# Patient Record
Sex: FEMALE | Race: WHITE | Employment: OTHER | URBAN - METROPOLITAN AREA
[De-identification: names, ages, dates, MRNs, and addresses within clinical notes are randomized per-mention and may not be internally consistent; named-entity substitution may affect disease eponyms.]

---

## 2017-06-20 LAB
AVERAGE GLUCOSE: NORMAL
HBA1C MFR BLD: 5.7 %

## 2019-03-19 ENCOUNTER — APPOINTMENT (OUTPATIENT)
Dept: GENERAL RADIOLOGY | Age: 74
End: 2019-03-19
Payer: MEDICARE

## 2019-03-19 ENCOUNTER — HOSPITAL ENCOUNTER (EMERGENCY)
Age: 74
Discharge: HOME | End: 2019-03-19
Attending: EMERGENCY MEDICINE
Payer: MEDICARE

## 2019-03-19 VITALS
DIASTOLIC BLOOD PRESSURE: 82 MMHG | HEART RATE: 79 BPM | RESPIRATION RATE: 18 BRPM | TEMPERATURE: 97.8 F | BODY MASS INDEX: 36.96 KG/M2 | HEIGHT: 66 IN | OXYGEN SATURATION: 96 % | WEIGHT: 230 LBS | SYSTOLIC BLOOD PRESSURE: 151 MMHG

## 2019-03-19 DIAGNOSIS — R10.13 DYSPEPSIA: Primary | ICD-10-CM

## 2019-03-19 DIAGNOSIS — K21.9 GASTROESOPHAGEAL REFLUX DISEASE, ESOPHAGITIS PRESENCE NOT SPECIFIED: ICD-10-CM

## 2019-03-19 LAB
ALBUMIN SERPL-MCNC: 4.1 G/DL (ref 3.5–5.2)
ALP BLD-CCNC: 104 U/L (ref 35–104)
ALT SERPL-CCNC: 12 U/L (ref 0–32)
ANION GAP SERPL CALCULATED.3IONS-SCNC: 11 MMOL/L (ref 7–16)
AST SERPL-CCNC: 17 U/L (ref 0–31)
BASOPHILS ABSOLUTE: 0.04 E9/L (ref 0–0.2)
BASOPHILS RELATIVE PERCENT: 0.5 % (ref 0–2)
BILIRUB SERPL-MCNC: 0.3 MG/DL (ref 0–1.2)
BUN BLDV-MCNC: 23 MG/DL (ref 8–23)
CALCIUM SERPL-MCNC: 9 MG/DL (ref 8.6–10.2)
CHLORIDE BLD-SCNC: 107 MMOL/L (ref 98–107)
CO2: 22 MMOL/L (ref 22–29)
CREAT SERPL-MCNC: 1 MG/DL (ref 0.5–1)
EOSINOPHILS ABSOLUTE: 0.04 E9/L (ref 0.05–0.5)
EOSINOPHILS RELATIVE PERCENT: 0.5 % (ref 0–6)
GFR AFRICAN AMERICAN: >60
GFR NON-AFRICAN AMERICAN: 54 ML/MIN/1.73
GLUCOSE BLD-MCNC: 97 MG/DL (ref 74–99)
HCT VFR BLD CALC: 42.3 % (ref 34–48)
HEMOGLOBIN: 14.1 G/DL (ref 11.5–15.5)
IMMATURE GRANULOCYTES #: 0.02 E9/L
IMMATURE GRANULOCYTES %: 0.3 % (ref 0–5)
LYMPHOCYTES ABSOLUTE: 1.53 E9/L (ref 1.5–4)
LYMPHOCYTES RELATIVE PERCENT: 20.3 % (ref 20–42)
MCH RBC QN AUTO: 32.5 PG (ref 26–35)
MCHC RBC AUTO-ENTMCNC: 33.3 % (ref 32–34.5)
MCV RBC AUTO: 97.5 FL (ref 80–99.9)
MONOCYTES ABSOLUTE: 0.71 E9/L (ref 0.1–0.95)
MONOCYTES RELATIVE PERCENT: 9.4 % (ref 2–12)
NEUTROPHILS ABSOLUTE: 5.21 E9/L (ref 1.8–7.3)
NEUTROPHILS RELATIVE PERCENT: 69 % (ref 43–80)
PDW BLD-RTO: 12.7 FL (ref 11.5–15)
PLATELET # BLD: 273 E9/L (ref 130–450)
PMV BLD AUTO: 11.3 FL (ref 7–12)
POTASSIUM SERPL-SCNC: 4.3 MMOL/L (ref 3.5–5)
PRO-BNP: 1235 PG/ML (ref 0–450)
RBC # BLD: 4.34 E12/L (ref 3.5–5.5)
SODIUM BLD-SCNC: 140 MMOL/L (ref 132–146)
TOTAL PROTEIN: 7.2 G/DL (ref 6.4–8.3)
TROPONIN: <0.01 NG/ML (ref 0–0.03)
WBC # BLD: 7.6 E9/L (ref 4.5–11.5)

## 2019-03-19 PROCEDURE — 84484 ASSAY OF TROPONIN QUANT: CPT

## 2019-03-19 PROCEDURE — 6370000000 HC RX 637 (ALT 250 FOR IP): Performed by: STUDENT IN AN ORGANIZED HEALTH CARE EDUCATION/TRAINING PROGRAM

## 2019-03-19 PROCEDURE — 80053 COMPREHEN METABOLIC PANEL: CPT

## 2019-03-19 PROCEDURE — 71045 X-RAY EXAM CHEST 1 VIEW: CPT

## 2019-03-19 PROCEDURE — 99285 EMERGENCY DEPT VISIT HI MDM: CPT

## 2019-03-19 PROCEDURE — 85025 COMPLETE CBC W/AUTO DIFF WBC: CPT

## 2019-03-19 PROCEDURE — 83880 ASSAY OF NATRIURETIC PEPTIDE: CPT

## 2019-03-19 RX ORDER — PANTOPRAZOLE SODIUM 40 MG/1
40 TABLET, DELAYED RELEASE ORAL
Qty: 30 TABLET | Refills: 3 | Status: SHIPPED | OUTPATIENT
Start: 2019-03-19 | End: 2019-09-12

## 2019-03-19 RX ORDER — SUCRALFATE ORAL 1 G/10ML
1 SUSPENSION ORAL 4 TIMES DAILY
Qty: 1200 ML | Refills: 0 | Status: SHIPPED | OUTPATIENT
Start: 2019-03-19 | End: 2019-09-12

## 2019-03-19 RX ORDER — METOPROLOL SUCCINATE 25 MG/1
25 TABLET, EXTENDED RELEASE ORAL 2 TIMES DAILY
COMMUNITY
End: 2019-06-24 | Stop reason: SDUPTHER

## 2019-03-19 RX ORDER — ASPIRIN 81 MG/1
324 TABLET, CHEWABLE ORAL ONCE
Status: COMPLETED | OUTPATIENT
Start: 2019-03-19 | End: 2019-03-19

## 2019-03-19 RX ADMIN — ASPIRIN 81 MG 324 MG: 81 TABLET ORAL at 15:47

## 2019-03-19 RX ADMIN — LIDOCAINE HYDROCHLORIDE: 20 SOLUTION ORAL; TOPICAL at 15:47

## 2019-03-19 ASSESSMENT — PAIN DESCRIPTION - DESCRIPTORS: DESCRIPTORS: BURNING

## 2019-03-19 ASSESSMENT — ENCOUNTER SYMPTOMS
CHEST TIGHTNESS: 0
BLOOD IN STOOL: 0
COUGH: 0
RHINORRHEA: 0
TROUBLE SWALLOWING: 0
WHEEZING: 0
ABDOMINAL DISTENTION: 0
SINUS PRESSURE: 0
SHORTNESS OF BREATH: 0
EYE PAIN: 0
NAUSEA: 0
SORE THROAT: 0
BACK PAIN: 0
CONSTIPATION: 0
DIARRHEA: 0
EYE REDNESS: 0
PHOTOPHOBIA: 0
ABDOMINAL PAIN: 1
VOMITING: 0

## 2019-03-19 ASSESSMENT — PAIN DESCRIPTION - LOCATION: LOCATION: CHEST

## 2019-03-19 ASSESSMENT — PAIN DESCRIPTION - PAIN TYPE: TYPE: ACUTE PAIN

## 2019-03-19 ASSESSMENT — PAIN DESCRIPTION - ORIENTATION: ORIENTATION: MID

## 2019-03-19 ASSESSMENT — PAIN SCALES - GENERAL: PAINLEVEL_OUTOF10: 4

## 2019-03-29 LAB
EKG ATRIAL RATE: 416 BPM
EKG Q-T INTERVAL: 426 MS
EKG QRS DURATION: 84 MS
EKG QTC CALCULATION (BAZETT): 396 MS
EKG R AXIS: 25 DEGREES
EKG T AXIS: 15 DEGREES
EKG VENTRICULAR RATE: 52 BPM

## 2019-06-24 DIAGNOSIS — M25.569 ARTHRALGIA OF KNEE, UNSPECIFIED LATERALITY: Primary | ICD-10-CM

## 2019-06-24 RX ORDER — ACETAMINOPHEN AND CODEINE PHOSPHATE 300; 30 MG/1; MG/1
TABLET ORAL
COMMUNITY
Start: 2019-05-31 | End: 2019-06-24 | Stop reason: SDUPTHER

## 2019-06-25 RX ORDER — DILTIAZEM HYDROCHLORIDE 360 MG/1
360 CAPSULE, EXTENDED RELEASE ORAL DAILY
Qty: 90 CAPSULE | Refills: 1 | Status: SHIPPED | OUTPATIENT
Start: 2019-06-25 | End: 2019-11-04 | Stop reason: SDUPTHER

## 2019-06-25 RX ORDER — METOPROLOL SUCCINATE 25 MG/1
25 TABLET, EXTENDED RELEASE ORAL 2 TIMES DAILY
Qty: 90 TABLET | Refills: 1 | Status: ON HOLD | OUTPATIENT
Start: 2019-06-25 | End: 2020-01-15

## 2019-06-25 RX ORDER — ACETAMINOPHEN AND CODEINE PHOSPHATE 300; 30 MG/1; MG/1
1 TABLET ORAL EVERY 6 HOURS PRN
Qty: 120 TABLET | Refills: 3 | Status: SHIPPED
Start: 2019-06-25 | End: 2019-07-25

## 2019-09-12 ENCOUNTER — OFFICE VISIT (OUTPATIENT)
Dept: PRIMARY CARE CLINIC | Age: 74
End: 2019-09-12
Payer: MEDICARE

## 2019-09-12 VITALS
SYSTOLIC BLOOD PRESSURE: 142 MMHG | BODY MASS INDEX: 36.48 KG/M2 | HEART RATE: 58 BPM | TEMPERATURE: 98.3 F | DIASTOLIC BLOOD PRESSURE: 72 MMHG | HEIGHT: 66 IN | WEIGHT: 227 LBS | OXYGEN SATURATION: 97 %

## 2019-09-12 DIAGNOSIS — R73.01 IMPAIRED FASTING BLOOD SUGAR: ICD-10-CM

## 2019-09-12 DIAGNOSIS — I48.0 PAROXYSMAL ATRIAL FIBRILLATION (HCC): Primary | ICD-10-CM

## 2019-09-12 DIAGNOSIS — Z13.6 SCREENING FOR CARDIOVASCULAR CONDITION: ICD-10-CM

## 2019-09-12 PROCEDURE — 99213 OFFICE O/P EST LOW 20 MIN: CPT | Performed by: FAMILY MEDICINE

## 2019-09-12 ASSESSMENT — PATIENT HEALTH QUESTIONNAIRE - PHQ9
SUM OF ALL RESPONSES TO PHQ9 QUESTIONS 1 & 2: 0
SUM OF ALL RESPONSES TO PHQ QUESTIONS 1-9: 0
SUM OF ALL RESPONSES TO PHQ QUESTIONS 1-9: 0
2. FEELING DOWN, DEPRESSED OR HOPELESS: 0
1. LITTLE INTEREST OR PLEASURE IN DOING THINGS: 0

## 2019-09-12 ASSESSMENT — ENCOUNTER SYMPTOMS
COUGH: 0
DIARRHEA: 0
NAUSEA: 0
CONSTIPATION: 0
BACK PAIN: 0
VOMITING: 0
WHEEZING: 0
SHORTNESS OF BREATH: 0
ABDOMINAL PAIN: 0

## 2019-09-12 NOTE — PROGRESS NOTES
19  Paul Crowe : 1945 Sex: female  Age: 74 y.o. Chief Complaint   Patient presents with   Beatrice Nunez       HPI:  74 y.o. female presents today for 5 month(s) follow up of Chronic medical conditions and to establish with new provider. Patient's chart, medical, surgical and medication history all reviewed. Atrial Fibrillation  Patient has atrial fibrillation. Previously seen by Dr. Oumou Chan. Rate controlled on Metoprolol and Diltiazem. Current rhythm: irregular. Known history of atrial fibrillation: yes  Valvular disease: No  Associated symptoms: none  Previous cardioversion and/or ablation: no  History of CAD: No  History of sleep apnea: No  History of ETOH abuse/drug abuse: No  History of thyroid disease: No    No palpitations, dizziness, shortness of breath, chest pain, racing heart, bruising or bleeding. ROS:  Review of Systems   Constitutional: Negative for chills, fatigue and fever. Respiratory: Negative for cough, shortness of breath and wheezing. Cardiovascular: Negative for chest pain and palpitations. Gastrointestinal: Negative for abdominal pain, constipation, diarrhea, nausea and vomiting. Musculoskeletal: Negative for arthralgias and back pain. Skin: Negative for rash. Neurological: Negative for dizziness and headaches. Psychiatric/Behavioral: Negative for dysphoric mood. The patient is not nervous/anxious.          Current Outpatient Medications:     Acetaminophen-Codeine (TYLENOL WITH CODEINE #3 PO), Take by mouth as needed, Disp: , Rfl:     apixaban (ELIQUIS) 5 MG TABS tablet, Take 1 tablet by mouth 2 times daily, Disp: 180 tablet, Rfl: 3    metoprolol succinate (TOPROL XL) 25 MG extended release tablet, Take 1 tablet by mouth 2 times daily, Disp: 90 tablet, Rfl: 1    diltiazem (CARDIZEM CD) 360 MG extended release capsule, Take 1 capsule by mouth daily, Disp: 90 capsule, Rfl: 1    Allergies   Allergen Reactions    Penicillins Hives Past Medical History:   Diagnosis Date    History of DVT (deep vein thrombosis)     Hyperlipidemia     Hypertension     Obesity      Past Surgical History:   Procedure Laterality Date    APPENDECTOMY      CHOLECYSTECTOMY      COLONOSCOPY  10/07/2016    JOINT REPLACEMENT Right 2012    TKA     Family History   Problem Relation Age of Onset    Pancreatic Cancer Brother     Diabetes type 2  Brother     Diabetes type 2  Mother     Other Father         AAA    Diabetes type 2  Sister     Pancreatic Cancer Brother      Social History     Socioeconomic History    Marital status: Single     Spouse name: Not on file    Number of children: Not on file    Years of education: Not on file    Highest education level: Not on file   Occupational History    Not on file   Social Needs    Financial resource strain: Not on file    Food insecurity:     Worry: Not on file     Inability: Not on file    Transportation needs:     Medical: Not on file     Non-medical: Not on file   Tobacco Use    Smoking status: Never Smoker    Smokeless tobacco: Never Used   Substance and Sexual Activity    Alcohol use: No    Drug use: No    Sexual activity: Not on file   Lifestyle    Physical activity:     Days per week: Not on file     Minutes per session: Not on file    Stress: Not on file   Relationships    Social connections:     Talks on phone: Not on file     Gets together: Not on file     Attends Congregation service: Not on file     Active member of club or organization: Not on file     Attends meetings of clubs or organizations: Not on file     Relationship status: Not on file    Intimate partner violence:     Fear of current or ex partner: Not on file     Emotionally abused: Not on file     Physically abused: Not on file     Forced sexual activity: Not on file   Other Topics Concern    Not on file   Social History Narrative    Not on file       Vitals:    09/12/19 1043   BP: (!) 142/72   Pulse: 58   Temp: 98.3 °F

## 2019-11-04 DIAGNOSIS — I48.0 PAROXYSMAL ATRIAL FIBRILLATION (HCC): ICD-10-CM

## 2019-11-04 RX ORDER — DILTIAZEM HYDROCHLORIDE 360 MG/1
360 CAPSULE, EXTENDED RELEASE ORAL DAILY
Qty: 90 CAPSULE | Refills: 3 | Status: SHIPPED
Start: 2019-11-04 | End: 2020-02-24

## 2019-11-04 RX ORDER — METOPROLOL SUCCINATE 25 MG/1
25 TABLET, EXTENDED RELEASE ORAL 2 TIMES DAILY
Qty: 30 TABLET | Refills: 5 | Status: CANCELLED | OUTPATIENT
Start: 2019-11-04

## 2020-01-15 ENCOUNTER — APPOINTMENT (OUTPATIENT)
Dept: GENERAL RADIOLOGY | Age: 75
End: 2020-01-15
Payer: MEDICARE

## 2020-01-15 ENCOUNTER — OFFICE VISIT (OUTPATIENT)
Dept: FAMILY MEDICINE CLINIC | Age: 75
End: 2020-01-15
Payer: MEDICARE

## 2020-01-15 ENCOUNTER — HOSPITAL ENCOUNTER (OUTPATIENT)
Age: 75
Setting detail: OBSERVATION
Discharge: HOME | End: 2020-01-16
Attending: EMERGENCY MEDICINE | Admitting: INTERNAL MEDICINE
Payer: MEDICARE

## 2020-01-15 VITALS
TEMPERATURE: 99.2 F | RESPIRATION RATE: 16 BRPM | HEIGHT: 65 IN | SYSTOLIC BLOOD PRESSURE: 144 MMHG | DIASTOLIC BLOOD PRESSURE: 80 MMHG | BODY MASS INDEX: 37.99 KG/M2 | HEART RATE: 68 BPM | OXYGEN SATURATION: 97 % | WEIGHT: 228 LBS

## 2020-01-15 PROBLEM — R07.89 OTHER CHEST PAIN: Status: ACTIVE | Noted: 2020-01-15

## 2020-01-15 LAB
ALBUMIN SERPL-MCNC: 4.1 G/DL (ref 3.5–5.2)
ALP BLD-CCNC: 112 U/L (ref 35–104)
ALT SERPL-CCNC: 11 U/L (ref 0–32)
ANION GAP SERPL CALCULATED.3IONS-SCNC: 12 MMOL/L (ref 7–16)
AST SERPL-CCNC: 14 U/L (ref 0–31)
BASOPHILS ABSOLUTE: 0.05 E9/L (ref 0–0.2)
BASOPHILS RELATIVE PERCENT: 0.7 % (ref 0–2)
BILIRUB SERPL-MCNC: 0.2 MG/DL (ref 0–1.2)
BUN BLDV-MCNC: 23 MG/DL (ref 8–23)
CALCIUM SERPL-MCNC: 9.8 MG/DL (ref 8.6–10.2)
CHLORIDE BLD-SCNC: 103 MMOL/L (ref 98–107)
CO2: 25 MMOL/L (ref 22–29)
CREAT SERPL-MCNC: 1.2 MG/DL (ref 0.5–1)
EKG ATRIAL RATE: 104 BPM
EKG Q-T INTERVAL: 396 MS
EKG QRS DURATION: 86 MS
EKG QTC CALCULATION (BAZETT): 408 MS
EKG R AXIS: 41 DEGREES
EKG T AXIS: 19 DEGREES
EKG VENTRICULAR RATE: 64 BPM
EOSINOPHILS ABSOLUTE: 0.02 E9/L (ref 0.05–0.5)
EOSINOPHILS RELATIVE PERCENT: 0.3 % (ref 0–6)
GFR AFRICAN AMERICAN: 53
GFR NON-AFRICAN AMERICAN: 44 ML/MIN/1.73
GLUCOSE BLD-MCNC: 120 MG/DL (ref 74–99)
HCT VFR BLD CALC: 43.3 % (ref 34–48)
HEMOGLOBIN: 14 G/DL (ref 11.5–15.5)
IMMATURE GRANULOCYTES #: 0.02 E9/L
IMMATURE GRANULOCYTES %: 0.3 % (ref 0–5)
LYMPHOCYTES ABSOLUTE: 1.6 E9/L (ref 1.5–4)
LYMPHOCYTES RELATIVE PERCENT: 23 % (ref 20–42)
MCH RBC QN AUTO: 31.8 PG (ref 26–35)
MCHC RBC AUTO-ENTMCNC: 32.3 % (ref 32–34.5)
MCV RBC AUTO: 98.4 FL (ref 80–99.9)
MONOCYTES ABSOLUTE: 0.6 E9/L (ref 0.1–0.95)
MONOCYTES RELATIVE PERCENT: 8.6 % (ref 2–12)
NEUTROPHILS ABSOLUTE: 4.67 E9/L (ref 1.8–7.3)
NEUTROPHILS RELATIVE PERCENT: 67.1 % (ref 43–80)
PDW BLD-RTO: 12.6 FL (ref 11.5–15)
PLATELET # BLD: 268 E9/L (ref 130–450)
PMV BLD AUTO: 10.9 FL (ref 7–12)
POTASSIUM REFLEX MAGNESIUM: 5 MMOL/L (ref 3.5–5)
RBC # BLD: 4.4 E12/L (ref 3.5–5.5)
SODIUM BLD-SCNC: 140 MMOL/L (ref 132–146)
TOTAL PROTEIN: 7.3 G/DL (ref 6.4–8.3)
TROPONIN: <0.01 NG/ML (ref 0–0.03)
TROPONIN: <0.01 NG/ML (ref 0–0.03)
WBC # BLD: 7 E9/L (ref 4.5–11.5)

## 2020-01-15 PROCEDURE — 93005 ELECTROCARDIOGRAM TRACING: CPT | Performed by: NURSE PRACTITIONER

## 2020-01-15 PROCEDURE — 71045 X-RAY EXAM CHEST 1 VIEW: CPT

## 2020-01-15 PROCEDURE — 99213 OFFICE O/P EST LOW 20 MIN: CPT | Performed by: FAMILY MEDICINE

## 2020-01-15 PROCEDURE — G0378 HOSPITAL OBSERVATION PER HR: HCPCS

## 2020-01-15 PROCEDURE — 84484 ASSAY OF TROPONIN QUANT: CPT

## 2020-01-15 PROCEDURE — 85025 COMPLETE CBC W/AUTO DIFF WBC: CPT

## 2020-01-15 PROCEDURE — 6370000000 HC RX 637 (ALT 250 FOR IP): Performed by: INTERNAL MEDICINE

## 2020-01-15 PROCEDURE — 80053 COMPREHEN METABOLIC PANEL: CPT

## 2020-01-15 PROCEDURE — 99284 EMERGENCY DEPT VISIT MOD MDM: CPT

## 2020-01-15 PROCEDURE — 36415 COLL VENOUS BLD VENIPUNCTURE: CPT

## 2020-01-15 RX ORDER — CALCIUM CARBONATE 200(500)MG
2 TABLET,CHEWABLE ORAL 2 TIMES DAILY PRN
COMMUNITY

## 2020-01-15 RX ORDER — DILTIAZEM HYDROCHLORIDE 180 MG/1
360 CAPSULE, COATED, EXTENDED RELEASE ORAL DAILY
Status: DISCONTINUED | OUTPATIENT
Start: 2020-01-16 | End: 2020-01-16 | Stop reason: HOSPADM

## 2020-01-15 RX ORDER — ATORVASTATIN CALCIUM 40 MG/1
40 TABLET, FILM COATED ORAL NIGHTLY
Status: DISCONTINUED | OUTPATIENT
Start: 2020-01-15 | End: 2020-01-16 | Stop reason: HOSPADM

## 2020-01-15 RX ORDER — ONDANSETRON 2 MG/ML
4 INJECTION INTRAMUSCULAR; INTRAVENOUS EVERY 6 HOURS PRN
Status: DISCONTINUED | OUTPATIENT
Start: 2020-01-15 | End: 2020-01-16 | Stop reason: HOSPADM

## 2020-01-15 RX ORDER — DILTIAZEM HYDROCHLORIDE 360 MG/1
CAPSULE, EXTENDED RELEASE ORAL
Status: ON HOLD | COMMUNITY
Start: 2019-12-28 | End: 2020-01-15

## 2020-01-15 RX ADMIN — METOPROLOL TARTRATE 25 MG: 25 TABLET ORAL at 21:57

## 2020-01-15 RX ADMIN — APIXABAN 5 MG: 5 TABLET, FILM COATED ORAL at 21:57

## 2020-01-15 RX ADMIN — ATORVASTATIN CALCIUM 40 MG: 40 TABLET, FILM COATED ORAL at 21:57

## 2020-01-15 ASSESSMENT — PAIN - FUNCTIONAL ASSESSMENT: PAIN_FUNCTIONAL_ASSESSMENT: ACTIVITIES ARE NOT PREVENTED

## 2020-01-15 ASSESSMENT — PAIN DESCRIPTION - ORIENTATION
ORIENTATION: MID
ORIENTATION: RIGHT;LEFT

## 2020-01-15 ASSESSMENT — PAIN DESCRIPTION - ONSET
ONSET: GRADUAL
ONSET: ON-GOING

## 2020-01-15 ASSESSMENT — PAIN DESCRIPTION - FREQUENCY
FREQUENCY: CONTINUOUS
FREQUENCY: CONTINUOUS

## 2020-01-15 ASSESSMENT — PAIN DESCRIPTION - LOCATION
LOCATION: KNEE
LOCATION: CHEST

## 2020-01-15 ASSESSMENT — PAIN DESCRIPTION - PAIN TYPE
TYPE: ACUTE PAIN
TYPE: CHRONIC PAIN

## 2020-01-15 ASSESSMENT — PAIN SCALES - GENERAL
PAINLEVEL_OUTOF10: 5
PAINLEVEL_OUTOF10: 5

## 2020-01-15 ASSESSMENT — PAIN DESCRIPTION - PROGRESSION
CLINICAL_PROGRESSION: NOT CHANGED
CLINICAL_PROGRESSION: GRADUALLY WORSENING

## 2020-01-15 ASSESSMENT — PAIN DESCRIPTION - DESCRIPTORS
DESCRIPTORS: BURNING
DESCRIPTORS: DISCOMFORT;SHARP

## 2020-01-15 NOTE — PROGRESS NOTES
1/15/20  Av Vzaquez : 1945 Sex: female  Age: 66 y.o. Chief Complaint   Patient presents with    Heartburn       HPI: The patient states that they have had epigastric/central chest discomfort. Started about 4 am this morning. The pain is described as heaviness/chest discomfort and is located in the epigastric and central chest region. The patient denies nausea and vomiting. Better with food. But comes back. Tums did help but it did come back shortly after taking. Bowel movements have been normal color and consistency. No diarrhea. No black or bloody stools. The patient denies dysuria, urinary frequency, urgency and or gross hematuria. No flank pain. No fevers or chills. Some SOB. They come to the urgent care for evaluation. ROS:  Const: Positives and pertinent negatives as per HPI. All others reviewed and are negative.          Current Outpatient Medications:     diltiazem (TIAZAC) 360 MG extended release capsule, , Disp: , Rfl:     diltiazem (CARDIZEM CD) 360 MG extended release capsule, Take 1 capsule by mouth daily, Disp: 90 capsule, Rfl: 3    apixaban (ELIQUIS) 5 MG TABS tablet, Take 1 tablet by mouth 2 times daily, Disp: 180 tablet, Rfl: 3    metoprolol tartrate (LOPRESSOR) 25 MG tablet, Take 1 tablet by mouth 2 times daily, Disp: 180 tablet, Rfl: 3    Acetaminophen-Codeine (TYLENOL WITH CODEINE #3 PO), Take by mouth as needed, Disp: , Rfl:     metoprolol succinate (TOPROL XL) 25 MG extended release tablet, Take 1 tablet by mouth 2 times daily, Disp: 90 tablet, Rfl: 1  Allergies   Allergen Reactions    Aspirin     Penicillins Hives       Past Medical History:   Diagnosis Date    A-fib ([facility] [address]     paroxysmal    Acute renal failure syndrome (HCC) 10/1/16  10/07/2016    2ry severe dehydration     Chronic pain syndrome     Colon polyps     Microscopic colitis    Degenerative joint disease     Involving multiple joints    Diarrhea     2ry C-Diff 10/1/2016    GERD (gastroesophageal reflux disease)     Hemorrhoids     History of DVT (deep vein thrombosis)     Hx of acute gastritis     Hx of diarrhea     Hyperlipidemia     Hypertension     Hypo-osmolality and hyponatremia     Hypotension     Insomnia     Obesity     Paroxysmal atrial fibrillation (HCC)     Peptic reflux disease     Pulmonary embolism (HCC)     Syncope and collapse      Past Surgical History:   Procedure Laterality Date    APPENDECTOMY      CHOLECYSTECTOMY      COLONOSCOPY  10/07/2016    JOINT REPLACEMENT Right 2012    TKA     Family History   Problem Relation Age of Onset    Pancreatic Cancer Brother     Diabetes type 2  Brother     Diabetes type 2  Mother     Other Father         AAA    Diabetes type 2  Sister     Pancreatic Cancer Brother      Social History     Socioeconomic History    Marital status: Single     Spouse name: Not on file    Number of children: Not on file    Years of education: Not on file    Highest education level: Not on file   Occupational History    Not on file   Social Needs    Financial resource strain: Not on file    Food insecurity:     Worry: Not on file     Inability: Not on file    Transportation needs:     Medical: Not on file     Non-medical: Not on file   Tobacco Use    Smoking status: Never Smoker    Smokeless tobacco: Never Used   Substance and Sexual Activity    Alcohol use: No    Drug use: No    Sexual activity: Not on file   Lifestyle    Physical activity:     Days per week: Not on file     Minutes per session: Not on file    Stress: Not on file   Relationships    Social connections:     Talks on phone: Not on file     Gets together: Not on file     Attends Sikhism service: Not on file     Active member of club or organization: Not on file     Attends meetings of clubs or organizations: Not on file     Relationship status: Not on file    Intimate partner violence:     Fear of current or ex partner: Not on file     Emotionally abused: Not on file     Physically abused: Not on file     Forced sexual activity: Not on file   Other Topics Concern    Not on file   Social History Narrative    Not on file       Vitals:    01/15/20 1418 01/15/20 1422   BP: (!) 144/80 (!) 144/80   Pulse: 68    Resp: 16    Temp: 99.2 °F (37.3 °C)    SpO2: 97%    Weight: 228 lb (103.4 kg)    Height: 5' 5\" (1.651 m)        Exam:  Physical Exam  Constitutional:       Appearance: She is well-developed. HENT:      Head: Normocephalic. Eyes:      Conjunctiva/sclera: Conjunctivae normal.      Pupils: Pupils are equal, round, and reactive to light. Cardiovascular:      Rate and Rhythm: Normal rate. Rhythm irregularly irregular. Heart sounds: Normal heart sounds. No murmur. Pulmonary:      Effort: Pulmonary effort is normal.      Breath sounds: Normal breath sounds. No wheezing or rales. Abdominal:      General: Bowel sounds are normal.      Palpations: Abdomen is soft. Tenderness: There is no tenderness. Neurological:      Mental Status: She is alert. Comments: Cranial nerves grossly intact         Assessment and Plan:  Marcelo Knott was seen today for heartburn. Diagnoses and all orders for this visit:    Other chest pain    To ER. Patient need EKG, Trops, and chest x-ray. Consider outpatient stress or coronary CT for calcium scoring at [facility] if above work-up is negative. Return in about 1 week (around 1/22/2020), or if symptoms worsen or fail to improve. The above treatment regimen was discussed with the patient at length and all questions in regards to such were answered. Patient was advised to proceed to the emergency department with any worsening symptoms including significant dehydration. Patient should follow up with her family doctor within 3-5 days.     Seen By:  Sunni Duvall MD

## 2020-01-15 NOTE — ED PROVIDER NOTES
ED Attending  CC: Sarah         Department of Emergency Medicine   ED  Provider Note  Admit Date/RoomTime: 1/15/2020  4:15 PM  ED Room: 0627/0627-A  MRN: [id number]  Chief Complaint:   Gastroesophageal Reflux (onset 0400 today )       History of Present Illness   Source of history provided by:  patient. History/Exam Limitations: none. Tre Butler is a 66 y.o. female who has a past medical history of:   Past Medical History:   Diagnosis Date    Acute renal failure syndrome ([address] 10/1/16  10/07/2016    2ry severe dehydration     Chronic pain syndrome     Colon polyps     Microscopic colitis    Degenerative joint disease     Involving multiple joints    Diarrhea     2ry C-Diff 10/1/2016    GERD (gastroesophageal reflux disease)     Hemorrhoids     History of DVT (deep vein thrombosis)     History of pulmonary embolism     following knee surgery    Hx of acute gastritis     Hx of diarrhea     Hyperlipidemia     Hypertension     Hypo-osmolality and hyponatremia     Insomnia     denies as of 1/15/20    Obesity     Paroxysmal atrial fibrillation (HCC)     Syncope and collapse     presents to the ED by private vehicle for centralized chest pain beginning a few hours prior to arrival./Trouble lies chest pain starting a few hours before arrival.  Patient states her symptoms have completely resolved. Patient states she had a similar episode last year which was treated as GERD. Patient states she was sitting when it started. Patient states she tried Tums and drinking milk with no relief. Patient has a  H/o a fib, PE. Denies fever/chills, headache, dizziness, vision change, cough, hemoptysis, dyspnea, abdominal pain, NVD, urinary symptoms, numbness/weakness. ROS   Pertinent positives and negatives are stated within HPI, all other systems reviewed and are negative.        Past Surgical History:   Procedure Laterality Date    APPENDECTOMY      CHOLECYSTECTOMY      COLONOSCOPY  10/07/2016    JOINT REPLACEMENT Right 2012    TKA   Social History:  reports that she has never smoked. She has never used smokeless tobacco. She reports that she does not drink alcohol or use drugs. Family History: family history includes Diabetes type 2  in her brother, mother, and sister; Other in her father; Pancreatic Cancer in her brother and brother. Allergies: Aspirin and Penicillins    Physical Exam Section   Oxygen Saturation Interpretation: Normal.   ED Triage Vitals   BP Temp Temp src Pulse Resp SpO2 Height Weight   01/15/20 1506 01/15/20 1506 -- 01/15/20 1506 01/15/20 1506 01/15/20 1506 01/15/20 1441 01/15/20 1441   138/73 98.6 °F (37 °C)  68 16 94 % 5' 6\" (1.676 m) 248 lb (112.5 kg)       Physical Exam  · Constitutional/General: Alert and oriented x3, well appearing, non toxic. · HEENT:  NC/NT. PERRLA,  Airway patent. · Neck: Supple, full ROM, non tender to palpation in the midline, no stridor, no crepitus, no meningeal signs  · Respiratory: Lungs clear to auscultation bilaterally, no wheezes, rales, or rhonchi. Not in respiratory distress  · CV:  Regular rate. Regular rhythm. No murmurs, gallops, or rubs. 2+ distal pulses  · Chest: No chest wall tenderness  · GI:  Abdomen Soft, Non tender, Non distended. +BS. No rebound, guarding, or rigidity. No pulsatile masses. · Musculoskeletal: Moves all extremities x 4. Warm and well perfused, no clubbing, cyanosis, or edema. Capillary refill <3 seconds  · Integument: skin warm and dry. No rashes.    · Lymphatic: no lymphadenopathy noted  · Neurologic: GCS 15, no focal deficits, symmetric strength 5/5 in the upper and lower extremities bilaterally  · Psychiatric: Normal Affect      Lab / Imaging Results   (All laboratory and radiology results have been personally reviewed by myself)  Labs:  Results for orders placed or performed during the hospital encounter of 01/15/20   CBC Auto Differential   Result Value Ref Range    WBC 7.0 4.5 - 11.5 E9/L    RBC 4.40 3.50 - 5.50 E12/L Hemoglobin 14.0 11.5 - 15.5 g/dL    Hematocrit 43.3 34.0 - 48.0 %    MCV 98.4 80.0 - 99.9 fL    MCH 31.8 26.0 - 35.0 pg    MCHC 32.3 32.0 - 34.5 %    RDW 12.6 11.5 - 15.0 fL    Platelets 934 741 - 111 E9/L    MPV 10.9 7.0 - 12.0 fL    Neutrophils % 67.1 43.0 - 80.0 %    Immature Granulocytes % 0.3 0.0 - 5.0 %    Lymphocytes % 23.0 20.0 - 42.0 %    Monocytes % 8.6 2.0 - 12.0 %    Eosinophils % 0.3 0.0 - 6.0 %    Basophils % 0.7 0.0 - 2.0 %    Neutrophils Absolute 4.67 1.80 - 7.30 E9/L    Immature Granulocytes # 0.02 E9/L    Lymphocytes Absolute 1.60 1.50 - 4.00 E9/L    Monocytes Absolute 0.60 0.10 - 0.95 E9/L    Eosinophils Absolute 0.02 (L) 0.05 - 0.50 E9/L    Basophils Absolute 0.05 0.00 - 0.20 E9/L   Comprehensive Metabolic Panel w/ Reflex to MG   Result Value Ref Range    Sodium 140 132 - 146 mmol/L    Potassium reflex Magnesium 5.0 3.5 - 5.0 mmol/L    Chloride 103 98 - 107 mmol/L    CO2 25 22 - 29 mmol/L    Anion Gap 12 7 - 16 mmol/L    Glucose 120 (H) 74 - 99 mg/dL    BUN 23 8 - 23 mg/dL    CREATININE 1.2 (H) 0.5 - 1.0 mg/dL    GFR Non-African American 44 >=60 mL/min/1.73    GFR African American 53     Calcium 9.8 8.6 - 10.2 mg/dL    Total Protein 7.3 6.4 - 8.3 g/dL    Alb 4.1 3.5 - 5.2 g/dL    Total Bilirubin 0.2 0.0 - 1.2 mg/dL    Alkaline Phosphatase 112 (H) 35 - 104 U/L    ALT 11 0 - 32 U/L    AST 14 0 - 31 U/L   Troponin   Result Value Ref Range    Troponin <0.01 0.00 - 0.03 ng/mL   Comprehensive Metabolic Panel w/ Reflex to MG   Result Value Ref Range    Sodium 138 132 - 146 mmol/L    Potassium reflex Magnesium 4.0 3.5 - 5.0 mmol/L    Chloride 104 98 - 107 mmol/L    CO2 22 22 - 29 mmol/L    Anion Gap 12 7 - 16 mmol/L    Glucose 104 (H) 74 - 99 mg/dL    BUN 17 8 - 23 mg/dL    CREATININE 1.1 (H) 0.5 - 1.0 mg/dL    GFR Non-African American 48 >=60 mL/min/1.73    GFR African American 58     Calcium 9.1 8.6 - 10.2 mg/dL    Total Protein 6.9 6.4 - 8.3 g/dL    Alb 3.8 3.5 - 5.2 g/dL    Total Bilirubin 0.4 0.0 - 1.2 mg/dL    Alkaline Phosphatase 105 (H) 35 - 104 U/L    ALT 11 0 - 32 U/L    AST 13 0 - 31 U/L   Troponin   Result Value Ref Range    Troponin <0.01 0.00 - 0.03 ng/mL   Troponin   Result Value Ref Range    Troponin <0.01 0.00 - 0.03 ng/mL   Lipid panel - fasting   Result Value Ref Range    Cholesterol, Total 194 0 - 199 mg/dL    Triglycerides 122 0 - 149 mg/dL    HDL 40 >40 mg/dL    LDL Calculated 130 (H) 0 - 99 mg/dL    VLDL Cholesterol Calculated 24 mg/dL   CBC   Result Value Ref Range    WBC 6.5 4.5 - 11.5 E9/L    RBC 4.33 3.50 - 5.50 E12/L    Hemoglobin 14.0 11.5 - 15.5 g/dL    Hematocrit 42.6 34.0 - 48.0 %    MCV 98.4 80.0 - 99.9 fL    MCH 32.3 26.0 - 35.0 pg    MCHC 32.9 32.0 - 34.5 %    RDW 12.6 11.5 - 15.0 fL    Platelets 063 253 - 481 E9/L    MPV 11.1 7.0 - 12.0 fL   EKG 12 Lead   Result Value Ref Range    Ventricular Rate 64 BPM    Atrial Rate 104 BPM    QRS Duration 86 ms    Q-T Interval 396 ms    QTc Calculation (Bazett) 408 ms    R Axis 41 degrees    T Axis 19 degrees     Imaging: All Radiology results interpreted by Radiologist unless otherwise noted. XR CHEST PORTABLE   Final Result      No evidence for acute cardiopulmonary process. NM Cardiac Stress Test Nuclear Imaging    (Results Pending)     EKG #1:  Interpreted by emergency department physician unless otherwise noted.   Time:  1520    Rate: 64bpm; QRS 86  Rhythm: Atrial fibrillation  Interpretation: atrial fibrillation; no st segment elevation or depression      ED Course / Medical Decision Making     Medications   apixaban (ELIQUIS) tablet 5 mg (5 mg Oral Given 1/16/20 0850)   diltiazem (CARDIZEM CD) extended release capsule 360 mg (360 mg Oral Given 1/16/20 0850)   metoprolol tartrate (LOPRESSOR) tablet 25 mg (25 mg Oral Given 1/16/20 0850)   magnesium hydroxide (MILK OF MAGNESIA) 400 MG/5ML suspension 30 mL (has no administration in time range)   ondansetron (ZOFRAN) injection 4 mg (has no administration in time range) atorvastatin (LIPITOR) tablet 40 mg (40 mg Oral Given 1/15/20 7451)   regadenoson (LEXISCAN) injection 0.4 mg (has no administration in time range)   pantoprazole (PROTONIX) tablet 40 mg (has no administration in time range)          Consultations:             IP CONSULT TO INTERNAL MEDICINE  IP CONSULT TO CARDIOLOGY    Procedures:   none    MDM: Patient presenting with centralized chest pain starting at 0400 and resolving before arrival.  Patient states she attributed it to GERD but states it lasted longer than normal.  Patient states she had a similar episode last year around this time which was treated as GERD. Patient is in no acute distress and nontoxic in appearance. Patient has an aspirin allergy so it was not given. Patient has a heart score of 5. Patient's EKG shows that she is in a fib which she has a h/o. Patient's labs and imaging are stable patient to be admitted for cardiac work-up due to her history and symptoms. Counseling:   I have spoken with the patient and discussed todays results, in addition to providing specific details for the plan of care and counseling regarding the diagnosis and prognosis and are agreeable with the plan. Assessment      1. Chest pain, unspecified type      This patient's ED course included: a personal history and physicial examination, multiple bedside re-evaluations and cardiac monitoring  This patient has remained hemodynamically stable during their ED course. Plan   Admit to telemetry. Patient condition is stable. New Medications     Current Discharge Medication List      START taking these medications    Details   pantoprazole (PROTONIX) 40 MG tablet Take 1 tablet by mouth every morning (before breakfast)  Qty: 30 tablet, Refills: 0           Electronically signed by Darvin Mejia PA-C   DD: 1/15/20  **This report was transcribed using voice recognition software.  Every effort was made to ensure accuracy; however, inadvertent computerized transcription errors may be present.   END OF PROVIDER NOTE     Barry Underwood PA-C  01/16/20 1776

## 2020-01-16 ENCOUNTER — APPOINTMENT (OUTPATIENT)
Dept: NON INVASIVE DIAGNOSTICS | Age: 75
End: 2020-01-16
Payer: MEDICARE

## 2020-01-16 ENCOUNTER — APPOINTMENT (OUTPATIENT)
Dept: NUCLEAR MEDICINE | Age: 75
End: 2020-01-16
Payer: MEDICARE

## 2020-01-16 VITALS
RESPIRATION RATE: 20 BRPM | SYSTOLIC BLOOD PRESSURE: 126 MMHG | HEART RATE: 64 BPM | OXYGEN SATURATION: 98 % | WEIGHT: 248 LBS | HEIGHT: 66 IN | BODY MASS INDEX: 39.86 KG/M2 | DIASTOLIC BLOOD PRESSURE: 64 MMHG | TEMPERATURE: 96.6 F

## 2020-01-16 PROBLEM — R07.9 CHEST PAIN: Status: ACTIVE | Noted: 2020-01-15

## 2020-01-16 LAB
ALBUMIN SERPL-MCNC: 3.8 G/DL (ref 3.5–5.2)
ALP BLD-CCNC: 105 U/L (ref 35–104)
ALT SERPL-CCNC: 11 U/L (ref 0–32)
ANION GAP SERPL CALCULATED.3IONS-SCNC: 12 MMOL/L (ref 7–16)
AST SERPL-CCNC: 13 U/L (ref 0–31)
BILIRUB SERPL-MCNC: 0.4 MG/DL (ref 0–1.2)
BUN BLDV-MCNC: 17 MG/DL (ref 8–23)
CALCIUM SERPL-MCNC: 9.1 MG/DL (ref 8.6–10.2)
CHLORIDE BLD-SCNC: 104 MMOL/L (ref 98–107)
CHOLESTEROL, TOTAL: 194 MG/DL (ref 0–199)
CO2: 22 MMOL/L (ref 22–29)
CREAT SERPL-MCNC: 1.1 MG/DL (ref 0.5–1)
GFR AFRICAN AMERICAN: 58
GFR NON-AFRICAN AMERICAN: 48 ML/MIN/1.73
GLUCOSE BLD-MCNC: 104 MG/DL (ref 74–99)
HCT VFR BLD CALC: 42.6 % (ref 34–48)
HDLC SERPL-MCNC: 40 MG/DL
HEMOGLOBIN: 14 G/DL (ref 11.5–15.5)
LDL CHOLESTEROL CALCULATED: 130 MG/DL (ref 0–99)
LV EF: 73 %
LVEF MODALITY: NORMAL
MCH RBC QN AUTO: 32.3 PG (ref 26–35)
MCHC RBC AUTO-ENTMCNC: 32.9 % (ref 32–34.5)
MCV RBC AUTO: 98.4 FL (ref 80–99.9)
PDW BLD-RTO: 12.6 FL (ref 11.5–15)
PLATELET # BLD: 243 E9/L (ref 130–450)
PMV BLD AUTO: 11.1 FL (ref 7–12)
POTASSIUM REFLEX MAGNESIUM: 4 MMOL/L (ref 3.5–5)
RBC # BLD: 4.33 E12/L (ref 3.5–5.5)
SODIUM BLD-SCNC: 138 MMOL/L (ref 132–146)
TOTAL PROTEIN: 6.9 G/DL (ref 6.4–8.3)
TRIGL SERPL-MCNC: 122 MG/DL (ref 0–149)
TROPONIN: <0.01 NG/ML (ref 0–0.03)
VLDLC SERPL CALC-MCNC: 24 MG/DL
WBC # BLD: 6.5 E9/L (ref 4.5–11.5)

## 2020-01-16 PROCEDURE — 99204 OFFICE O/P NEW MOD 45 MIN: CPT | Performed by: INTERNAL MEDICINE

## 2020-01-16 PROCEDURE — 80061 LIPID PANEL: CPT

## 2020-01-16 PROCEDURE — 85027 COMPLETE CBC AUTOMATED: CPT

## 2020-01-16 PROCEDURE — 78452 HT MUSCLE IMAGE SPECT MULT: CPT

## 2020-01-16 PROCEDURE — 6360000002 HC RX W HCPCS: Performed by: NURSE PRACTITIONER

## 2020-01-16 PROCEDURE — 93017 CV STRESS TEST TRACING ONLY: CPT

## 2020-01-16 PROCEDURE — G0378 HOSPITAL OBSERVATION PER HR: HCPCS

## 2020-01-16 PROCEDURE — 3430000000 HC RX DIAGNOSTIC RADIOPHARMACEUTICAL: Performed by: RADIOLOGY

## 2020-01-16 PROCEDURE — 84484 ASSAY OF TROPONIN QUANT: CPT

## 2020-01-16 PROCEDURE — 93018 CV STRESS TEST I&R ONLY: CPT | Performed by: INTERNAL MEDICINE

## 2020-01-16 PROCEDURE — A9500 TC99M SESTAMIBI: HCPCS | Performed by: RADIOLOGY

## 2020-01-16 PROCEDURE — 6370000000 HC RX 637 (ALT 250 FOR IP): Performed by: INTERNAL MEDICINE

## 2020-01-16 PROCEDURE — 93016 CV STRESS TEST SUPVJ ONLY: CPT | Performed by: INTERNAL MEDICINE

## 2020-01-16 PROCEDURE — 36415 COLL VENOUS BLD VENIPUNCTURE: CPT

## 2020-01-16 PROCEDURE — APPSS60 APP SPLIT SHARED TIME 46-60 MINUTES: Performed by: NURSE PRACTITIONER

## 2020-01-16 PROCEDURE — 80053 COMPREHEN METABOLIC PANEL: CPT

## 2020-01-16 RX ORDER — PANTOPRAZOLE SODIUM 40 MG/1
40 TABLET, DELAYED RELEASE ORAL
Qty: 30 TABLET | Refills: 0 | Status: SHIPPED | OUTPATIENT
Start: 2020-01-17 | End: 2020-02-24

## 2020-01-16 RX ORDER — PANTOPRAZOLE SODIUM 40 MG/1
40 TABLET, DELAYED RELEASE ORAL
Status: DISCONTINUED | OUTPATIENT
Start: 2020-01-17 | End: 2020-01-16 | Stop reason: HOSPADM

## 2020-01-16 RX ADMIN — Medication 10 MILLICURIE: at 11:30

## 2020-01-16 RX ADMIN — APIXABAN 5 MG: 5 TABLET, FILM COATED ORAL at 08:50

## 2020-01-16 RX ADMIN — REGADENOSON 0.4 MG: 0.08 INJECTION, SOLUTION INTRAVENOUS at 13:15

## 2020-01-16 RX ADMIN — Medication 30 MILLICURIE: at 13:00

## 2020-01-16 RX ADMIN — DILTIAZEM HYDROCHLORIDE 360 MG: 180 CAPSULE, COATED, EXTENDED RELEASE ORAL at 08:50

## 2020-01-16 RX ADMIN — METOPROLOL TARTRATE 25 MG: 25 TABLET ORAL at 08:50

## 2020-01-16 ASSESSMENT — HEART SCORE: ECG: 0

## 2020-01-16 ASSESSMENT — PAIN SCALES - GENERAL: PAINLEVEL_OUTOF10: 0

## 2020-01-16 ASSESSMENT — PAIN DESCRIPTION - PROGRESSION: CLINICAL_PROGRESSION: NOT CHANGED

## 2020-01-16 NOTE — CONSULTS
Inpatient Cardiology Consultation      Reason for Consult:  Chest pain     Consulting Physician: Dr. Jayson Corbett    Requesting Physician:  Dr. Jackie Goff     Date of Consultation: 1/16/2020    HISTORY OF PRESENT ILLNESS:   Ms. Tra Early is a 66year old  female who was seen in initial consultation on 10/02/2016 for new onset Atrial Fibrillation and was placed on anticoagulation with Eliquis at that time. Her medical history also includes HTN, HLD, obesity, GERD, chronic pain syndrome, and reported unremarkable cardiac catheterization in 2006. Ms. Tra Early presented to [facility] ED on 01/15/2020 with complaints of heart burn. She states \"when I eat late, I usually wake up with heartburn\". She states that on 01/15/2020 she woke with midsternal \"burning\" and took TUMS with complete alleviation and states two hours later her chest burning recurred and lasted several hours and was worse \"when I layed down\". She denies change with exertion or accompanying dyspnea. She states that her pain worsened \"after I drank Pepsi\". She denies accompanying abdominal pain, vomiting and diarrhea. She states that she has chronic BLE with her R>L since a prior fall. Upon arrival to the ED her VS were 99.2-68-/80-97% on RA. EKG SR with SVR. She was admitted to a telemetry monitored unit. Cardiology was consulted for management for chest pain. Please note: past medical records were reviewed per electronic medical record (EMR) - see detailed reports under Past Medical/ Surgical History. Past Medical and Surgical History:     1. Reported \"normal\" cardiac catheterization in 2006 after a abnormal stress test at [facility] (reports requested, currently unavailable). 2. Echocardiogram 10/02/2016: EF 60-70%. AV mild sclerosis. 3. HTN  4. HLD  5. Permanent Atrial Fibrillation   6. Chronic anticoagulation with Eliquis  7. Obesity  8. GERD  9. Chronic Pain Syndrome  10.  S/p Right knee replacement 2010, appendectomy, and cholecystectomy  11. Hx of postoperative pulmonary emboli in 2010 after knee replacement, placed on Coumadin      Medications Prior to admit:  Prior to Admission medications    Medication Sig Start Date End Date Taking? Authorizing Provider   calcium carbonate (TUMS) 500 MG chewable tablet Take 2 tablets by mouth 2 times daily as needed for Heartburn OTC   Yes Historical Provider, MD   diltiazem (CARDIZEM CD) 360 MG extended release capsule Take 1 capsule by mouth daily 11/4/19 2/2/20 Yes Lonnie Coepland, DO   apixaban (ELIQUIS) 5 MG TABS tablet Take 1 tablet by mouth 2 times daily 11/4/19 2/2/20 Yes Lonnie Copeland, DO   metoprolol tartrate (LOPRESSOR) 25 MG tablet Take 1 tablet by mouth 2 times daily 11/4/19 2/2/20 Yes Yvette Foy,        Current Medications:    Current Facility-Administered Medications: apixaban (ELIQUIS) tablet 5 mg, 5 mg, Oral, BID  diltiazem (CARDIZEM CD) extended release capsule 360 mg, 360 mg, Oral, Daily  metoprolol tartrate (LOPRESSOR) tablet 25 mg, 25 mg, Oral, BID  magnesium hydroxide (MILK OF MAGNESIA) 400 MG/5ML suspension 30 mL, 30 mL, Oral, Daily PRN  ondansetron (ZOFRAN) injection 4 mg, 4 mg, Intravenous, Q6H PRN  atorvastatin (LIPITOR) tablet 40 mg, 40 mg, Oral, Nightly    Allergies:  Aspirin and Penicillins    Social History:    Denies tobacco, alcohol, illicit drug use. Drinks occasional iced tea, and Pepsi daily. Family History:   Please note this information was not obtained at this time as it is limited in nature due to the patient's advanced age. REVIEW OF SYSTEMS:     · Constitutional: Denies fevers, chills, night sweats, and fatigue  · HEENT: Denies headaches, nose bleeds, and blurred vision,oral pain, abscess or lesion. · Musculoskeletal: Denies falls, pain to BLE with ambulation and edema to BLE. · Neurological: Denies dizziness and lightheadedness, numbness and tingling  · Cardiovascular: Denies palpitations, and feelings of heart racing.  Complains of chest burning-see HPI   · Respiratory: Denies orthopnea and PND  · Gastrointestinal: Denies heartburn, nausea/vomiting, diarrhea and constipation, black/bloody, and tarry stools. · Genitourinary: Denies dysuria and hematuria  · Hematologic: Denies excessive bruising or bleeding  · Lymphatic: Denies lumps and bumps to neck, axilla, breast, and groin  · Endocrine: Denies excessive thirst. Denies intolerance to hot and cold  · GYN: Postmenopausal state; Denies vaginal bleeding. · Psychiatric: Denies anxiety and depression. PHYSICAL EXAM:   BP (!) 145/70   Pulse 73   Temp 98 °F (36.7 °C) (Oral)   Resp 18   Ht 5' 6\" (1.676 m)   Wt 248 lb (112.5 kg)   SpO2 96%   BMI 40.03 kg/m²   CONST:  Well developed, obese, elderly female who appears stated age. Awake, alert, cooperative, no apparent distress  HEENT:   Head- Normocephalic, atraumatic   Eyes- Conjunctivae pink, anicteric  Throat- Oral mucosa pink and moist  Neck-  No stridor, trachea midline, no jugular venous distention. No adenopathy   CHEST: Chest symmetrical and non-tender to palpation. No accessory muscle use or intercostal retractions  RESPIRATORY: Lung sounds - clear throughout fields   CARDIOVASCULAR:     No carotid bruit  Heart Inspection- shows no noted pulsations  Heart Palpation- no heaves or thrills; PMI is non-displaced   Heart Ausculation- Irregular rate and rhythm, no murmur. No s3, or rub   PV: trace bilateral lower extremity edema. No varicosities. Pedal pulses palpable, no clubbing or cyanosis   ABDOMEN: Soft, obese, non-tender to light palpation. Bowel sounds present. No palpable masses no organomegaly; no abdominal bruit  MS: Good muscle strength and tone. No atrophy or abnormal movements. : Deferred  SKIN: Warm and dry no statis dermatitis or ulcers   NEURO / PSYCH: Oriented to person, place and time. Speech clear and appropriate. Follows all commands.  Pleasant affect     DATA:    ECG: Atrial Fibrillation with CVR  Tele strips: A Fib with HR in the 70s  Diagnostic:    No intake or output data in the 24 hours ending 01/16/20 0758    Labs:   CBC:   Recent Labs     01/15/20  1524 01/16/20  0438   WBC 7.0 6.5   HGB 14.0 14.0   HCT 43.3 42.6    243     BMP:   Recent Labs     01/15/20  1524 01/16/20  0438    138   K 5.0 4.0   CO2 25 22   BUN 23 17   CREATININE 1.2* 1.1*   LABGLOM 44 48   CALCIUM 9.8 9.1   HgA1c:   Lab Results   Component Value Date    LABA1C 5.7 06/20/2017   CARDIAC ENZYMES:  Recent Labs     01/15/20  1524 01/15/20  2154 01/16/20  0438   TROPONINI <0.01 <0.01 <0.01     FASTING LIPID PANEL:  Lab Results   Component Value Date    CHOL 194 01/16/2020    HDL 40 01/16/2020    LDLCALC 130 01/16/2020    TRIG 122 01/16/2020     LIVER PROFILE:  Recent Labs     01/15/20  1524 01/16/20  0438   AST 14 13   ALT 11 11   LABALBU 4.1 3.8     01/15/2020 CXR:   No evidence for acute cardiopulmonary process. IMPRESSION and PLAN to follow as per Dr. Lenore Hemphill    Electronically signed by KIMBERLY Dewitt CNP on 1/16/2020 at 7:58 AM     I independently interviewed and examined the patient. I have reviewed the above documentation completed by the ARTEM. Please see my additional contributions to the HPI, physical exam, and assessment / medical decision making.     Reason for consult: Chest pain. She was previously known to Dr. Jb Rod cardiology.     Mrs. Coulter is a 66-year-old female with history of abnormal stress test in 2006 and underwent cardiac cath at [facility] and was noted to have normal coronaries as per patient. She has history of hypertension, hyperlipidemia, history of right total knee replacement underwent in 2010 and postoperatively she developed PE which was treated with warfarin. She was diagnosed with atrial fibrillation in 2016 and was initiated on Eliquis 5 mg twice daily for anticoagulation therapy.   She never followed up with any cardiologist.  It seems she remained in atrial with a heart rate in the 80s, no new tachy/bradyarrhythmias overnight     EKG: Atrial fibrillation with controlled rate, abnormal EKG.    Vitals: Blood pressure 147/73 with heart rate of 82     Labs were reviewed: Bun/creatinine 17/1.1 electrolytes are normal, cholesterol 194, HDL 40, , triglycerides 122, troponins less than 0.01×2, liver functions are normal, glucose 104, CBC was normal.     TTE- 10/1/2016: LVEF 60 to 70%, no significant valvular heart disease, no pulmonary hypertension, normal RV size and function.     Assessment:  · Nonanginal chest pain with negative troponins and no ST-T changes on the EKG. She does have intermittent probability for coronary disease and needs to rule out ischemia. · Permanent atrial fibrillation with controlled rate on Eliquis for anticoagulation  · Hypertension  · Hyperlipidemia  · GERD. ·  History of postop PE following total knee replacement     Plan:  · Agree with Lexiscan nuclear stress test to rule out ischemia. Keep her n.p.o.  · Continue Cardizem metoprolol for rate control and Eliquis for anticoagulation. · Continue Lipitor for hyperlipidemia  · Consider adding PPI for her GERD/acid reflux disease. · If the stress test comes back negative for ischemia then she is stable for discharge from the cardiology standpoint.     Thank you for consulting and allowing us to participate in Mrs. Coulter's care.  Jacky Modi MD, [address] [place] [address] Cardiology

## 2020-01-16 NOTE — PROCEDURES
Pharmacologic Nuclear Stress Test    Cardiologist: Jerri Nolen MD    Date: 1/16/2020    Indication: Chest pain    Description of procedure:    Protocol: Regadenoson stress myocardial perfusion imaging    Baseline EKG: Afib 71 bpm.  Normal axis/intervals. No ST T changes. Baseline BP: 110/83 mmHg    0.4 mg of regadenoson was injected followed by radiotracer injection. Patient reported no chest pain. Stress EKG showed no evidence of ischemia, and no arrhythmias were noted. Impression:  1. No evidence of regadenoson induced ischemia on stress EKG. 2.  Nuclear images to be reported separately.     Jaden Hui MD  [facility]) Cardiology

## 2020-01-16 NOTE — H&P
weakness  Psychiatric: depression, suicidal ideation, anxiety  Endocrine: unintentional weight change  Hematologic/Lymphatic: lymphadenopathy, easy bruising, easy bleeding   Allergic/Immunologic: recurrent infections      Objective  VITALS:  BP (!) 147/73   Pulse 82   Temp 98.7 °F (37.1 °C) (Oral)   Resp 18   Ht 5' 6\" (1.676 m)   Wt 248 lb (112.5 kg)   SpO2 98%   BMI 40.03 kg/m²     Physical Exam:  General: awake, alert, oriented to person, place, time, and purpose, appears stated age, cooperative, no acute distress, pleasant, appropriate mood  Eyes: conjunctivae/corneas clear, sclera non icteric, EOMI  Ears: no obvious scars, no lesions, no masses, hearing intact  Mouth: mucous membranes moist, no obvious oral sores  Head: normocephalic, atraumatic  Neck: no JVD, no adenopathy, no thyromegaly, neck is supple, trachea is midline  Back: ROM normal, no CVA tenderness.   Chest: no pain on palpation  Lungs: clear to auscultation bilaterally, without rhonchi, crackle, wheezing, or rale, no retractions or use of accessory muscles  Heart: regular rate and regular rhythm, no murmur, normal S1, S2  Abdomen: soft, non-tender; bowel sounds normal; no masses, no organomegaly  : Deferred   Extremities: no lower extremity edema, extremities atraumatic, no cyanosis, no clubbing, 2+ pedal pulses palpated  Skin: normal color, normal texture, normal turgor, no rashes, no lesions  Neurologic:5/5 muscle strength throughout, normal muscle tone throughout, face symmetric, hearing intact, tongue midline, speech appropriate without slurring, sensation to fine touch intact in upper and lower extremities    Labs-   Lab Results   Component Value Date    WBC 6.5 01/16/2020    HGB 14.0 01/16/2020    HCT 42.6 01/16/2020     01/16/2020     01/16/2020    K 4.0 01/16/2020     01/16/2020    CREATININE 1.1 (H) 01/16/2020    BUN 17 01/16/2020    CO2 22 01/16/2020    GLUCOSE 104 (H) 01/16/2020    ALT 11 01/16/2020    AST 13 01/16/2020    INR 1.1 10/06/2016     Lab Results   Component Value Date    CKTOTAL 112 10/01/2016    CKMB 4.4 (H) 10/01/2016    TROPONINI <0.01 01/16/2020       Recent Radiological Studies:  XR CHEST PORTABLE   Final Result      No evidence for acute cardiopulmonary process. NM Cardiac Stress Test Nuclear Imaging    (Results Pending)       Assessment  Active Hospital Problems    Diagnosis    Chest pain [R07.9]     Priority: High    History of pulmonary embolism [Z86.711]    GERD (gastroesophageal reflux disease) [K21.9]    Hypertension [I10]    Chronic pain syndrome [G89.4]    Generalized osteoarthritis [M15.9]    Paroxysmal atrial fibrillation (HCC) [I48.0]    Obesity [E66.9]    History of DVT (deep vein thrombosis) [Z86.718]    Hyperlipidemia [E78.5]    Insomnia [G47.00]       Patient Active Problem List    Diagnosis Date Noted    Chest pain 01/15/2020     Priority: High    History of pulmonary embolism      following knee surgery      GERD (gastroesophageal reflux disease)     Hypertension     Chronic pain syndrome     Generalized osteoarthritis 12/20/2016    Paroxysmal atrial fibrillation ([address] 10/11/2016    Obesity     History of DVT (deep vein thrombosis)     Hyperlipidemia 10/01/2016    Insomnia 09/06/2016       Plan  Chest pain, likely GI related: Observation. Telemetry. Cardiology consultation--defer need for further cardiac testing to cardiology-plans for Lexiscan nuclear stress test. CE's. ASA. Lipid panel. Start PPI. Consider GI work-up as an outpatient if stress test negative. · Continue home medications  · Follow labs  · DVT prophylaxis. · Please see orders for further management and care. ·  for discharge planning  · Discharge plan: TBD pending testing-likely home later today    Geovani Grayson DO  1/16/2020  10:37 AM    I can be reached through [employer]. NOTE:  This report was transcribed using voice recognition software.   Every effort was made

## 2020-01-16 NOTE — CONSULTS
Systems:  Cardiac: As per HPI  General: No fever, chills  Pulmonary: As per HPI  GI: No nausea, vomiting  Musculoskeletal: SANDERS x 4, no focal motor deficits  Skin: Intact, no rashes  Neuro/Psych: No headache or seizures    Physical Exam:  BP (!) 147/73   Pulse 82   Temp 98.7 °F (37.1 °C) (Oral)   Resp 18   Ht 5' 6\" (1.676 m)   Wt 248 lb (112.5 kg)   SpO2 98%   BMI 40.03 kg/m²   Appearance: Awake, alert, no acute respiratory distress  Skin: Intact, no rash  Head: Normocephalic, atraumatic  ENMT: MMM, no rhinorrhea  Neck: Supple, no carotid bruits  Lungs: Clear to auscultation bilaterally. No wheezes, rales, or rhonchi. Cardiac: Irregularly irregular rate and rhythm, +S1S2, no murmurs apparent  Abdomen: Soft, +bowel sounds  Extremities: Moves all extremities x 4, no lower extremity edema  Neurologic: No focal motor deficits apparent, normal mood and affect    Telemetry findings reviewed: Atrial fibrillation with a heart rate in the 80s, no new tachy/bradyarrhythmias overnight    EKG: Atrial fibrillation with controlled rate, abnormal EKG. Vitals: Blood pressure 147/73 with heart rate of 82    Labs were reviewed: Bun/creatinine 17/1.1 electrolytes are normal, cholesterol 194, HDL 40, , triglycerides 122, troponins less than 0.01×2, liver functions are normal, glucose 104, CBC was normal.    TTE- 10/1/2016: LVEF 60 to 70%, no significant valvular heart disease, no pulmonary hypertension, normal RV size and function. Assessment:  · Nonanginal chest pain with negative troponins and no ST-T changes on the EKG. She does have intermittent probability for coronary disease and needs to rule out ischemia. · Permanent atrial fibrillation with controlled rate on Eliquis for anticoagulation  · Hypertension  · Hyperlipidemia  · GERD. ·  History of postop PE following total knee replacement    Plan:  · Agree with Lexiscan nuclear stress test to rule out ischemia.   Keep her n.p.o.  · Continue Cardizem

## 2020-01-16 NOTE — PROGRESS NOTES
Notified patient's sister Zuly Sebastian at [phone] that the patient is in the hospital, provided the patients room number and room phone number per patient request.

## 2020-01-16 NOTE — PLAN OF CARE
Problem: Falls - Risk of:  Goal: Will remain free from falls  Description  Will remain free from falls  1/16/2020 1339 by Nica Nolasco RN  Outcome: Met This Shift  1/16/2020 0028 by Donta Sharpe RN  Outcome: Met This Shift  Goal: Absence of physical injury  Description  Absence of physical injury  1/16/2020 1339 by Nica Nolasco RN  Outcome: Met This Shift  1/16/2020 0028 by Donta Sharpe RN  Outcome: Met This Shift     Problem: Pain:  Goal: Pain level will decrease  Description  Pain level will decrease  1/16/2020 1339 by Nica Nolasco RN  Outcome: Met This Shift  1/16/2020 0028 by Donta Sharpe RN  Outcome: Met This Shift  Goal: Control of acute pain  Description  Control of acute pain  1/16/2020 1339 by Nica Nolasco RN  Outcome: Met This Shift  1/16/2020 0028 by Donta Sharpe RN  Outcome: Met This Shift  Goal: Control of chronic pain  Description  Control of chronic pain  1/16/2020 1339 by Nica Nolasco RN  Outcome: Met This Shift  1/16/2020 0028 by Donta Sharpe RN  Outcome: Met This Shift

## 2020-01-16 NOTE — PROGRESS NOTES
P Quality Flow/Interdisciplinary Rounds Progress Note        Quality Flow Rounds held on January 16, 2020    Disciplines Attending:  Bedside Nurse and charge nurse    Maty Wilder was admitted on 1/15/2020  4:15 PM    Anticipated Discharge Date:  Expected Discharge Date: 01/16/20(estimate)    Disposition:    Alphonse Score:  Alphonse Scale Score: 23    Readmission Risk              Risk of Unplanned Readmission:        8           Discussed patient goal for the day, patient clinical progression, and barriers to discharge.   The following Goal(s) of the Day/Commitment(s) have been identified:  stresas test and probable discharge if negative      [facility]  January 16, 2020

## 2020-01-16 NOTE — DISCHARGE SUMMARY
The patient was discharged on the same day as history and physical.  Please see history and physical as well as imaging studies, consult and evaluations, and nurse's notes.     Electronically signed by Tor Norton DO on 1/16/2020 at 5:16 PM

## 2020-01-16 NOTE — CARE COORDINATION
Social Work/Discharge Planning:  Met with patient and completed initial assessment. Explained Social Work role and discussed transition of care/discharge planning. Patient lives alone in a one story condo. PTA patient independent with no adaptive device. Patient has no dme. Patient has a home health care history with [facility]. She states she has a history at [facility] for only one day. Patient plans to return home at discharge and denies any home care needs at this time. Will continue to follow and assist if needed.   Electronically signed by CHIN Mattson on 1/16/2020 at 11:04 AM

## 2020-01-17 ENCOUNTER — TELEPHONE (OUTPATIENT)
Dept: CARDIOLOGY CLINIC | Age: 75
End: 2020-01-17

## 2020-01-20 ENCOUNTER — TELEPHONE (OUTPATIENT)
Dept: ADMINISTRATIVE | Age: 75
End: 2020-01-20

## 2020-01-22 ENCOUNTER — OFFICE VISIT (OUTPATIENT)
Dept: PRIMARY CARE CLINIC | Age: 75
End: 2020-01-22
Payer: MEDICARE

## 2020-01-22 VITALS
WEIGHT: 225 LBS | TEMPERATURE: 98.6 F | HEART RATE: 64 BPM | DIASTOLIC BLOOD PRESSURE: 84 MMHG | BODY MASS INDEX: 36.32 KG/M2 | SYSTOLIC BLOOD PRESSURE: 126 MMHG | OXYGEN SATURATION: 98 %

## 2020-01-22 PROBLEM — L57.0 ACTINIC KERATOSES: Status: ACTIVE | Noted: 2020-01-22

## 2020-01-22 PROBLEM — M17.12 PRIMARY OSTEOARTHRITIS OF LEFT KNEE: Status: ACTIVE | Noted: 2020-01-22

## 2020-01-22 PROCEDURE — 99214 OFFICE O/P EST MOD 30 MIN: CPT | Performed by: FAMILY MEDICINE

## 2020-01-22 PROCEDURE — 1111F DSCHRG MED/CURRENT MED MERGE: CPT | Performed by: FAMILY MEDICINE

## 2020-01-22 RX ORDER — ACETAMINOPHEN AND CODEINE PHOSPHATE 300; 30 MG/1; MG/1
1 TABLET ORAL EVERY 4 HOURS PRN
Qty: 42 TABLET | Refills: 0 | Status: SHIPPED | OUTPATIENT
Start: 2020-01-22 | End: 2020-01-29

## 2020-01-22 ASSESSMENT — ENCOUNTER SYMPTOMS
BACK PAIN: 0
DIARRHEA: 0
COUGH: 0
ABDOMINAL PAIN: 0
VOMITING: 0
CONSTIPATION: 0
SHORTNESS OF BREATH: 0
NAUSEA: 0
WHEEZING: 0

## 2020-01-22 ASSESSMENT — PATIENT HEALTH QUESTIONNAIRE - PHQ9
SUM OF ALL RESPONSES TO PHQ QUESTIONS 1-9: 0
SUM OF ALL RESPONSES TO PHQ QUESTIONS 1-9: 0
2. FEELING DOWN, DEPRESSED OR HOPELESS: 0
SUM OF ALL RESPONSES TO PHQ9 QUESTIONS 1 & 2: 0
1. LITTLE INTEREST OR PLEASURE IN DOING THINGS: 0

## 2020-01-22 NOTE — PROGRESS NOTES
apicoseptal and apicolateral walls without corresponding wall motion abnormality. She was started on Protonix and discharged home. She states that she hasn't had any further chest pain since discharge and is taking Protonix without difficulty. She does complain of worsening left knee pain at this time. Has known OA in her knees- had right knee replaced by Dr. Fredis Rose. States that she was standing at the kitchen sink several days ago and twisted wrong and it has been bothering her ever since. She used to take Tylenol #3 from Dr. Jeffery Heart, but hasn't needed it for a while until this recent injury. Requesting a temporary Rx until she can get in with Dr. Fredis Rose. Review of Systems   Constitutional: Negative for chills, fatigue and fever. Respiratory: Negative for cough, shortness of breath and wheezing. Cardiovascular: Negative for chest pain (Resolved) and palpitations. Gastrointestinal: Negative for abdominal pain, constipation, diarrhea, nausea and vomiting. Musculoskeletal: Positive for arthralgias. Negative for back pain. Skin: Negative for rash. Neurological: Negative for dizziness and headaches. Psychiatric/Behavioral: Negative for dysphoric mood. The patient is not nervous/anxious. All other systems reviewed and are negative. Vitals:    01/22/20 0923   BP: 126/84   Pulse: 64   Temp: 98.6 °F (37 °C)   SpO2: 98%   Weight: 225 lb (102.1 kg)     Body mass index is 36.32 kg/m². Wt Readings from Last 3 Encounters:   01/22/20 225 lb (102.1 kg)   01/15/20 248 lb (112.5 kg)   01/15/20 228 lb (103.4 kg)     BP Readings from Last 3 Encounters:   01/22/20 126/84   01/16/20 126/64   01/15/20 (!) 144/80       Physical Exam  Vitals signs and nursing note reviewed. Constitutional:       General: She is not in acute distress. Appearance: Normal appearance. She is well-developed. She is obese. HENT:      Head: Normocephalic and atraumatic.       Right Ear: Hearing and external ear normal.      Left Ear: Hearing and external ear normal.      Nose: Nose normal.      Mouth/Throat:      Mouth: Mucous membranes are moist.   Eyes:      General: Lids are normal. No scleral icterus. Extraocular Movements: Extraocular movements intact. Conjunctiva/sclera: Conjunctivae normal.   Neck:      Musculoskeletal: Normal range of motion and neck supple. Thyroid: No thyromegaly. Cardiovascular:      Rate and Rhythm: Normal rate and regular rhythm. Heart sounds: Normal heart sounds. No murmur. Pulmonary:      Effort: Pulmonary effort is normal. No respiratory distress. Breath sounds: Normal breath sounds. No wheezing. Musculoskeletal:         General: No tenderness or deformity. Right lower leg: No edema. Left lower leg: No edema. Lymphadenopathy:      Cervical: No cervical adenopathy. Skin:     General: Skin is warm and dry. Findings: No rash. Comments: AKs noted to cheeks and forehead   Neurological:      General: No focal deficit present. Mental Status: She is alert and oriented to person, place, and time. Psychiatric:         Mood and Affect: Mood and affect normal.         Speech: Speech normal.         Behavior: Behavior normal.         Thought Content: Thought content normal.         Assessment/Plan:  1. Other chest pain  Resolved. Secondary to GERD. Follow up with Dr. Kristy Mckeon in March. Encouraged her to keep her appointment. - [place] DISCHARGE MEDS RECONCILED W/ CURRENT OUTPATIENT MED LIST    2. Primary osteoarthritis of left knee  Hasn't had Tylenol #3 in a while. Will give her temporary Rx for PRN use. Will follow up with Dr. Kristy Mckeon  - acetaminophen-codeine (TYLENOL/CODEINE #3) 300-30 MG per tablet; Take 1 tablet by mouth every 4 hours as needed for Pain for up to 7 days. Intended supply: 7 days. Take lowest dose possible to manage pain  Dispense: 42 tablet; Refill: 0    3.  Gastroesophageal reflux disease, esophagitis presence not

## 2020-02-20 NOTE — TELEPHONE ENCOUNTER
Patient calling for script tylenol #3. States she injured her knee and cannot see Dr. Sergio So right away.   [place]

## 2020-02-24 ENCOUNTER — OFFICE VISIT (OUTPATIENT)
Dept: PRIMARY CARE CLINIC | Age: 75
End: 2020-02-24
Payer: MEDICARE

## 2020-02-24 VITALS
SYSTOLIC BLOOD PRESSURE: 132 MMHG | HEIGHT: 66 IN | WEIGHT: 228 LBS | TEMPERATURE: 96.4 F | HEART RATE: 50 BPM | DIASTOLIC BLOOD PRESSURE: 84 MMHG | OXYGEN SATURATION: 97 % | BODY MASS INDEX: 36.64 KG/M2

## 2020-02-24 PROCEDURE — 99213 OFFICE O/P EST LOW 20 MIN: CPT | Performed by: FAMILY MEDICINE

## 2020-02-24 RX ORDER — ACETAMINOPHEN AND CODEINE PHOSPHATE 300; 30 MG/1; MG/1
1 TABLET ORAL EVERY 8 HOURS PRN
Qty: 90 TABLET | Refills: 0 | Status: SHIPPED
Start: 2020-02-24 | End: 2020-05-11 | Stop reason: SDUPTHER

## 2020-02-24 RX ORDER — DILTIAZEM HYDROCHLORIDE 360 MG/1
CAPSULE, EXTENDED RELEASE ORAL
COMMUNITY
Start: 2020-02-15 | End: 2020-06-23 | Stop reason: SDUPTHER

## 2020-02-24 RX ORDER — ACETAMINOPHEN AND CODEINE PHOSPHATE 300; 30 MG/1; MG/1
TABLET ORAL
COMMUNITY
Start: 2020-02-03 | End: 2020-02-24 | Stop reason: SDUPTHER

## 2020-02-24 ASSESSMENT — ENCOUNTER SYMPTOMS
NAUSEA: 0
COUGH: 0
VOMITING: 0
DIARRHEA: 0
SHORTNESS OF BREATH: 0
CONSTIPATION: 0
BACK PAIN: 0
ABDOMINAL PAIN: 0
WHEEZING: 0

## 2020-02-24 NOTE — PROGRESS NOTES
20  [employer] : 1945 Sex: female  Age: 66 y.o. Chief Complaint   Patient presents with    Knee Pain     L knee - has recently been getting sharp pains x1 week; see kurt next week      HPI:  66 y.o. female presents for acute visit due to knee pain. Knee Pain  Patient presents with knee pain involving the left knee. Onset of the symptoms was several years ago. Inciting event: this is a longstanding problem which has been getting worse. Current symptoms include crepitus sensation, pain located medially, stiffness and swelling. Pain is aggravated by any weight bearing, going up and down stairs, rising after sitting, squatting and walking. Patient has had prior knee problems. Evaluation to date: plain films: OA and Ortho consult: patient sees Dr. Fuentes Harrington. Has been told that she needs to have the knee replaced, but hasn't gone through with it yet    Treatment to date: avoidance of offending activity, corticosteroid injection which was somewhat effective and rest.     ROS:  Review of Systems   Constitutional: Negative for chills, fatigue and fever. Respiratory: Negative for cough, shortness of breath and wheezing. Cardiovascular: Negative for chest pain and palpitations. Gastrointestinal: Negative for abdominal pain, constipation, diarrhea, nausea and vomiting. Musculoskeletal: Positive for arthralgias. Negative for back pain. Skin: Negative for rash. Neurological: Negative for dizziness and headaches. Psychiatric/Behavioral: Negative for dysphoric mood. The patient is not nervous/anxious. All other systems reviewed and are negative.        Current Outpatient Medications on File Prior to Visit   Medication Sig Dispense Refill    dilTIAZem (TIAZAC) 360 MG extended release capsule       metoprolol tartrate (LOPRESSOR) 25 MG tablet Take 25 mg by mouth 2 times daily      calcium carbonate (TUMS) 500 MG chewable tablet Take 2 tablets by mouth 2 times daily as needed states that they are discussing surgery at that appointment. She is hoping to have surgery in May. Return if symptoms worsen or fail to improve.       Seen By:  Anthony Fernandes, DO

## 2020-05-11 RX ORDER — ACETAMINOPHEN AND CODEINE PHOSPHATE 300; 30 MG/1; MG/1
1 TABLET ORAL EVERY 8 HOURS PRN
Qty: 90 TABLET | Refills: 0 | Status: SHIPPED
Start: 2020-05-11 | End: 2020-06-10

## 2020-06-23 RX ORDER — APIXABAN 5 MG/1
5 TABLET, FILM COATED ORAL
COMMUNITY
Start: 2020-05-02 | End: 2020-06-23 | Stop reason: SDUPTHER

## 2020-06-23 RX ORDER — DILTIAZEM HYDROCHLORIDE 360 MG/1
360 CAPSULE, EXTENDED RELEASE ORAL DAILY
Qty: 90 CAPSULE | Refills: 1 | Status: SHIPPED
Start: 2020-06-23 | End: 2020-09-10

## 2020-07-21 ENCOUNTER — TELEPHONE (OUTPATIENT)
Dept: PRIMARY CARE CLINIC | Age: 75
End: 2020-07-21

## 2020-07-21 RX ORDER — ACETAMINOPHEN AND CODEINE PHOSPHATE 300; 30 MG/1; MG/1
1 TABLET ORAL EVERY 8 HOURS PRN
Qty: 90 TABLET | Refills: 0 | Status: SHIPPED
Start: 2020-07-21 | End: 2020-08-20

## 2020-07-21 NOTE — TELEPHONE ENCOUNTER
Patient is calling for a refill on Tylenol #3 300-30. Not in the pts chart to pend. She uses [address] [place]. Will you fill? Please advise.

## 2020-08-31 ENCOUNTER — TELEPHONE (OUTPATIENT)
Dept: ADMINISTRATIVE | Age: 75
End: 2020-08-31

## 2020-08-31 RX ORDER — ACETAMINOPHEN AND CODEINE PHOSPHATE 300; 30 MG/1; MG/1
1 TABLET ORAL EVERY 8 HOURS PRN
Qty: 90 TABLET | Refills: 0 | Status: SHIPPED
Start: 2020-08-31 | End: 2020-09-30

## 2020-08-31 NOTE — TELEPHONE ENCOUNTER
PT is requesting appt this week or next week in [place] for ear pain and back pain. Please contact pt to schedule.     Thanks

## 2020-10-07 ENCOUNTER — OFFICE VISIT (OUTPATIENT)
Dept: PRIMARY CARE CLINIC | Age: 75
End: 2020-10-07
Payer: MEDICARE

## 2020-10-07 VITALS
OXYGEN SATURATION: 98 % | BODY MASS INDEX: 37.61 KG/M2 | WEIGHT: 234 LBS | HEART RATE: 70 BPM | HEIGHT: 66 IN | SYSTOLIC BLOOD PRESSURE: 126 MMHG | DIASTOLIC BLOOD PRESSURE: 84 MMHG

## 2020-10-07 PROCEDURE — 99214 OFFICE O/P EST MOD 30 MIN: CPT | Performed by: FAMILY MEDICINE

## 2020-10-07 RX ORDER — ACETAMINOPHEN AND CODEINE PHOSPHATE 300; 30 MG/1; MG/1
1 TABLET ORAL EVERY 8 HOURS PRN
Qty: 90 TABLET | Refills: 0 | Status: SHIPPED
Start: 2020-10-07 | End: 2020-11-18 | Stop reason: SDUPTHER

## 2020-10-07 RX ORDER — DILTIAZEM HYDROCHLORIDE 360 MG/1
CAPSULE, EXTENDED RELEASE ORAL
Qty: 30 CAPSULE | Refills: 5 | Status: SHIPPED
Start: 2020-10-07 | End: 2020-11-18 | Stop reason: SDUPTHER

## 2020-10-07 RX ORDER — APIXABAN 5 MG/1
TABLET, FILM COATED ORAL
COMMUNITY
Start: 2020-10-03 | End: 2020-10-07 | Stop reason: SDUPTHER

## 2020-10-07 ASSESSMENT — ENCOUNTER SYMPTOMS
COUGH: 0
ABDOMINAL PAIN: 0
CONSTIPATION: 0
DIARRHEA: 0
SHORTNESS OF BREATH: 0
VOMITING: 0
WHEEZING: 0
NAUSEA: 0
BACK PAIN: 0

## 2020-10-07 NOTE — PROGRESS NOTES
10/7/20  Nakul Castillo : 1945 Sex: female  Age: 66 y.o. Chief Complaint   Patient presents with    Discuss Medications     pt is currently in the donut hole and eliquis is going to be 300 dollars - can she take something else     HPI:  66 y.o. female presents today for 5 month(s) follow up of chronic medical conditions, medication refills and FBW. Patient's chart, medical, surgical and medication history all reviewed. Atrial Fibrillation  Patient has atrial fibrillation. Previously seen by Dr. Kris Lindsey. Rate controlled on Metoprolol and Diltiazem. Current rhythm: irregular. Anticoagulated on Eliquis   Known history of atrial fibrillation: yes  Valvular disease: No  Associated symptoms: none  Previous cardioversion and/or ablation: no  History of CAD: No  History of sleep apnea: No  History of ETOH abuse/drug abuse: No  History of thyroid disease: No    No palpitations, dizziness, shortness of breath, chest pain, racing heart, bruising or bleeding. Knee Pain  Patient presents with knee pain involving the left knee. Onset of the symptoms was several years ago. Inciting event: this is a longstanding problem which has been getting worse.      Current symptoms include crepitus sensation, pain located medially, stiffness and swelling. Pain is aggravated by any weight bearing, going up and down stairs, rising after sitting, squatting and walking. Patient has had prior knee problems.      Evaluation to date: plain films: OA and Ortho consult: patient sees Dr. Jocelyne Nation. Has been told that she needs to have the knee replaced, but hasn't gone through with it yet     Treatment to date: avoidance of offending activity, corticosteroid injection which was effective and rest.    Patient was supposed to have TKA in , but was delayed due to [place]. Went to an urgent care in July where she had a steroid injection and noted significant improvement in pain.   Seeing Dr. Jocelyne Nation soon to discuss more injections vs replacement. ROS:  Review of Systems   Constitutional: Negative for chills, fatigue and fever. Respiratory: Negative for cough, shortness of breath and wheezing. Cardiovascular: Negative for chest pain and palpitations. Gastrointestinal: Negative for abdominal pain, constipation, diarrhea, nausea and vomiting. Musculoskeletal: Positive for arthralgias. Negative for back pain. Skin: Negative for rash. Neurological: Negative for dizziness and headaches. Psychiatric/Behavioral: Negative for dysphoric mood. The patient is not nervous/anxious. All other systems reviewed and are negative. Current Outpatient Medications:     metoprolol tartrate (LOPRESSOR) 25 MG tablet, Take 25 mg by mouth 2 times daily, Disp: , Rfl:     dilTIAZem (TIAZAC) 360 MG extended release capsule, TAKE ONE CAPSULE BY MOUTH EVERY DAY, Disp: 30 capsule, Rfl: 5    apixaban (ELIQUIS) 5 MG TABS tablet, Take 1 tablet by mouth 2 times daily, Disp: 60 tablet, Rfl: 5    acetaminophen-codeine (TYLENOL/CODEINE #3) 300-30 MG per tablet, Take 1 tablet by mouth every 8 hours as needed for Pain for up to 30 days. Intended supply: 30 days.  Take lowest dose possible to manage pain, Disp: 90 tablet, Rfl: 0    calcium carbonate (TUMS) 500 MG chewable tablet, Take 2 tablets by mouth 2 times daily as needed for Heartburn OTC, Disp: , Rfl:     Allergies   Allergen Reactions    Penicillins Hives       Past Medical History:   Diagnosis Date    Acute renal failure syndrome ([facility] [address] 10/1/16  10/07/2016    2ry severe dehydration     Chest pain 01/15/2020    R/O ACS- normal stress    Chronic pain syndrome     Colon polyps     Microscopic colitis    Degenerative joint disease     Involving multiple joints    Diarrhea     2ry C-Diff 10/1/2016    GERD (gastroesophageal reflux disease)     History of DVT (deep vein thrombosis)     History of pulmonary embolism     following knee surgery    Hyperlipidemia     Hypertension  Insomnia     denies as of 1/15/20    Obesity     Paroxysmal atrial fibrillation (HCC)     Syncope and collapse      Past Surgical History:   Procedure Laterality Date    APPENDECTOMY      CARDIOVASCULAR STRESS TEST  01/16/2020    Normal    CHOLECYSTECTOMY      COLONOSCOPY  10/07/2016    JOINT REPLACEMENT Right 2012    TKA     Family History   Problem Relation Age of Onset    Pancreatic Cancer Brother     Diabetes type 2  Brother     Diabetes type 2  Mother     Other Father         AAA    Diabetes type 2  Sister     Pancreatic Cancer Brother      Social History     Socioeconomic History    Marital status: Single     Spouse name: Not on file    Number of children: Not on file    Years of education: Not on file    Highest education level: Not on file   Occupational History    Not on file   Social Needs    Financial resource strain: Not on file    Food insecurity     Worry: Not on file     Inability: Not on file    Transportation needs     Medical: Not on file     Non-medical: Not on file   Tobacco Use    Smoking status: Never Smoker    Smokeless tobacco: Never Used   Substance and Sexual Activity    Alcohol use: No    Drug use: No    Sexual activity: Not on file   Lifestyle    Physical activity     Days per week: Not on file     Minutes per session: Not on file    Stress: Not on file   Relationships    Social connections     Talks on phone: Not on file     Gets together: Not on file     Attends Confucianism service: Not on file     Active member of club or organization: Not on file     Attends meetings of clubs or organizations: Not on file     Relationship status: Not on file    Intimate partner violence     Fear of current or ex partner: Not on file     Emotionally abused: Not on file     Physically abused: Not on file     Forced sexual activity: Not on file   Other Topics Concern    Not on file   Social History Narrative    Not on file       Vitals:    10/07/20 0927   BP: 126/84 Pulse: 70   SpO2: 98%   Weight: 234 lb (106.1 kg)   Height: 5' 6\" (1.676 m)       Physical Exam:  Physical Exam  Vitals signs and nursing note reviewed. Constitutional:       General: She is not in acute distress. Appearance: Normal appearance. She is well-developed. She is obese. She is not ill-appearing. HENT:      Head: Normocephalic and atraumatic. Eyes:      General: No scleral icterus. Conjunctiva/sclera: Conjunctivae normal.   Neck:      Musculoskeletal: Normal range of motion and neck supple. No muscular tenderness. Thyroid: No thyromegaly. Cardiovascular:      Rate and Rhythm: Normal rate. Rhythm irregular. Heart sounds: Normal heart sounds. No murmur. Pulmonary:      Effort: Pulmonary effort is normal. No respiratory distress. Breath sounds: Normal breath sounds. No wheezing. Musculoskeletal: Normal range of motion. General: No tenderness or deformity. Right lower leg: No edema. Left lower leg: No edema. Lymphadenopathy:      Cervical: No cervical adenopathy. Skin:     General: Skin is warm and dry. Findings: No rash. Neurological:      General: No focal deficit present. Mental Status: She is alert and oriented to person, place, and time. Gait: Gait normal.   Psychiatric:         Mood and Affect: Mood normal.         Behavior: Behavior normal.         Thought Content:  Thought content normal.         Labs:  CBC with Differential:    Lab Results   Component Value Date    WBC 6.5 01/16/2020    RBC 4.33 01/16/2020    HGB 14.0 01/16/2020    HCT 42.6 01/16/2020     01/16/2020    MCV 98.4 01/16/2020    MCH 32.3 01/16/2020    MCHC 32.9 01/16/2020    RDW 12.6 01/16/2020    BANDSPCT 4 10/01/2016    LYMPHOPCT 23.0 01/15/2020    MONOPCT 8.6 01/15/2020    BASOPCT 0.7 01/15/2020    MONOSABS 0.60 01/15/2020    LYMPHSABS 1.60 01/15/2020    EOSABS 0.02 01/15/2020    BASOSABS 0.05 01/15/2020     CMP:    Lab Results   Component Value Date  01/16/2020    K 4.0 01/16/2020     01/16/2020    CO2 22 01/16/2020    BUN 17 01/16/2020    CREATININE 1.1 01/16/2020    GFRAA 58 01/16/2020    LABGLOM 48 01/16/2020    GLUCOSE 104 01/16/2020    PROT 6.9 01/16/2020    LABALBU 3.8 01/16/2020    CALCIUM 9.1 01/16/2020    BILITOT 0.4 01/16/2020    ALKPHOS 105 01/16/2020    AST 13 01/16/2020    ALT 11 01/16/2020     HgBA1c:    Lab Results   Component Value Date    LABA1C 5.7 06/20/2017     FLP:    Lab Results   Component Value Date    TRIG 122 01/16/2020    HDL 40 01/16/2020    LDLCALC 130 01/16/2020    LABVLDL 24 01/16/2020     TSH:    Lab Results   Component Value Date    TSH 1.290 10/01/2016        Assessment and Plan:  Agustin Fox was seen today for discuss medications. Diagnoses and all orders for this visit:    Paroxysmal atrial fibrillation (HCC)  -     dilTIAZem (TIAZAC) 360 MG extended release capsule; TAKE ONE CAPSULE BY MOUTH EVERY DAY  -     apixaban (ELIQUIS) 5 MG TABS tablet; Take 1 tablet by mouth 2 times daily  Stable. Follows with Dr. Elvie Velásquez. Discussed [employer] to try and get Eliquis covered for the two months. Essential hypertension  -     CBC Auto Differential; Future  -     Comprehensive Metabolic Panel; Future  -     Lipid Panel; Future  -     TSH without Reflex; Future  -     T4, Free; Future  -     Urinalysis; Future  Due for labs    Primary osteoarthritis of left knee  -     acetaminophen-codeine (TYLENOL/CODEINE #3) 300-30 MG per tablet; Take 1 tablet by mouth every 8 hours as needed for Pain for up to 30 days. Intended supply: 30 days. Take lowest dose possible to manage pain  OARRS reviewed and is appropriate. Impaired fasting blood sugar  -     Hemoglobin A1C; Future    Vitamin D insufficiency  -     Vitamin D 25 Hydroxy; Future        Return in about 6 months (around 4/7/2021) for AWV.       Seen By:  Robe Mention, DO

## 2020-11-06 DIAGNOSIS — E55.9 VITAMIN D INSUFFICIENCY: ICD-10-CM

## 2020-11-06 DIAGNOSIS — R73.01 IMPAIRED FASTING BLOOD SUGAR: ICD-10-CM

## 2020-11-06 DIAGNOSIS — I10 ESSENTIAL HYPERTENSION: ICD-10-CM

## 2020-11-06 LAB
ALBUMIN SERPL-MCNC: 4.3 G/DL (ref 3.5–5.2)
ALP BLD-CCNC: 86 U/L (ref 35–104)
ALT SERPL-CCNC: 17 U/L (ref 0–32)
ANION GAP SERPL CALCULATED.3IONS-SCNC: 16 MMOL/L (ref 7–16)
AST SERPL-CCNC: 15 U/L (ref 0–31)
BASOPHILS ABSOLUTE: 0.05 E9/L (ref 0–0.2)
BASOPHILS RELATIVE PERCENT: 0.6 % (ref 0–2)
BILIRUB SERPL-MCNC: 0.7 MG/DL (ref 0–1.2)
BILIRUBIN URINE: NEGATIVE
BLOOD, URINE: NEGATIVE
BUN BLDV-MCNC: 22 MG/DL (ref 8–23)
CALCIUM SERPL-MCNC: 9.9 MG/DL (ref 8.6–10.2)
CHLORIDE BLD-SCNC: 106 MMOL/L (ref 98–107)
CHOLESTEROL, TOTAL: 224 MG/DL (ref 0–199)
CLARITY: CLEAR
CO2: 21 MMOL/L (ref 22–29)
COLOR: YELLOW
CREAT SERPL-MCNC: 1.3 MG/DL (ref 0.5–1)
EOSINOPHILS ABSOLUTE: 0.07 E9/L (ref 0.05–0.5)
EOSINOPHILS RELATIVE PERCENT: 0.9 % (ref 0–6)
GFR AFRICAN AMERICAN: 48
GFR NON-AFRICAN AMERICAN: 40 ML/MIN/1.73
GLUCOSE BLD-MCNC: 104 MG/DL (ref 74–99)
GLUCOSE URINE: NEGATIVE MG/DL
HBA1C MFR BLD: 6 % (ref 4–5.6)
HCT VFR BLD CALC: 46.2 % (ref 34–48)
HDLC SERPL-MCNC: 51 MG/DL
HEMOGLOBIN: 14.8 G/DL (ref 11.5–15.5)
IMMATURE GRANULOCYTES #: 0.02 E9/L
IMMATURE GRANULOCYTES %: 0.2 % (ref 0–5)
KETONES, URINE: NEGATIVE MG/DL
LDL CHOLESTEROL CALCULATED: 150 MG/DL (ref 0–99)
LEUKOCYTE ESTERASE, URINE: NEGATIVE
LYMPHOCYTES ABSOLUTE: 1.55 E9/L (ref 1.5–4)
LYMPHOCYTES RELATIVE PERCENT: 19.1 % (ref 20–42)
MCH RBC QN AUTO: 32.5 PG (ref 26–35)
MCHC RBC AUTO-ENTMCNC: 32 % (ref 32–34.5)
MCV RBC AUTO: 101.5 FL (ref 80–99.9)
MONOCYTES ABSOLUTE: 0.66 E9/L (ref 0.1–0.95)
MONOCYTES RELATIVE PERCENT: 8.1 % (ref 2–12)
NEUTROPHILS ABSOLUTE: 5.76 E9/L (ref 1.8–7.3)
NEUTROPHILS RELATIVE PERCENT: 71.1 % (ref 43–80)
NITRITE, URINE: NEGATIVE
PDW BLD-RTO: 13.2 FL (ref 11.5–15)
PH UA: 5.5 (ref 5–9)
PLATELET # BLD: 296 E9/L (ref 130–450)
PMV BLD AUTO: 11.6 FL (ref 7–12)
POTASSIUM SERPL-SCNC: 5.3 MMOL/L (ref 3.5–5)
PROTEIN UA: NEGATIVE MG/DL
RBC # BLD: 4.55 E12/L (ref 3.5–5.5)
SODIUM BLD-SCNC: 143 MMOL/L (ref 132–146)
SPECIFIC GRAVITY UA: >=1.03 (ref 1–1.03)
T4 FREE: 1.57 NG/DL (ref 0.93–1.7)
TOTAL PROTEIN: 7.3 G/DL (ref 6.4–8.3)
TRIGL SERPL-MCNC: 114 MG/DL (ref 0–149)
TSH SERPL DL<=0.05 MIU/L-ACNC: 1.5 UIU/ML (ref 0.27–4.2)
UROBILINOGEN, URINE: 1 E.U./DL
VITAMIN D 25-HYDROXY: 11 NG/ML (ref 30–100)
VLDLC SERPL CALC-MCNC: 23 MG/DL
WBC # BLD: 8.1 E9/L (ref 4.5–11.5)

## 2020-11-06 RX ORDER — ERGOCALCIFEROL 1.25 MG/1
50000 CAPSULE ORAL WEEKLY
Qty: 12 CAPSULE | Refills: 1 | Status: SHIPPED
Start: 2020-11-06 | End: 2021-01-05

## 2020-11-18 RX ORDER — ACETAMINOPHEN AND CODEINE PHOSPHATE 300; 30 MG/1; MG/1
1 TABLET ORAL EVERY 8 HOURS PRN
Qty: 90 TABLET | Refills: 0 | Status: SHIPPED
Start: 2020-11-18 | End: 2020-12-18

## 2020-11-18 RX ORDER — DILTIAZEM HYDROCHLORIDE 360 MG/1
CAPSULE, EXTENDED RELEASE ORAL
Qty: 30 CAPSULE | Refills: 5 | Status: SHIPPED
Start: 2020-11-18 | End: 2021-01-07 | Stop reason: SDUPTHER

## 2021-01-05 RX ORDER — ERGOCALCIFEROL 1.25 MG/1
CAPSULE ORAL
Qty: 12 CAPSULE | Refills: 2 | Status: SHIPPED
Start: 2021-01-05 | End: 2021-03-29 | Stop reason: SDUPTHER

## 2021-01-05 NOTE — TELEPHONE ENCOUNTER
Last Appointment:  10/7/2020  Future Appointments   Date Time Provider Sara Faye   4/7/2021  9:00 AM Marguarite Schirmer, DO [facility]

## 2021-01-07 DIAGNOSIS — M17.12 PRIMARY OSTEOARTHRITIS OF LEFT KNEE: Primary | ICD-10-CM

## 2021-01-07 DIAGNOSIS — I48.0 PAROXYSMAL ATRIAL FIBRILLATION (HCC): ICD-10-CM

## 2021-01-07 RX ORDER — DILTIAZEM HYDROCHLORIDE 360 MG/1
CAPSULE, EXTENDED RELEASE ORAL
Qty: 30 CAPSULE | Refills: 5 | Status: SHIPPED
Start: 2021-01-07 | End: 2021-07-06 | Stop reason: SDUPTHER

## 2021-01-07 RX ORDER — ACETAMINOPHEN AND CODEINE PHOSPHATE 300; 30 MG/1; MG/1
1 TABLET ORAL EVERY 8 HOURS PRN
Qty: 90 TABLET | Refills: 1 | Status: SHIPPED
Start: 2021-01-07 | End: 2021-03-29 | Stop reason: SDUPTHER

## 2021-02-03 ENCOUNTER — IMMUNIZATION (OUTPATIENT)
Dept: PRIMARY CARE CLINIC | Age: 76
End: 2021-02-03
Payer: MEDICARE

## 2021-02-03 DIAGNOSIS — Z23 NEED FOR VACCINATION: Primary | ICD-10-CM

## 2021-02-03 PROCEDURE — 91300 COVID-19, PFIZER VACCINE 30MCG/0.3ML DOSE: CPT | Performed by: NURSE PRACTITIONER

## 2021-02-03 PROCEDURE — 0001A COVID-19, PFIZER VACCINE 30MCG/0.3ML DOSE: CPT | Performed by: NURSE PRACTITIONER

## 2021-02-24 ENCOUNTER — IMMUNIZATION (OUTPATIENT)
Dept: PRIMARY CARE CLINIC | Age: 76
End: 2021-02-24
Payer: MEDICARE

## 2021-02-24 PROCEDURE — 0002A COVID-19, PFIZER VACCINE 30MCG/0.3ML DOSE: CPT | Performed by: NURSE PRACTITIONER

## 2021-02-24 PROCEDURE — 91300 COVID-19, PFIZER VACCINE 30MCG/0.3ML DOSE: CPT | Performed by: NURSE PRACTITIONER

## 2021-03-28 ENCOUNTER — HOSPITAL ENCOUNTER (EMERGENCY)
Age: 76
Discharge: HOME | End: 2021-03-28
Payer: MEDICARE

## 2021-03-28 VITALS
DIASTOLIC BLOOD PRESSURE: 87 MMHG | BODY MASS INDEX: 36.96 KG/M2 | RESPIRATION RATE: 14 BRPM | HEIGHT: 66 IN | TEMPERATURE: 96.6 F | HEART RATE: 85 BPM | WEIGHT: 230 LBS | OXYGEN SATURATION: 96 % | SYSTOLIC BLOOD PRESSURE: 175 MMHG

## 2021-03-28 DIAGNOSIS — T23.162A: Primary | ICD-10-CM

## 2021-03-28 PROCEDURE — 99283 EMERGENCY DEPT VISIT LOW MDM: CPT

## 2021-03-28 RX ORDER — BACITRACIN, NEOMYCIN, POLYMYXIN B 400; 3.5; 5 [USP'U]/G; MG/G; [USP'U]/G
OINTMENT TOPICAL
Qty: 1 TUBE | Refills: 0 | Status: SHIPPED | OUTPATIENT
Start: 2021-03-28 | End: 2021-04-07

## 2021-03-28 RX ORDER — GINSENG 100 MG
CAPSULE ORAL ONCE
Status: DISCONTINUED | OUTPATIENT
Start: 2021-03-28 | End: 2021-03-28 | Stop reason: HOSPADM

## 2021-03-28 RX ORDER — DIAPER,BRIEF,INFANT-TODD,DISP
EACH MISCELLANEOUS
Status: DISCONTINUED
Start: 2021-03-28 | End: 2021-03-28 | Stop reason: HOSPADM

## 2021-03-28 ASSESSMENT — PAIN SCALES - GENERAL: PAINLEVEL_OUTOF10: 8

## 2021-03-28 ASSESSMENT — PAIN DESCRIPTION - ORIENTATION: ORIENTATION: LEFT

## 2021-03-28 NOTE — ED PROVIDER NOTES
[facility]  Department of Emergency Medicine   ED  Encounter Note  Admit Date/RoomTime: 3/28/2021  8:42 AM  ED Room:      NAME: Edward Gottlieb  : 1945  MRN: [id number]     Chief Complaint:  Hand Burn (left dorsal side hand burn occurred this morning from frosting off of a pop tart. )    History of Present Illness        Edward Gottlieb is a 66 y.o. old female who presents to the emergency department by private vehicle, for evaluation of burn(s) located on hand, caused by frosting from a pop tart, while at home which occured 1 hour(s) prior to arrival.  The complaint occurred in an closed space. Since onset the symptoms have been stable and mild-moderate in severity. Prehospital care: Topical Gel/Ointment without relief. She denies any fever, chills, night sweats, nasal congestion, rhinorrhea, sore throat, facial pain, swollen glands, cough, chest pain, hemoptysis, dyspnea or wheezing. Tetanus Status:  up to date. Associated Symptoms:    []   Painful     []   Painless     []   Pruritic     []   Red     []   Blister Formation     []   Charring      ROS   Pertinent positives and negatives are stated within HPI, all other systems reviewed and are negative. Past Medical History:  has a past medical history of Acute renal failure syndrome ([address] Chest pain, Chronic pain syndrome, Colon polyps, Degenerative joint disease, Diarrhea, GERD (gastroesophageal reflux disease), History of DVT (deep vein thrombosis), History of pulmonary embolism, Hyperlipidemia, Hypertension, Insomnia, Obesity, Paroxysmal atrial fibrillation ([address] and Syncope and collapse. Surgical History:  has a past surgical history that includes joint replacement (Right, ); Colonoscopy (10/07/2016); Appendectomy; Cholecystectomy; and cardiovascular stress test (2020). Social History:  reports that she has never smoked.  She has never used smokeless tobacco. She reports that she does not drink alcohol or use drugs. Family History: family history includes Diabetes type 2  in her brother, mother, and sister; Other in her father; Pancreatic Cancer in her brother and brother. Allergies: Penicillins    Physical Exam   Oxygen Saturation Interpretation: Normal.        ED Triage Vitals [03/28/21 0839]   BP Temp Temp Source Pulse Resp SpO2 Height Weight   (!) 175/87 96.6 °F (35.9 °C) Temporal 85 14 96 % 5' 6\" (1.676 m) 230 lb (104.3 kg)         Constitutional:  Alert, development consistent with age. HEENT:  NC/NT. Airway patent. Neck:  Normal ROM. Supple. Extremity(s):  Left: hand. Tenderness:  moderate. Swelling: None. Deformity: No.               ROM: full range of motion. Skin: Left dorsal part of the hand on knuckles burn noted with sloughing of skin. Oozing of serosanguineous fluid. Patient is full range of motion of fingers. Patient has good radial pulse. Patient has good cap refill. No other injuries noted. Neurovascular: Motor deficit: none. Sensory deficit: none. Pulse deficit: none. Capillary refill: normal.  Lymphatics: No lymphangitis or adenopathy noted. Neurological:  Oriented. Motor functions intact. Lab / Imaging Results   (All laboratory and radiology results have been personally reviewed by myself)  Labs:  No results found for this visit on 03/28/21. Imaging: All Radiology results interpreted by Radiologist unless otherwise noted. No orders to display       ED Course / Medical Decision Making     Medications   bacitracin ointment (has no administration in time range)        Consult(s):   None    Procedure(s): Wound was thoroughly cleaned and dressed with bacitracin. Patient was educated on proper wound care.   She voiced understanding and agreed    MDM:   Patient is a 25-year-old female that presented to the emergency department with a left dorsal fourth and fifth knuckle burn that occurred from frosting from a pop tart. Patient states that she applied some topical burn spray which did not help. Patient states that she came here due to to needing help with it getting cleaned. Patient had a thorough examination has a good radial pulse. Patient has full range of motion of her extremities as well as her fingers. Patient has a good cap refill. Patient denies any tendon injury or any other trauma to the hand. Please see procedure above but patient was thoroughly cleaned and dressed appropriately. Bacitracin will be given for the patient to apply and keep covered during the day and leave it uncovered at night. Patient was explained that she will have burning pain for some time at least a day or 2. Patient was educated to alternate Tylenol every 6-8 hours with food since she cannot take NSAIDs due to being on Eliquis. Patient voiced understanding and agreed with the plan of management. Patient will follow up with her PCP. Patient's tetanus is up-to-date. Patient was explicitly instructed on specific signs and symptoms on which to return to the emergency room for. Patient was instructed to return to the ER for any new or worsening symptoms. Additional discharge instructions were given verbally. All questions were answered. Patient is comfortable and agreeable with discharge plan. Patient in no acute distress and non-toxic in appearance. Plan of Care/Counseling:  I reviewed today's visit with the patient in addition to providing specific details for the plan of care and counseling regarding the diagnosis and prognosis. Questions are answered at this time and are agreeable with the plan. Assessment      1. Burn erythema of back of hand, left, initial encounter      Plan   Discharge home.   Patient condition is stable    New Medications     New Prescriptions    NEOMYCIN-BACITRACIN-POLYMYXIN (NEOSPORIN) [id number] OINTMENT    Apply topically 2 times daily. Electronically signed by Fuad Moody PA-C   DD: 3/28/21  **This report was transcribed using voice recognition software. Every effort was made to ensure accuracy; however, inadvertent computerized transcription errors may be present.   END OF ED PROVIDER NOTE      Fuad Moody PA-C  03/28/21 [id number]

## 2021-03-29 ENCOUNTER — TELEPHONE (OUTPATIENT)
Dept: PRIMARY CARE CLINIC | Age: 76
End: 2021-03-29

## 2021-03-29 DIAGNOSIS — M17.12 PRIMARY OSTEOARTHRITIS OF LEFT KNEE: ICD-10-CM

## 2021-03-29 RX ORDER — ERGOCALCIFEROL 1.25 MG/1
CAPSULE ORAL
Qty: 12 CAPSULE | Refills: 2 | Status: SHIPPED
Start: 2021-03-29 | End: 2021-04-26 | Stop reason: SDUPTHER

## 2021-03-29 RX ORDER — ACETAMINOPHEN AND CODEINE PHOSPHATE 300; 30 MG/1; MG/1
1 TABLET ORAL EVERY 8 HOURS PRN
Qty: 90 TABLET | Refills: 1 | Status: SHIPPED
Start: 2021-03-29 | End: 2021-04-28

## 2021-03-29 NOTE — TELEPHONE ENCOUNTER
I can see her next Tuesday.   Unless the burn is worsening, she doesn't necessarily have to follow up

## 2021-04-07 ENCOUNTER — OFFICE VISIT (OUTPATIENT)
Dept: PRIMARY CARE CLINIC | Age: 76
End: 2021-04-07
Payer: MEDICARE

## 2021-04-07 VITALS
HEART RATE: 64 BPM | OXYGEN SATURATION: 97 % | SYSTOLIC BLOOD PRESSURE: 128 MMHG | DIASTOLIC BLOOD PRESSURE: 80 MMHG | TEMPERATURE: 97.4 F | BODY MASS INDEX: 38.25 KG/M2 | HEIGHT: 66 IN | WEIGHT: 238 LBS

## 2021-04-07 DIAGNOSIS — Z78.0 POSTMENOPAUSAL: ICD-10-CM

## 2021-04-07 DIAGNOSIS — Z00.00 ROUTINE GENERAL MEDICAL EXAMINATION AT A HEALTH CARE FACILITY: Primary | ICD-10-CM

## 2021-04-07 DIAGNOSIS — E55.9 VITAMIN D INSUFFICIENCY: ICD-10-CM

## 2021-04-07 DIAGNOSIS — Z12.31 ENCOUNTER FOR SCREENING MAMMOGRAM FOR MALIGNANT NEOPLASM OF BREAST: ICD-10-CM

## 2021-04-07 DIAGNOSIS — I10 ESSENTIAL HYPERTENSION: ICD-10-CM

## 2021-04-07 DIAGNOSIS — R73.01 IMPAIRED FASTING BLOOD SUGAR: ICD-10-CM

## 2021-04-07 PROBLEM — R07.9 CHEST PAIN: Status: RESOLVED | Noted: 2020-01-15 | Resolved: 2021-04-07

## 2021-04-07 PROCEDURE — G0439 PPPS, SUBSEQ VISIT: HCPCS | Performed by: FAMILY MEDICINE

## 2021-04-07 ASSESSMENT — PATIENT HEALTH QUESTIONNAIRE - PHQ9
SUM OF ALL RESPONSES TO PHQ9 QUESTIONS 1 & 2: 0
SUM OF ALL RESPONSES TO PHQ QUESTIONS 1-9: 0

## 2021-04-07 ASSESSMENT — LIFESTYLE VARIABLES
HOW OFTEN DO YOU HAVE A DRINK CONTAINING ALCOHOL: NEVER
AUDIT TOTAL SCORE: INCOMPLETE
AUDIT-C TOTAL SCORE: INCOMPLETE

## 2021-04-07 NOTE — PROGRESS NOTES
 Degenerative joint disease     Involving multiple joints    Diarrhea     2ry C-Diff 10/1/2016    GERD (gastroesophageal reflux disease)     History of DVT (deep vein thrombosis)     History of pulmonary embolism     following knee surgery    Hyperlipidemia     Hypertension     Insomnia     denies as of 1/15/20    Obesity     Paroxysmal atrial fibrillation (HCC)     Syncope and collapse        Past Surgical History:   Procedure Laterality Date    APPENDECTOMY      CARDIOVASCULAR STRESS TEST  01/16/2020    Normal    CHOLECYSTECTOMY      COLONOSCOPY  10/07/2016    JOINT REPLACEMENT Right 2012    TKA         Family History   Problem Relation Age of Onset    Pancreatic Cancer Brother     Diabetes type 2  Brother     Diabetes type 2  Mother     Other Father         AAA    Diabetes type 2  Sister     Pancreatic Cancer Brother        CareTeam (Including outside providers/suppliers regularly involved in providing care):   Patient Care Team:  Bere Quiroz DO as PCP - General (Family Medicine)  Bere Quiroz DO as PCP - [facility] Empaneled Provider    Wt Readings from Last 3 Encounters:   04/07/21 238 lb (108 kg)   03/28/21 230 lb (104.3 kg)   10/07/20 234 lb (106.1 kg)     Vitals:    04/07/21 0904   BP: 128/80   Pulse: 64   Temp: 97.4 °F (36.3 °C)   SpO2: 97%   Weight: 238 lb (108 kg)   Height: 5' 6\" (1.676 m)     Body mass index is 38.41 kg/m². Based upon direct observation of the patient, evaluation of cognition reveals recent and remote memory intact. Physical Exam  Vitals signs and nursing note reviewed. Constitutional:       General: She is not in acute distress. Appearance: Normal appearance. She is well-developed. She is obese. She is not ill-appearing. HENT:      Head: Normocephalic and atraumatic. Right Ear: External ear normal.      Left Ear: External ear normal.      Nose:      Comments: Wearing mask  Eyes:      General: No scleral icterus.      Conjunctiva/sclera: Conjunctivae normal.   Neck:      Musculoskeletal: Normal range of motion and neck supple. No muscular tenderness. Thyroid: No thyromegaly. Cardiovascular:      Rate and Rhythm: Normal rate. Rhythm irregular. Heart sounds: Normal heart sounds. No murmur. Pulmonary:      Effort: Pulmonary effort is normal. No respiratory distress. Breath sounds: Normal breath sounds. No wheezing. Musculoskeletal: Normal range of motion. General: No tenderness or deformity. Right lower leg: Edema (1+ pitting) present. Left lower leg: Edema (1+ pitting) present. Lymphadenopathy:      Cervical: No cervical adenopathy. Skin:     General: Skin is warm and dry. Findings: No rash. Neurological:      General: No focal deficit present. Mental Status: She is alert and oriented to person, place, and time. Gait: Gait normal.   Psychiatric:         Mood and Affect: Mood normal.         Behavior: Behavior normal.         Thought Content: Thought content normal.         Patient's complete Health Risk Assessment and screening values have been reviewed and are found in Flowsheets. The following problems were reviewed today and where indicated follow up appointments were made and/or referrals ordered. Positive Risk Factor Screenings with Interventions:            General Health and ACP:  General  In general, how would you say your health is?: Fair  In the past 7 days, have you experienced any of the following? New or Increased Pain, New or Increased Fatigue, Loneliness, Social Isolation, Stress or Anger?: None of These  Do you get the social and emotional support that you need?: Yes  Do you have a Living Will?: Yes  Advance Directives     Power of [address] Will ACP-Advance Directive ACP-Power of     Not on File Not on File Not on File Not on File      General Health Risk Interventions:  · Patient has Living Will and POA.   Discussed HM    Health Habits/Nutrition:  Health Habits/Nutrition  Do you exercise for at least 20 minutes 2-3 times per week?: Yes  Have you lost any weight without trying in the past 3 months?: No  Do you eat only one meal per day?: No  Have you seen the dentist within the past year?: (!) No  Body mass index: (!) 38.41  Health Habits/Nutrition Interventions:  · Dental exam overdue:  patient encouraged to make appointment with his/her dentist    Hearing/Vision:  No exam data present  Hearing/Vision  Do you or your family notice any trouble with your hearing that hasn't been managed with hearing aids?: No  Do you have difficulty driving, watching TV, or doing any of your daily activities because of your eyesight?: (!) Yes  Have you had an eye exam within the past year?: (!) No  Hearing/Vision Interventions:  · Vision concerns:  patient encouraged to make appointment with his/her eye specialist    Safety:  Safety  Do you have working smoke detectors?: Yes  Have all throw rugs been removed or fastened?: (!) No  Do you have non-slip mats or surfaces in all bathtubs/showers?: Yes  Do all of your stairways have a railing or banister?: Yes  Are your doorways, halls and stairs free of clutter?: Yes  Do you always fasten your seatbelt when you are in a car?: Yes  Safety Interventions:  · Home safety tips provided          Personalized Preventive Plan   Current Health Maintenance Status  Immunization History   Administered Date(s) Administered    COVID-19, Campbell Peter, PF, 30mcg/0.3mL 02/03/2021, 02/24/2021    Influenza Vaccine, unspecified formulation 10/01/2010, 09/13/2011, 10/08/2012, 10/08/2013, 10/07/2015, 09/11/2017    Influenza Virus Vaccine 10/01/2010, 09/13/2011, 10/08/2012, 10/08/2013, 10/07/2015, 09/11/2017    Influenza, High Dose (Fluzone 65 yrs and older) 08/31/2019    Influenza, Quadv, adjuvanted, 65 yrs +, IM, PF (Fluad) 09/01/2020    Influenza, Triv, inactivated, subunit, adjuvanted, IM (Fluad 65 yrs and older) 08/31/2018, 09/18/2019    Pneumococcal Polysaccharide ([id number]) 10/08/2012    Zoster Recombinant (Shingrix) 10/26/2020, 03/22/2021        Health Maintenance   Topic Date Due    DEXA (modify frequency per FRAX score)  Never done    Annual Wellness Visit (AWV)  Never done    DTaP/Tdap/Td vaccine (1 - Tdap) 10/07/2021 (Originally 10/6/1964)    Flu vaccine  Completed    Shingles Vaccine  Completed    Pneumococcal 65+ years Vaccine  Completed    COVID-19 Vaccine  Completed    Hepatitis C screen  Completed    Hepatitis A vaccine  Aged Out    Hepatitis B vaccine  Aged Out    Hib vaccine  Aged Out    Meningococcal (ACWY) vaccine  Aged Out     Recommendations for [employer] Due: see orders and patient instructions/AVS.    Recommended screening schedule for the next 5-10 years is provided to the patient in written form: see Patient Instructions/AVS.    Assessment and Plan  Fransisco Tuttle was seen today for medicare awv. Diagnoses and all orders for this visit:    Routine general medical examination at a health care facility  HRA reviewed and addressed. Due for mammo and DEXA but otherwise UTD on HM. Due for FBW. Essential hypertension  -     CBC Auto Differential; Future  -     Comprehensive Metabolic Panel; Future  -     Lipid Panel; Future  -     TSH without Reflex; Future  -     Urinalysis; Future  -     Uric Acid; Future  Well controlled. Impaired fasting blood sugar  -     Hemoglobin A1C; Future    Vitamin D insufficiency  -     Vitamin D 25 Hydroxy; Future    Postmenopausal  -     DEXA BONE DENSITY AXIAL SKELETON; Future    Encounter for screening mammogram for malignant neoplasm of breast  -     [facility] KELECHI DIGITAL SCREEN BILATERAL; Future        Return in about 6 months (around 10/7/2021) for Recheck.       Seen By:  Kirti Martinez DO

## 2021-04-07 NOTE — PATIENT INSTRUCTIONS
Personalized Preventive Plan for Rommel Butterfield - 4/7/2021  Medicare offers a range of preventive health benefits. Some of the tests and screenings are paid in full while other may be subject to a deductible, co-insurance, and/or copay. Some of these benefits include a comprehensive review of your medical history including lifestyle, illnesses that may run in your family, and various assessments and screenings as appropriate. After reviewing your medical record and screening and assessments performed today your provider may have ordered immunizations, labs, imaging, and/or referrals for you. A list of these orders (if applicable) as well as your Preventive Care list are included within your After Visit Summary for your review. Other Preventive Recommendations:    · A preventive eye exam performed by an eye specialist is recommended every 1-2 years to screen for glaucoma; cataracts, macular degeneration, and other eye disorders. · A preventive dental visit is recommended every 6 months. · Try to get at least 150 minutes of exercise per week or 10,000 steps per day on a pedometer . · Order or download the FREE \"Exercise & Physical Activity: Your Everyday Guide\" from [employer]. Call 6-718.998.3526 or search [employer] online. · You need 4775-8251 mg of calcium and 2425-8191 IU of vitamin D per day. It is possible to meet your calcium requirement with diet alone, but a vitamin D supplement is usually necessary to meet this goal.  · When exposed to the sun, use a sunscreen that protects against both UVA and UVB radiation with an SPF of 30 or greater. Reapply every 2 to 3 hours or after sweating, drying off with a towel, or swimming. · Always wear a seat belt when traveling in a car. Always wear a helmet when riding a bicycle or motorcycle.

## 2021-04-16 ENCOUNTER — TELEPHONE (OUTPATIENT)
Dept: PRIMARY CARE CLINIC | Age: 76
End: 2021-04-16

## 2021-04-16 DIAGNOSIS — Z78.0 POSTMENOPAUSAL: ICD-10-CM

## 2021-04-16 RX ORDER — ALENDRONATE SODIUM 70 MG/1
70 TABLET ORAL
Qty: 12 TABLET | Refills: 1 | Status: SHIPPED
Start: 2021-04-16 | End: 2021-07-13

## 2021-04-20 DIAGNOSIS — E55.9 VITAMIN D INSUFFICIENCY: ICD-10-CM

## 2021-04-20 DIAGNOSIS — I10 ESSENTIAL HYPERTENSION: ICD-10-CM

## 2021-04-20 DIAGNOSIS — R73.01 IMPAIRED FASTING BLOOD SUGAR: ICD-10-CM

## 2021-04-20 LAB
ALBUMIN SERPL-MCNC: 4.1 G/DL (ref 3.5–5.2)
ALP BLD-CCNC: 97 U/L (ref 35–104)
ALT SERPL-CCNC: 15 U/L (ref 0–32)
ANION GAP SERPL CALCULATED.3IONS-SCNC: 15 MMOL/L (ref 7–16)
AST SERPL-CCNC: 15 U/L (ref 0–31)
BACTERIA: ABNORMAL /HPF
BASOPHILS ABSOLUTE: 0.06 E9/L (ref 0–0.2)
BASOPHILS RELATIVE PERCENT: 0.7 % (ref 0–2)
BILIRUB SERPL-MCNC: 0.6 MG/DL (ref 0–1.2)
BILIRUBIN URINE: ABNORMAL
BLOOD, URINE: ABNORMAL
BUN BLDV-MCNC: 21 MG/DL (ref 8–23)
CALCIUM SERPL-MCNC: 9.8 MG/DL (ref 8.6–10.2)
CHLORIDE BLD-SCNC: 105 MMOL/L (ref 98–107)
CHOLESTEROL, TOTAL: 152 MG/DL (ref 0–199)
CLARITY: ABNORMAL
CO2: 24 MMOL/L (ref 22–29)
COLOR: YELLOW
CREAT SERPL-MCNC: 1.2 MG/DL (ref 0.5–1)
CRYSTALS, UA: ABNORMAL /HPF
EOSINOPHILS ABSOLUTE: 0.05 E9/L (ref 0.05–0.5)
EOSINOPHILS RELATIVE PERCENT: 0.5 % (ref 0–6)
EPITHELIAL CELLS, UA: ABNORMAL /HPF
GFR AFRICAN AMERICAN: 53
GFR NON-AFRICAN AMERICAN: 44 ML/MIN/1.73
GLUCOSE BLD-MCNC: 100 MG/DL (ref 74–99)
GLUCOSE URINE: NEGATIVE MG/DL
HBA1C MFR BLD: 6 % (ref 4–5.6)
HCT VFR BLD CALC: 45.7 % (ref 34–48)
HDLC SERPL-MCNC: 34 MG/DL
HEMOGLOBIN: 14.9 G/DL (ref 11.5–15.5)
IMMATURE GRANULOCYTES #: 0.04 E9/L
IMMATURE GRANULOCYTES %: 0.4 % (ref 0–5)
KETONES, URINE: ABNORMAL MG/DL
LDL CHOLESTEROL CALCULATED: 95 MG/DL (ref 0–99)
LEUKOCYTE ESTERASE, URINE: NEGATIVE
LYMPHOCYTES ABSOLUTE: 1.61 E9/L (ref 1.5–4)
LYMPHOCYTES RELATIVE PERCENT: 17.7 % (ref 20–42)
MCH RBC QN AUTO: 32 PG (ref 26–35)
MCHC RBC AUTO-ENTMCNC: 32.6 % (ref 32–34.5)
MCV RBC AUTO: 98.1 FL (ref 80–99.9)
MONOCYTES ABSOLUTE: 0.8 E9/L (ref 0.1–0.95)
MONOCYTES RELATIVE PERCENT: 8.8 % (ref 2–12)
NEUTROPHILS ABSOLUTE: 6.56 E9/L (ref 1.8–7.3)
NEUTROPHILS RELATIVE PERCENT: 71.9 % (ref 43–80)
NITRITE, URINE: NEGATIVE
PDW BLD-RTO: 13.3 FL (ref 11.5–15)
PH UA: 6.5 (ref 5–9)
PLATELET # BLD: 273 E9/L (ref 130–450)
PMV BLD AUTO: 11.4 FL (ref 7–12)
POTASSIUM SERPL-SCNC: 4.1 MMOL/L (ref 3.5–5)
PROTEIN UA: 30 MG/DL
RBC # BLD: 4.66 E12/L (ref 3.5–5.5)
RBC UA: ABNORMAL /HPF (ref 0–2)
SODIUM BLD-SCNC: 144 MMOL/L (ref 132–146)
SPECIFIC GRAVITY UA: >=1.03 (ref 1–1.03)
TOTAL PROTEIN: 7.1 G/DL (ref 6.4–8.3)
TRIGL SERPL-MCNC: 117 MG/DL (ref 0–149)
TSH SERPL DL<=0.05 MIU/L-ACNC: 2.39 UIU/ML (ref 0.27–4.2)
URIC ACID, SERUM: 4.9 MG/DL (ref 2.4–5.7)
UROBILINOGEN, URINE: 1 E.U./DL
VITAMIN D 25-HYDROXY: 17 NG/ML (ref 30–100)
VLDLC SERPL CALC-MCNC: 23 MG/DL
WBC # BLD: 9.1 E9/L (ref 4.5–11.5)
WBC UA: ABNORMAL /HPF (ref 0–5)

## 2021-04-22 DIAGNOSIS — Z12.31 ENCOUNTER FOR SCREENING MAMMOGRAM FOR MALIGNANT NEOPLASM OF BREAST: ICD-10-CM

## 2021-04-26 RX ORDER — ERGOCALCIFEROL 1.25 MG/1
50000 CAPSULE ORAL
Qty: 24 CAPSULE | Refills: 2 | Status: SHIPPED
Start: 2021-04-26 | End: 2021-09-22

## 2021-05-07 DIAGNOSIS — I48.0 PAROXYSMAL ATRIAL FIBRILLATION (HCC): ICD-10-CM

## 2021-06-03 DIAGNOSIS — I48.0 PAROXYSMAL ATRIAL FIBRILLATION (HCC): ICD-10-CM

## 2021-06-03 RX ORDER — APIXABAN 5 MG/1
TABLET, FILM COATED ORAL
Qty: 60 TABLET | Refills: 5 | Status: SHIPPED
Start: 2021-06-03 | End: 2022-01-07

## 2021-07-06 DIAGNOSIS — M17.12 PRIMARY OSTEOARTHRITIS OF LEFT KNEE: Primary | ICD-10-CM

## 2021-07-06 DIAGNOSIS — I48.0 PAROXYSMAL ATRIAL FIBRILLATION (HCC): ICD-10-CM

## 2021-07-06 RX ORDER — DILTIAZEM HYDROCHLORIDE 360 MG/1
360 CAPSULE, EXTENDED RELEASE ORAL DAILY
Qty: 30 CAPSULE | Refills: 5 | Status: SHIPPED
Start: 2021-07-06 | End: 2022-01-13

## 2021-07-06 RX ORDER — ACETAMINOPHEN AND CODEINE PHOSPHATE 300; 30 MG/1; MG/1
1 TABLET ORAL 2 TIMES DAILY PRN
Qty: 60 TABLET | Refills: 0 | Status: SHIPPED
Start: 2021-07-06 | End: 2021-08-19 | Stop reason: SDUPTHER

## 2021-07-13 ENCOUNTER — HOSPITAL ENCOUNTER (EMERGENCY)
Age: 76
Discharge: HOME | End: 2021-07-14
Attending: EMERGENCY MEDICINE
Payer: MEDICARE

## 2021-07-13 ENCOUNTER — APPOINTMENT (OUTPATIENT)
Dept: GENERAL RADIOLOGY | Age: 76
End: 2021-07-13
Payer: MEDICARE

## 2021-07-13 DIAGNOSIS — R07.9 CHEST PAIN, UNSPECIFIED TYPE: Primary | ICD-10-CM

## 2021-07-13 LAB
ALBUMIN SERPL-MCNC: 3.9 G/DL (ref 3.5–5.2)
ALP BLD-CCNC: 103 U/L (ref 35–104)
ALT SERPL-CCNC: 49 U/L (ref 0–32)
ANION GAP SERPL CALCULATED.3IONS-SCNC: 8 MMOL/L (ref 7–16)
AST SERPL-CCNC: 35 U/L (ref 0–31)
BASOPHILS ABSOLUTE: 0.06 E9/L (ref 0–0.2)
BASOPHILS RELATIVE PERCENT: 0.7 % (ref 0–2)
BILIRUB SERPL-MCNC: 0.3 MG/DL (ref 0–1.2)
BUN BLDV-MCNC: 15 MG/DL (ref 6–23)
CALCIUM SERPL-MCNC: 9.5 MG/DL (ref 8.6–10.2)
CHLORIDE BLD-SCNC: 106 MMOL/L (ref 98–107)
CO2: 27 MMOL/L (ref 22–29)
CREAT SERPL-MCNC: 1.1 MG/DL (ref 0.5–1)
EKG ATRIAL RATE: 69 BPM
EKG Q-T INTERVAL: 402 MS
EKG QRS DURATION: 76 MS
EKG QTC CALCULATION (BAZETT): 446 MS
EKG R AXIS: 21 DEGREES
EKG T AXIS: -7 DEGREES
EKG VENTRICULAR RATE: 74 BPM
EOSINOPHILS ABSOLUTE: 0.09 E9/L (ref 0.05–0.5)
EOSINOPHILS RELATIVE PERCENT: 1.1 % (ref 0–6)
GFR AFRICAN AMERICAN: 58
GFR NON-AFRICAN AMERICAN: 48 ML/MIN/1.73
GLUCOSE BLD-MCNC: 135 MG/DL (ref 74–99)
HCT VFR BLD CALC: 43.3 % (ref 34–48)
HEMOGLOBIN: 13.9 G/DL (ref 11.5–15.5)
IMMATURE GRANULOCYTES #: 0.03 E9/L
IMMATURE GRANULOCYTES %: 0.4 % (ref 0–5)
LYMPHOCYTES ABSOLUTE: 1.58 E9/L (ref 1.5–4)
LYMPHOCYTES RELATIVE PERCENT: 18.8 % (ref 20–42)
MCH RBC QN AUTO: 31.3 PG (ref 26–35)
MCHC RBC AUTO-ENTMCNC: 32.1 % (ref 32–34.5)
MCV RBC AUTO: 97.5 FL (ref 80–99.9)
MONOCYTES ABSOLUTE: 0.71 E9/L (ref 0.1–0.95)
MONOCYTES RELATIVE PERCENT: 8.5 % (ref 2–12)
NEUTROPHILS ABSOLUTE: 5.92 E9/L (ref 1.8–7.3)
NEUTROPHILS RELATIVE PERCENT: 70.5 % (ref 43–80)
PDW BLD-RTO: 13 FL (ref 11.5–15)
PLATELET # BLD: 277 E9/L (ref 130–450)
PMV BLD AUTO: 10.4 FL (ref 7–12)
POTASSIUM SERPL-SCNC: 5.1 MMOL/L (ref 3.5–5)
POTASSIUM SERPL-SCNC: 6.2 MMOL/L (ref 3.5–5)
RBC # BLD: 4.44 E12/L (ref 3.5–5.5)
REASON FOR REJECTION: NORMAL
REASON FOR REJECTION: NORMAL
REJECTED TEST: NORMAL
REJECTED TEST: NORMAL
SODIUM BLD-SCNC: 141 MMOL/L (ref 132–146)
TOTAL PROTEIN: 7.1 G/DL (ref 6.4–8.3)
WBC # BLD: 8.4 E9/L (ref 4.5–11.5)

## 2021-07-13 PROCEDURE — 93005 ELECTROCARDIOGRAM TRACING: CPT | Performed by: NURSE PRACTITIONER

## 2021-07-13 PROCEDURE — 36415 COLL VENOUS BLD VENIPUNCTURE: CPT

## 2021-07-13 PROCEDURE — 99284 EMERGENCY DEPT VISIT MOD MDM: CPT

## 2021-07-13 PROCEDURE — 84484 ASSAY OF TROPONIN QUANT: CPT

## 2021-07-13 PROCEDURE — 85025 COMPLETE CBC W/AUTO DIFF WBC: CPT

## 2021-07-13 PROCEDURE — 71045 X-RAY EXAM CHEST 1 VIEW: CPT

## 2021-07-13 PROCEDURE — 93010 ELECTROCARDIOGRAM REPORT: CPT | Performed by: INTERNAL MEDICINE

## 2021-07-13 PROCEDURE — 84132 ASSAY OF SERUM POTASSIUM: CPT

## 2021-07-13 PROCEDURE — 80053 COMPREHEN METABOLIC PANEL: CPT

## 2021-07-13 NOTE — ED NOTES
FIRST PROVIDER CONTACT ASSESSMENT NOTE      Department of Emergency Medicine   7/13/21  6:45 PM EDT    Chief Complaint: Chest Pain (intermit since last night )      History of Present Illness:   Estrada Macdonald is a 66 y.o. female who presents to the ED for    Medical History:  has a past medical history of Acute renal failure syndrome ([facility] [address] Chest pain, Chronic pain syndrome, Colon polyps, Degenerative joint disease, Diarrhea, GERD (gastroesophageal reflux disease), History of DVT (deep vein thrombosis), History of pulmonary embolism, Hyperlipidemia, Hypertension, Insomnia, Obesity, Paroxysmal atrial fibrillation ([facility] [address] and Syncope and collapse. Surgical History:  has a past surgical history that includes joint replacement (Right, 2012); Colonoscopy (10/07/2016); Appendectomy; Cholecystectomy; and cardiovascular stress test (01/16/2020). Social History:  reports that she has never smoked. She has never used smokeless tobacco. She reports that she does not drink alcohol and does not use drugs. Family History: family history includes Diabetes type 2  in her brother, mother, and sister; Other in her father; Pancreatic Cancer in her brother and brother. *ALLERGIES*     Penicillins     Physical Exam:      VS:  There were no vitals taken for this visit.      Initial Plan of Care:  Initiate Treatment-Testing, Proceed toTreatment Area When Bed Available for ED Attending/MLP to Continue Care    -----------------END OF FIRST PROVIDER CONTACT ASSESSMENT NOTE--------------  Electronically signed by KIMBERLY Darby CNP   DD: 7/13/21             KIMBERLY Gamino CNP  07/13/21 [id number]

## 2021-07-14 VITALS
RESPIRATION RATE: 16 BRPM | DIASTOLIC BLOOD PRESSURE: 80 MMHG | BODY MASS INDEX: 36.96 KG/M2 | HEART RATE: 64 BPM | TEMPERATURE: 96.8 F | OXYGEN SATURATION: 99 % | WEIGHT: 230 LBS | SYSTOLIC BLOOD PRESSURE: 145 MMHG | HEIGHT: 66 IN

## 2021-07-14 LAB — TROPONIN, HIGH SENSITIVITY: 10 NG/L (ref 0–9)

## 2021-07-14 PROCEDURE — 2580000003 HC RX 258: Performed by: EMERGENCY MEDICINE

## 2021-07-14 RX ORDER — 0.9 % SODIUM CHLORIDE 0.9 %
1000 INTRAVENOUS SOLUTION INTRAVENOUS ONCE
Status: COMPLETED | OUTPATIENT
Start: 2021-07-14 | End: 2021-07-14

## 2021-07-14 RX ADMIN — SODIUM CHLORIDE 1000 ML: 9 INJECTION, SOLUTION INTRAVENOUS at 01:35

## 2021-07-14 NOTE — ED PROVIDER NOTES
HPI:  7/13/21, Time: 11:30 PM EDT         Shanthi Regalado is a 66 y.o. female presenting to the ED for chest pain intermittently since last night and the left side of her chest, beginning 1 day ago. The complaint has been persistent, moderate in severity, and worsened by nothing. Patient denies fever/chills, sore throat, cough, congestion, chest pain, shortness of breath, edema, headache, visual disturbances, focal paresthesias, focal weakness, abdominal pain, nausea, vomiting, diarrhea, constipation, dysuria, hematuria, trauma, neck or back pain, rash or other complaints. ROS:   A complete review of systems was performed and all pertinent positives and negatives are stated within HPI, all other systems reviewed and are negative.      --------------------------------------------- PAST HISTORY ---------------------------------------------  Past Medical History:  has a past medical history of Acute renal failure syndrome ([facility] [address] Chest pain, Chronic pain syndrome, Colon polyps, Degenerative joint disease, Diarrhea, GERD (gastroesophageal reflux disease), History of DVT (deep vein thrombosis), History of pulmonary embolism, Hyperlipidemia, Hypertension, Insomnia, Obesity, Paroxysmal atrial fibrillation ([facility] [address] and Syncope and collapse. Past Surgical History:  has a past surgical history that includes joint replacement (Right, 2012); Colonoscopy (10/07/2016); Appendectomy; Cholecystectomy; and cardiovascular stress test (01/16/2020). Social History:  reports that she has never smoked. She has never used smokeless tobacco. She reports that she does not drink alcohol and does not use drugs. Family History: family history includes Diabetes type 2  in her brother, mother, and sister; Other in her father; Pancreatic Cancer in her brother and brother. The patients home medications have been reviewed.     Allergies: Penicillins        ----------------------------------------PHYSICAL EXAM--------------------------------------  Constitutional:  Well developed, well nourished, no acute distress, non-toxic appearance   Eyes:  PERRL, conjunctiva normal, EOMI  HENT:  Atraumatic, external ears normal, nose normal, oropharynx moist, no pharyngeal exudates. Neck- normal range of motion, no nuchal rigidity   Respiratory:  No respiratory distress, normal breath sounds, no rales, no wheezing   Cardiovascular:  Normal rate, normal rhythm, no murmurs, no gallops, no rubs. Radial and DP pulses 2+ bilaterally. GI:  Soft, nondistended, normal bowel sounds, nontender, no organomegaly, no mass, no rebound, no guarding   :  No costovertebral angle tenderness   Musculoskeletal:  No edema, no tenderness, no deformities. Back- no tenderness  Integument:  Well hydrated, no rash. Adequate perfusion. Lymphatic:  No cervical lymphadenopathy noted   Neurologic:  Alert & oriented x 3, CN 2-12 normal, normal motor function, normal sensory function, no focal deficits noted. Normal gait. Psychiatric:  Speech and behavior appropriate       -------------------------------------------------- RESULTS -------------------------------------------------  I have personally reviewed all laboratory and imaging results for this patient. Results are listed below.      LABS:  Results for orders placed or performed during the hospital encounter of 07/13/21   CBC Auto Differential   Result Value Ref Range    WBC 8.4 4.5 - 11.5 E9/L    RBC 4.44 3.50 - 5.50 E12/L    Hemoglobin 13.9 11.5 - 15.5 g/dL    Hematocrit 43.3 34.0 - 48.0 %    MCV 97.5 80.0 - 99.9 fL    MCH 31.3 26.0 - 35.0 pg    MCHC 32.1 32.0 - 34.5 %    RDW 13.0 11.5 - 15.0 fL    Platelets 685 083 - 929 E9/L    MPV 10.4 7.0 - 12.0 fL    Neutrophils % 70.5 43.0 - 80.0 %    Immature Granulocytes % 0.4 0.0 - 5.0 %    Lymphocytes % 18.8 (L) 20.0 - 42.0 %    Monocytes % 8.5 2.0 - 12.0 %    Eosinophils % 1.1 0.0 - 6.0 %    Basophils % 0.7 0.0 - 2.0 %    Neutrophils Absolute 5.92 1.80 - 7.30 E9/L    Immature Granulocytes # 0.03 E9/L    Lymphocytes Absolute 1.58 1.50 - 4.00 E9/L    Monocytes Absolute 0.71 0.10 - 0.95 E9/L    Eosinophils Absolute 0.09 0.05 - 0.50 E9/L    Basophils Absolute 0.06 0.00 - 0.20 E9/L   Comprehensive Metabolic Panel   Result Value Ref Range    Sodium 141 132 - 146 mmol/L    Potassium 6.2 (H) 3.5 - 5.0 mmol/L    Chloride 106 98 - 107 mmol/L    CO2 27 22 - 29 mmol/L    Anion Gap 8 7 - 16 mmol/L    Glucose 135 (H) 74 - 99 mg/dL    BUN 15 6 - 23 mg/dL    CREATININE 1.1 (H) 0.5 - 1.0 mg/dL    GFR Non-African American 48 >=60 mL/min/1.73    GFR African American 58     Calcium 9.5 8.6 - 10.2 mg/dL    Total Protein 7.1 6.4 - 8.3 g/dL    Albumin 3.9 3.5 - 5.2 g/dL    Total Bilirubin 0.3 0.0 - 1.2 mg/dL    Alkaline Phosphatase 103 35 - 104 U/L    ALT 49 (H) 0 - 32 U/L    AST 35 (H) 0 - 31 U/L   SPECIMEN REJECTION   Result Value Ref Range    Rejected Test TRP5     Reason for Rejection see below    Potassium   Result Value Ref Range    Potassium 5.1 (H) 3.5 - 5.0 mmol/L   SPECIMEN REJECTION   Result Value Ref Range    Rejected Test trop     Reason for Rejection see below    Troponin   Result Value Ref Range    Troponin, High Sensitivity 10 (H) 0 - 9 ng/L   EKG 12 Lead   Result Value Ref Range    Ventricular Rate 74 BPM    Atrial Rate 69 BPM    QRS Duration 76 ms    Q-T Interval 402 ms    QTc Calculation (Bazett) 446 ms    R Axis 21 degrees    T Axis -7 degrees       RADIOLOGY:  Interpreted by Radiologist.  XR CHEST PORTABLE   Final Result   No acute process. IMPRESSION:   No acute process.                EKG Interpretation  Time: 1845  Rhythm: atrial fibrillation - controlled  Rate: normal  Axis: normal  Conduction: normal  ST Segments: no acute change  T Waves: no acute change  Clinical Impression: no acute changes  Comparison to prior EKG: stable as compared to patient's most recent EKG      ------------------------- NURSING NOTES AND VITALS REVIEWED ---------------------------  The nursing notes within the ED encounter and vital signs as below have been reviewed by myself. BP (!) 145/80   Pulse 64   Temp 96.8 °F (36 °C) (Tympanic)   Resp 16   Ht 5' 6\" (1.676 m)   Wt 230 lb (104.3 kg)   SpO2 99%   BMI 37.12 kg/m²   Oxygen Saturation Interpretation: Normal      The patients available past medical records and past encounters were reviewed. ------------------------------ ED COURSE/MEDICAL DECISION MAKING----------------------  Medications   0.9 % sodium chloride bolus (0 mLs Intravenous Stopped 7/14/21 0221)              Medical Decision Making:    Troponin was within normal limits the patient's previous exam was reassuring. Patient pain was reproducible on palpation of the area of her pain. She continued to be asymptomatic in the emergency department and following shared decision making patient will be discharged with follow-up to her PCP and cardiologist for further evaluation. Patient was explicitly instructed on specific signs and symptoms on which to return to the emergency room for. Patient was instructed to return to the ER for any new or worsening symptoms. Additional discharge instructions were given verbally. All questions were answered. Patient is comfortable and agreeable with discharge plan. Patient in no acute distress and non-toxic in appearance. This patient's ED course included: a personal history and physicial examination, re-evaluation prior to disposition, multiple bedside re-evaluations, IV medications, cardiac monitoring, continuous pulse oximetry, complex medical decision making and emergency management and a personal history and physicial eaxmination    This patient has remained hemodynamically stable and been closely monitored during their ED course. Re-Evaluations:  Time: 2028   Re-evaluation. Patients symptoms show no change  Repeat physical examination is not changed        Counseling:   The emergency provider has spoken with the patient and discussed todays results, in addition to providing specific details for the plan of care and counseling regarding the diagnosis and prognosis. Questions are answered at this time and they are agreeable with the plan.    --------------------------- IMPRESSION AND DISPOSITION ---------------------------------    IMPRESSION  1.  Chest pain, unspecified type        DISPOSITION  Disposition: Discharge to home  Patient condition is stable              Fatemeh Villafuerte DO  Resident  07/14/21 [id number]

## 2021-07-26 ENCOUNTER — OFFICE VISIT (OUTPATIENT)
Dept: PRIMARY CARE CLINIC | Age: 76
End: 2021-07-26
Payer: MEDICARE

## 2021-07-26 VITALS
SYSTOLIC BLOOD PRESSURE: 138 MMHG | DIASTOLIC BLOOD PRESSURE: 88 MMHG | OXYGEN SATURATION: 97 % | HEART RATE: 92 BPM | HEIGHT: 66 IN | TEMPERATURE: 97.4 F | BODY MASS INDEX: 38.57 KG/M2 | WEIGHT: 240 LBS

## 2021-07-26 DIAGNOSIS — G89.4 CHRONIC PAIN SYNDROME: ICD-10-CM

## 2021-07-26 DIAGNOSIS — R60.9 PERIPHERAL EDEMA: ICD-10-CM

## 2021-07-26 DIAGNOSIS — I48.0 PAROXYSMAL ATRIAL FIBRILLATION (HCC): Primary | ICD-10-CM

## 2021-07-26 PROCEDURE — 99214 OFFICE O/P EST MOD 30 MIN: CPT | Performed by: FAMILY MEDICINE

## 2021-07-26 RX ORDER — FUROSEMIDE 20 MG/1
20 TABLET ORAL DAILY
Qty: 30 TABLET | Refills: 5 | Status: ON HOLD
Start: 2021-07-26 | End: 2021-08-18 | Stop reason: HOSPADM

## 2021-07-26 RX ORDER — BACLOFEN 10 MG/1
10 TABLET ORAL 2 TIMES DAILY
Qty: 60 TABLET | Refills: 5 | Status: SHIPPED | OUTPATIENT
Start: 2021-07-26 | End: 2021-08-25

## 2021-07-26 ASSESSMENT — ENCOUNTER SYMPTOMS
COUGH: 0
WHEEZING: 0
VOMITING: 0
CONSTIPATION: 0
NAUSEA: 0
ABDOMINAL PAIN: 0
DIARRHEA: 0
BACK PAIN: 1
SHORTNESS OF BREATH: 0

## 2021-07-26 NOTE — PROGRESS NOTES
21  Armando Gan : 1945 Sex: female  Age: 66 y.o. Chief Complaint   Patient presents with    Leg Swelling     left leg    Back Pain    Neck Pain     HPI:  66 y.o. female presents today for 3 month(s) follow up of chronic medical conditions, medication refills and FBW. Patient's chart, medical, surgical and medication history all reviewed. Atrial Fibrillation  Patient has atrial fibrillation. Previously seen by Dr. Elvie Velásquez. Rate controlled on Metoprolol and Diltiazem. Current rhythm: irregular. Anticoagulated on Eliquis   Known history of atrial fibrillation: yes  Valvular disease: No  Associated symptoms: none  Previous cardioversion and/or ablation: no  History of CAD: No  History of sleep apnea: No  History of ETOH abuse/drug abuse: No  History of thyroid disease: No    Patient was seen in the ER on 21 due to chest pains. Work up was negative, but she has been having worsening leg swelling recently. Knee Pain  Patient presents with knee pain involving bilateral knees. Onset of the symptoms was several years ago. Inciting event: this is a longstanding problem which has been getting worse.      Current symptoms include crepitus sensation, pain located medially, stiffness and swelling. Pain is aggravated by any weight bearing, going up and down stairs, rising after sitting, squatting and walking. Patient has had prior knee problems.      Evaluation to date: plain films: OA and Ortho consult: patient sees Dr. Norma Barajas. Has been told that she needs to have the L knee replaced, but hasn't gone through with it yet     Treatment to date: avoidance of offending activity, corticosteroid injection which was effective and rest.    She has now been having back pain and neck pain as well. Feels like it is due to walking difficulty from knees. ROS:  Review of Systems   Constitutional: Negative for chills, fatigue and fever.    Respiratory: Negative for cough, shortness of breath and wheezing. Cardiovascular: Positive for leg swelling. Negative for chest pain and palpitations. Gastrointestinal: Negative for abdominal pain, constipation, diarrhea, nausea and vomiting. Musculoskeletal: Positive for arthralgias, back pain, neck pain and neck stiffness. Skin: Negative for rash. Neurological: Negative for dizziness and headaches. Psychiatric/Behavioral: Negative for dysphoric mood. The patient is not nervous/anxious. All other systems reviewed and are negative. Current Outpatient Medications:     baclofen (LIORESAL) 10 MG tablet, Take 1 tablet by mouth 2 times daily, Disp: 60 tablet, Rfl: 5    furosemide (LASIX) 20 MG tablet, Take 1 tablet by mouth daily, Disp: 30 tablet, Rfl: 5    dilTIAZem (TIAZAC) 360 MG extended release capsule, Take 1 capsule by mouth daily, Disp: 30 capsule, Rfl: 5    acetaminophen-codeine (TYLENOL/CODEINE #3) 300-30 MG per tablet, Take 1 tablet by mouth 2 times daily as needed for Pain for up to 30 days. , Disp: 60 tablet, Rfl: 0    ELIQUIS 5 MG TABS tablet, TAKE ONE TABLET BY MOUTH 2 TIMES A DAY, Disp: 60 tablet, Rfl: 5    metoprolol tartrate (LOPRESSOR) 25 MG tablet, Take 1 tablet by mouth 2 times daily, Disp: 180 tablet, Rfl: 1    vitamin D (ERGOCALCIFEROL) 1.25 MG (25488 UT) CAPS capsule, Take 1 capsule by mouth Twice a Week, Disp: 24 capsule, Rfl: 2    calcium carbonate (TUMS) 500 MG chewable tablet, Take 2 tablets by mouth 2 times daily as needed for Heartburn OTC, Disp: , Rfl:     Allergies   Allergen Reactions    Penicillins Hives       Past Medical History:   Diagnosis Date    Acute renal failure syndrome (HCC) 10/1/16  10/07/2016    2ry severe dehydration     Chest pain 01/15/2020    R/O ACS- normal stress    Chronic pain syndrome     Colon polyps     Microscopic colitis    Degenerative joint disease     Involving multiple joints    Diarrhea     2ry C-Diff 10/1/2016    GERD (gastroesophageal reflux disease)     History of DVT (deep vein thrombosis)     History of pulmonary embolism     following knee surgery    Hyperlipidemia     Hypertension     Insomnia     denies as of 1/15/20    Obesity     Paroxysmal atrial fibrillation (HCC)     Syncope and collapse      Past Surgical History:   Procedure Laterality Date    APPENDECTOMY      CARDIOVASCULAR STRESS TEST  01/16/2020    Normal    CHOLECYSTECTOMY      COLONOSCOPY  10/07/2016    JOINT REPLACEMENT Right 2012    TKA     Family History   Problem Relation Age of Onset    Pancreatic Cancer Brother     Diabetes type 2  Brother     Diabetes type 2  Mother     Other Father         AAA    Diabetes type 2  Sister     Pancreatic Cancer Brother      Social History     Socioeconomic History    Marital status: Single     Spouse name: Not on file    Number of children: Not on file    Years of education: Not on file    Highest education level: Not on file   Occupational History    Not on file   Tobacco Use    Smoking status: Never Smoker    Smokeless tobacco: Never Used   Vaping Use    Vaping Use: Never used   Substance and Sexual Activity    Alcohol use: No    Drug use: No    Sexual activity: Not on file   Other Topics Concern    Not on file   Social History Narrative    Not on file     Social Determinants of Health     Financial Resource Strain:     Difficulty of Paying Living Expenses:    Food Insecurity:     Worried About Running Out of Food in the Last Year:     Ran Out of Food in the Last Year:    Transportation Needs:     Lack of Transportation (Medical):      Lack of Transportation (Non-Medical):    Physical Activity:     Days of Exercise per Week:     Minutes of Exercise per Session:    Stress:     Feeling of Stress :    Social Connections:     Frequency of Communication with Friends and Family:     Frequency of Social Gatherings with Friends and Family:     Attends Religion Services:     Active Member of Clubs or Organizations:     Attends Club or Organization Meetings:     Marital Status:    Intimate Partner Violence:     Fear of Current or Ex-Partner:     Emotionally Abused:     Physically Abused:     Sexually Abused:        Vitals:    07/26/21 1352   BP: 138/88   Pulse: 92   Temp: 97.4 °F (36.3 °C)   SpO2: 97%   Weight: 240 lb (108.9 kg)   Height: 5' 6\" (1.676 m)       Physical Exam:  Physical Exam  Vitals and nursing note reviewed. Constitutional:       General: She is not in acute distress. Appearance: Normal appearance. She is well-developed. She is obese. She is not ill-appearing. HENT:      Head: Normocephalic and atraumatic. Right Ear: Hearing and external ear normal.      Left Ear: Hearing and external ear normal.      Nose:      Comments: Wearing mask  Eyes:      General: Lids are normal. No scleral icterus. Extraocular Movements: Extraocular movements intact. Conjunctiva/sclera: Conjunctivae normal.   Neck:      Thyroid: No thyromegaly. Cardiovascular:      Rate and Rhythm: Normal rate. Rhythm irregular. Heart sounds: Normal heart sounds. No murmur heard. Pulmonary:      Effort: Pulmonary effort is normal. No respiratory distress. Breath sounds: Normal breath sounds. No wheezing. Musculoskeletal:         General: No tenderness or deformity. Normal range of motion. Cervical back: Normal range of motion and neck supple. No muscular tenderness. Right lower leg: Edema (2+ pitting to mid-shin) present. Left lower leg: Edema (2+ pitting to mid-shin) present. Lymphadenopathy:      Cervical: No cervical adenopathy. Skin:     General: Skin is warm and dry. Findings: No rash. Neurological:      General: No focal deficit present. Mental Status: She is alert and oriented to person, place, and time. Gait: Gait normal.   Psychiatric:         Mood and Affect: Mood and affect normal.         Speech: Speech normal.         Behavior: Behavior normal.         Thought Content:  Thought content normal.         Labs:  CBC with Differential:    Lab Results   Component Value Date    WBC 8.4 07/13/2021    RBC 4.44 07/13/2021    HGB 13.9 07/13/2021    HCT 43.3 07/13/2021     07/13/2021    MCV 97.5 07/13/2021    MCH 31.3 07/13/2021    MCHC 32.1 07/13/2021    RDW 13.0 07/13/2021    BANDSPCT 4 10/01/2016    LYMPHOPCT 18.8 07/13/2021    MONOPCT 8.5 07/13/2021    BASOPCT 0.7 07/13/2021    MONOSABS 0.71 07/13/2021    LYMPHSABS 1.58 07/13/2021    EOSABS 0.09 07/13/2021    BASOSABS 0.06 07/13/2021     CMP:    Lab Results   Component Value Date     07/13/2021    K 5.1 07/13/2021    K 4.0 01/16/2020     07/13/2021    CO2 27 07/13/2021    BUN 15 07/13/2021    CREATININE 1.1 07/13/2021    GFRAA 58 07/13/2021    LABGLOM 48 07/13/2021    GLUCOSE 135 07/13/2021    PROT 7.1 07/13/2021    LABALBU 3.9 07/13/2021    CALCIUM 9.5 07/13/2021    BILITOT 0.3 07/13/2021    ALKPHOS 103 07/13/2021    AST 35 07/13/2021    ALT 49 07/13/2021     HgBA1c:    Lab Results   Component Value Date    LABA1C 6.0 04/20/2021     FLP:    Lab Results   Component Value Date    TRIG 117 04/20/2021    HDL 34 04/20/2021    LDLCALC 95 04/20/2021    LABVLDL 23 04/20/2021     TSH:    Lab Results   Component Value Date    TSH 2.390 04/20/2021        Assessment and Plan:  Rose Ortega was seen today for leg swelling, back pain and neck pain. Diagnoses and all orders for this visit:    Paroxysmal atrial fibrillation ([address]  -     ECHO Complete 2D W Doppler W Color; Future  -     Carmencita Carter MD, Cardiology, [facility]  Persistent at this time. Recent EKG demonstrates A.fib. Needs Echo to determine if swelling is due to poor pumping/filling function. Will also get her back in to see cardiology for possible alternate treatments. Peripheral edema  -     ECHO Complete 2D W Doppler W Color; Future  -     furosemide (LASIX) 20 MG tablet;  Take 1 tablet by mouth daily  PRN lasix    Chronic pain syndrome  -     baclofen (LIORESAL) 10 MG tablet; Take 1 tablet by mouth 2 times daily  Will try adding muscle relaxer for stiffness        Return in about 3 months (around 10/26/2021) for Recheck.       Seen By:  Benji Daly DO

## 2021-07-27 ENCOUNTER — TELEPHONE (OUTPATIENT)
Dept: ADMINISTRATIVE | Age: 76
End: 2021-07-27

## 2021-07-27 NOTE — TELEPHONE ENCOUNTER
Referral in the [facility] on Dr. Cruz's pt from hospital consult back in Jan 2020 per Dr. Axel Valdes dx: Afib. Never came in for her HFU apt in Jan 2020. Ok to schedule an OV at the end of Aug with Dr. Jacqueline Gustafson?

## 2021-08-14 ENCOUNTER — HOSPITAL ENCOUNTER (EMERGENCY)
Age: 76
Discharge: HOME | End: 2021-08-14
Attending: EMERGENCY MEDICINE
Payer: MEDICARE

## 2021-08-14 ENCOUNTER — APPOINTMENT (OUTPATIENT)
Dept: GENERAL RADIOLOGY | Age: 76
End: 2021-08-14
Payer: MEDICARE

## 2021-08-14 ENCOUNTER — NURSE TRIAGE (OUTPATIENT)
Dept: OTHER | Facility: CLINIC | Age: 76
End: 2021-08-14

## 2021-08-14 VITALS
WEIGHT: 235 LBS | HEIGHT: 66 IN | TEMPERATURE: 98.1 F | HEART RATE: 79 BPM | DIASTOLIC BLOOD PRESSURE: 69 MMHG | BODY MASS INDEX: 37.77 KG/M2 | SYSTOLIC BLOOD PRESSURE: 148 MMHG | OXYGEN SATURATION: 93 % | RESPIRATION RATE: 16 BRPM

## 2021-08-14 DIAGNOSIS — R60.0 BILATERAL LOWER EXTREMITY EDEMA: Primary | ICD-10-CM

## 2021-08-14 DIAGNOSIS — M17.0 OSTEOARTHRITIS OF BOTH KNEES, UNSPECIFIED OSTEOARTHRITIS TYPE: ICD-10-CM

## 2021-08-14 LAB
ALBUMIN SERPL-MCNC: 3.6 G/DL (ref 3.5–5.2)
ALP BLD-CCNC: 82 U/L (ref 35–104)
ALT SERPL-CCNC: 10 U/L (ref 0–32)
ANION GAP SERPL CALCULATED.3IONS-SCNC: 10 MMOL/L (ref 7–16)
AST SERPL-CCNC: 21 U/L (ref 0–31)
BASOPHILS ABSOLUTE: 0.07 E9/L (ref 0–0.2)
BASOPHILS RELATIVE PERCENT: 1 % (ref 0–2)
BILIRUB SERPL-MCNC: 0.4 MG/DL (ref 0–1.2)
BUN BLDV-MCNC: 18 MG/DL (ref 6–23)
CALCIUM SERPL-MCNC: 8.8 MG/DL (ref 8.6–10.2)
CHLORIDE BLD-SCNC: 106 MMOL/L (ref 98–107)
CO2: 24 MMOL/L (ref 22–29)
CREAT SERPL-MCNC: 1.1 MG/DL (ref 0.5–1)
EKG ATRIAL RATE: 64 BPM
EKG Q-T INTERVAL: 422 MS
EKG QRS DURATION: 86 MS
EKG QTC CALCULATION (BAZETT): 435 MS
EKG R AXIS: 26 DEGREES
EKG T AXIS: -5 DEGREES
EKG VENTRICULAR RATE: 64 BPM
EOSINOPHILS ABSOLUTE: 0.09 E9/L (ref 0.05–0.5)
EOSINOPHILS RELATIVE PERCENT: 1.2 % (ref 0–6)
GFR AFRICAN AMERICAN: 58
GFR NON-AFRICAN AMERICAN: 48 ML/MIN/1.73
GLUCOSE BLD-MCNC: 149 MG/DL (ref 74–99)
HCT VFR BLD CALC: 41.6 % (ref 34–48)
HEMOGLOBIN: 13.6 G/DL (ref 11.5–15.5)
IMMATURE GRANULOCYTES #: 0.01 E9/L
IMMATURE GRANULOCYTES %: 0.1 % (ref 0–5)
LYMPHOCYTES ABSOLUTE: 1.41 E9/L (ref 1.5–4)
LYMPHOCYTES RELATIVE PERCENT: 19.3 % (ref 20–42)
MCH RBC QN AUTO: 31.7 PG (ref 26–35)
MCHC RBC AUTO-ENTMCNC: 32.7 % (ref 32–34.5)
MCV RBC AUTO: 97 FL (ref 80–99.9)
MONOCYTES ABSOLUTE: 0.66 E9/L (ref 0.1–0.95)
MONOCYTES RELATIVE PERCENT: 9 % (ref 2–12)
NEUTROPHILS ABSOLUTE: 5.08 E9/L (ref 1.8–7.3)
NEUTROPHILS RELATIVE PERCENT: 69.4 % (ref 43–80)
PDW BLD-RTO: 13.2 FL (ref 11.5–15)
PLATELET # BLD: 273 E9/L (ref 130–450)
PMV BLD AUTO: 10.5 FL (ref 7–12)
POTASSIUM SERPL-SCNC: 3.8 MMOL/L (ref 3.5–5)
POTASSIUM SERPL-SCNC: 5 MMOL/L (ref 3.5–5)
PRO-BNP: 951 PG/ML (ref 0–450)
RBC # BLD: 4.29 E12/L (ref 3.5–5.5)
SODIUM BLD-SCNC: 140 MMOL/L (ref 132–146)
TOTAL PROTEIN: 6.9 G/DL (ref 6.4–8.3)
TROPONIN, HIGH SENSITIVITY: 13 NG/L (ref 0–9)
TROPONIN, HIGH SENSITIVITY: 17 NG/L (ref 0–9)
WBC # BLD: 7.3 E9/L (ref 4.5–11.5)

## 2021-08-14 PROCEDURE — 93005 ELECTROCARDIOGRAM TRACING: CPT | Performed by: PHYSICIAN ASSISTANT

## 2021-08-14 PROCEDURE — 71046 X-RAY EXAM CHEST 2 VIEWS: CPT

## 2021-08-14 PROCEDURE — 36415 COLL VENOUS BLD VENIPUNCTURE: CPT

## 2021-08-14 PROCEDURE — 93010 ELECTROCARDIOGRAM REPORT: CPT | Performed by: INTERNAL MEDICINE

## 2021-08-14 PROCEDURE — 85025 COMPLETE CBC W/AUTO DIFF WBC: CPT

## 2021-08-14 PROCEDURE — 99282 EMERGENCY DEPT VISIT SF MDM: CPT

## 2021-08-14 PROCEDURE — 84484 ASSAY OF TROPONIN QUANT: CPT

## 2021-08-14 PROCEDURE — 96374 THER/PROPH/DIAG INJ IV PUSH: CPT

## 2021-08-14 PROCEDURE — 84132 ASSAY OF SERUM POTASSIUM: CPT

## 2021-08-14 PROCEDURE — 6360000002 HC RX W HCPCS: Performed by: EMERGENCY MEDICINE

## 2021-08-14 PROCEDURE — 80053 COMPREHEN METABOLIC PANEL: CPT

## 2021-08-14 PROCEDURE — 83880 ASSAY OF NATRIURETIC PEPTIDE: CPT

## 2021-08-14 RX ORDER — FUROSEMIDE 10 MG/ML
40 INJECTION INTRAMUSCULAR; INTRAVENOUS ONCE
Status: COMPLETED | OUTPATIENT
Start: 2021-08-14 | End: 2021-08-14

## 2021-08-14 RX ORDER — ACETAMINOPHEN AND CODEINE PHOSPHATE 300; 30 MG/1; MG/1
1 TABLET ORAL ONCE
Status: DISCONTINUED | OUTPATIENT
Start: 2021-08-14 | End: 2021-08-14 | Stop reason: CLARIF

## 2021-08-14 RX ORDER — HYDROCODONE BITARTRATE AND ACETAMINOPHEN 5; 325 MG/1; MG/1
1 TABLET ORAL ONCE
Status: DISCONTINUED | OUTPATIENT
Start: 2021-08-14 | End: 2021-08-14 | Stop reason: HOSPADM

## 2021-08-14 RX ADMIN — FUROSEMIDE 40 MG: 10 INJECTION, SOLUTION INTRAMUSCULAR; INTRAVENOUS at 15:24

## 2021-08-14 ASSESSMENT — ENCOUNTER SYMPTOMS
SORE THROAT: 0
DIARRHEA: 0
NAUSEA: 0
VOMITING: 0
COUGH: 0
TROUBLE SWALLOWING: 0
ABDOMINAL PAIN: 0
SHORTNESS OF BREATH: 1
BACK PAIN: 0
RHINORRHEA: 0
EYE PAIN: 0
SINUS PAIN: 0
WHEEZING: 0

## 2021-08-14 ASSESSMENT — PAIN SCALES - GENERAL: PAINLEVEL_OUTOF10: 0

## 2021-08-14 NOTE — TELEPHONE ENCOUNTER
Received call from [facility] at [employer] with Red Flag Complaint. Brief description of triage: see below. Triage indicates for patient to be seen in the next 4 hours. Care advice provided, patient verbalizes understanding; denies any other questions or concerns; instructed to call back for any new or worsening symptoms. Attention Provider: Thank you for allowing me to participate in the care of your patient. The patient was connected to triage in response to information provided to the [facility]. Please do not respond through this encounter as the response is not directed to a shared pool. Reason for Disposition   [1] Thigh, calf, or ankle swelling AND [2] bilateral AND [3] 1 side is more swollen    Answer Assessment - Initial Assessment Questions  1. ONSET: \"When did the swelling start? \" (e.g., minutes, hours, days)      08/14/2021    2. LOCATION: \"What part of the leg is swollen? \"  \"Are both legs swollen or just one leg? \"      Both legs swollen from the knee down. Left side is bigger. 3. SEVERITY: \"How bad is the swelling? \" (e.g., localized; mild, moderate, severe)   - Localized - small area of swelling localized to one leg   - MILD pedal edema - swelling limited to foot and ankle, pitting edema < 1/4 inch (6 mm) deep, rest and elevation eliminate most or all swelling   - MODERATE edema - swelling of lower leg to knee, pitting edema > 1/4 inch (6 mm) deep, rest and elevation only partially reduce swelling   - SEVERE edema - swelling extends above knee, facial or hand swelling present       Moderate. 4. REDNESS: \"Does the swelling look red or infected? \"      No     5. PAIN: \"Is the swelling painful to touch? \" If so, ask: \"How painful is it? \"   (Scale 1-10; mild, moderate or severe)      7    6. FEVER: \"Do you have a fever? \" If so, ask: \"What is it, how was it measured, and when did it start? \"       No     7. CAUSE: \"What do you think is causing the leg swelling? \"      She thought it was her knee. 8. MEDICAL HISTORY: \"Do you have a history of heart failure, kidney disease, liver failure, or cancer? \"     Family history of heart problems. Patient has afib    9. RECURRENT SYMPTOM: \"Have you had leg swelling before? \" If so, ask: \"When was the last time? \" \"What happened that time? \"      No     10. OTHER SYMPTOMS: \"Do you have any other symptoms? \" (e.g., chest pain, difficulty breathing)        No chest pain but she did have difficulty breathing when laying down. 11. PREGNANCY: \"Is there any chance you are pregnant? \" \"When was your last menstrual period? \"        No menopause    Protocols used: LEG SWELLING AND EDEMA-ADULT-

## 2021-08-14 NOTE — ED PROVIDER NOTES
78-year-old female presents today with complaints of bilateral lower extremity swelling onset 3 weeks ago and has been progressively getting worse. Her sx are moderate in severity and exacerbated by a long walk she had yesterday with her granddaughter. Her sx are associated with orthopnea. She had difficulty falling asleep laying down but was better sleeping sitting down in a chair. Shee started taking 20 mg of furosemide daily for the past week which has not relieved her symptoms. She has a hx of Afib and is currently on Eliquis and Diltazam which she has been compliant with. Review of Systems   Constitutional: Negative for diaphoresis and fever. HENT: Negative for ear pain, hearing loss, rhinorrhea, sinus pain, sore throat and trouble swallowing. Eyes: Negative for pain. Respiratory: Positive for shortness of breath. Negative for cough and wheezing. Cardiovascular: Positive for leg swelling. Negative for chest pain and palpitations. Gastrointestinal: Negative for abdominal pain, diarrhea, nausea and vomiting. Endocrine: Negative for polyuria. Genitourinary: Negative for flank pain, frequency, hematuria and urgency. Musculoskeletal: Negative for back pain, neck pain and neck stiffness. Neurological: Negative for dizziness, speech difficulty, weakness, light-headedness and numbness. Psychiatric/Behavioral: Negative for confusion. The patient is not nervous/anxious. Physical Exam  Constitutional:       General: She is not in acute distress. Appearance: Normal appearance. She is not ill-appearing, toxic-appearing or diaphoretic. HENT:      Head: Normocephalic and atraumatic. Nose: No rhinorrhea. Eyes:      General: No scleral icterus. Extraocular Movements: Extraocular movements intact. Pupils: Pupils are equal, round, and reactive to light. Cardiovascular:      Heart sounds: Normal heart sounds. No murmur heard. No friction rub. No gallop. Pulmonary:      Breath sounds: Normal breath sounds. No wheezing, rhonchi or rales. Abdominal:      Palpations: Abdomen is soft. Tenderness: There is no abdominal tenderness. Musculoskeletal:         General: No swelling. Cervical back: Normal range of motion. No rigidity or tenderness. Right lower leg: Swelling present. Edema present. Left lower leg: Swelling present. Edema present. Comments: Bilateral lower extremity, nonpitting edema   Lymphadenopathy:      Cervical: No cervical adenopathy. Skin:     General: Skin is warm and dry. Coloration: Skin is not jaundiced. Neurological:      General: No focal deficit present. Mental Status: She is alert and oriented to person, place, and time. Cranial Nerves: No cranial nerve deficit. Sensory: No sensory deficit. Motor: No weakness. Psychiatric:         Mood and Affect: Mood normal.         Behavior: Behavior normal.         Thought Content: Thought content normal.         Judgment: Judgment normal.          Procedures     EKG: This EKG is signed and interpreted by me. Rate: 64  Rhythm: Atrial fibrillation  Interpretation: no acute changes, iregularly irregular rhythm, no ST elevations or T wave inversions. Normal intervals  Comparison: stable as compared to patient's most recent EKG      MDM  Number of Diagnoses or Management Options  Bilateral lower extremity edema  Osteoarthritis of both knees, unspecified osteoarthritis type  Diagnosis management comments: This is a 75-year-old female who presents today with complaints of bilateral lower extremity swelling as well as orthopnea. Her symptoms started yesterday and have been progressively getting worse. She was unsure if she could take more of her furosemide and tried calling her PCP without adding an answer. On exam her blood pressure is slightly elevated O2 sat of 93. Lungs clear to auscultation bilaterally.   Bilateral lower extremities with nonpitting edema. Chest x-ray shows no pleural effusions. EKG shows no ST elevations. Delta Trop less than 5. No suspicion for acute coronary syndrome at this time. BNP of 951. Started her on 40 of Lasix. She was also given a Norco for her knee pain due to chronic osteoarthritis. Form the patient that she may take 2 of her 20 mg furosemide pills for the next 2 days. Schedule an appointment with her PCP for follow-up and for further management. She understands and agrees. Amount and/or Complexity of Data Reviewed  Decide to obtain previous medical records or to obtain history from someone other than the patient: yes         ED Course as of Aug 14 1341   Sat Aug 14, 2021   1330 EKG: This EKG is signed and interpreted by me. Rate: 64  Rhythm: A. fib  Interpretation: A. Fib, normal [place] interval, normal QRS, normal QT interval, no acute ST or T wave changes  Comparison: stable as compared to patient's most recent EKG        [JA]   1331 I reviewed the patient's chart. ECHO 10/2/16:  Normal left ventricular ejection fraction. Ejection fraction is visually estimated at 60-70%. Normal sized left atrium. Atrial fibrillation   Structurally normal mitral valve. he aortic valve is trileaflet. The aortic valve appears mildly sclerotic. No pulmonary hypertension   No evidence of pericardial effusion. [JA]      ED Course User Index  [JA] Jaun Drummond MD       ED Course as of Aug 14 1850   Sat Aug 14, 2021   1330 EKG: This EKG is signed and interpreted by me. Rate: 64  Rhythm: A. fib  Interpretation: A. Fib, normal [place] interval, normal QRS, normal QT interval, no acute ST or T wave changes  Comparison: stable as compared to patient's most recent EKG        [JA]   1331 I reviewed the patient's chart. ECHO 10/2/16:  Normal left ventricular ejection fraction. Ejection fraction is visually estimated at 60-70%. Normal sized left atrium.    Atrial fibrillation   Structurally normal mitral valve. he aortic valve is trileaflet. The aortic valve appears mildly sclerotic. No pulmonary hypertension   No evidence of pericardial effusion. [JA]      ED Course User Index  [HARIS] Cynthia Nelson MD       --------------------------------------------- PAST HISTORY ---------------------------------------------  Past Medical History:  has a past medical history of Acute renal failure syndrome ([address] Chest pain, Chronic pain syndrome, Colon polyps, Degenerative joint disease, Diarrhea, GERD (gastroesophageal reflux disease), History of DVT (deep vein thrombosis), History of pulmonary embolism, Hyperlipidemia, Hypertension, Insomnia, Obesity, Paroxysmal atrial fibrillation ([address] and Syncope and collapse. Past Surgical History:  has a past surgical history that includes joint replacement (Right, 2012); Colonoscopy (10/07/2016); Appendectomy; Cholecystectomy; and cardiovascular stress test (01/16/2020). Social History:  reports that she has never smoked. She has never used smokeless tobacco. She reports that she does not drink alcohol and does not use drugs. Family History: family history includes Diabetes type 2  in her brother, mother, and sister; Other in her father; Pancreatic Cancer in her brother and brother. The patients home medications have been reviewed.     Allergies: Penicillins    -------------------------------------------------- RESULTS -------------------------------------------------  Labs:  Results for orders placed or performed during the hospital encounter of 08/14/21   CBC auto differential   Result Value Ref Range    WBC 7.3 4.5 - 11.5 E9/L    RBC 4.29 3.50 - 5.50 E12/L    Hemoglobin 13.6 11.5 - 15.5 g/dL    Hematocrit 41.6 34.0 - 48.0 %    MCV 97.0 80.0 - 99.9 fL    MCH 31.7 26.0 - 35.0 pg    MCHC 32.7 32.0 - 34.5 %    RDW 13.2 11.5 - 15.0 fL    Platelets 932 507 - 583 E9/L    MPV 10.5 7.0 - 12.0 fL    Neutrophils % 69.4 43.0 - 80.0 %    Immature Granulocytes % (Temporal)   Resp 18   Ht 5' 6\" (1.676 m)   Wt 235 lb (106.6 kg)   SpO2 93%   BMI 37.93 kg/m²   Oxygen Saturation Interpretation: Normal      ------------------------------------------ PROGRESS NOTES ------------------------------------------  6:50 PM EDT  I have spoken with the patient and discussed todays results, in addition to providing specific details for the plan of care and counseling regarding the diagnosis and prognosis. Their questions are answered at this time and they are agreeable with the plan. I discussed at length with them reasons for immediate return here for re evaluation. They will followup with their primary care physician by calling their office on Monday.      --------------------------------- ADDITIONAL PROVIDER NOTES ---------------------------------  At this time the patient is without objective evidence of an acute process requiring hospitalization or inpatient management. They have remained hemodynamically stable throughout their entire ED visit and are stable for discharge with outpatient follow-up. The plan has been discussed in detail and they are aware of the specific conditions for emergent return, as well as the importance of follow-up. New Prescriptions    No medications on file       Diagnosis:  1. Bilateral lower extremity edema    2. Osteoarthritis of both knees, unspecified osteoarthritis type        Disposition:  Patient's disposition: Discharge to home  Patient's condition is stable.        Sanjiv Bernal DO  Resident  08/14/21 1783

## 2021-08-14 NOTE — ED NOTES
FIRST PROVIDER CONTACT ASSESSMENT NOTE                                                                                                Department of Emergency Medicine                                                      First Provider Note  21  1:05 PM EDT  NAME: Nu Ceballos  : 1945  MRN: [id number]    Chief Complaint: Leg Swelling (LEG SWELLINg x 3 weeks, states started water pill last week buit getting worse )      History of Present Illness:   Nu Ceballos is a 66 y.o. female who presents to the ED for bilateral leg swelling for the last 3 weeks. Patient states she started water pill last week but is getting worse. Patient states she went to her family doctor but they were closed. Patient states has been taking the pills and they are not helping. Patient states she is feeling more short of breath. Patient has a history of atrial fibrillation but does not have a history of congestive heart failure. Patient was told to come in to the ER to ensure she was not fluid overloaded    Focused Physical Exam:  VS:    ED Triage Vitals   BP Temp Temp src Pulse Resp SpO2 Height Weight   21 1304 -- -- 21 1304 21 1304 21 1304 21 1305 21 1305   (!) 163/77   75 18 93 % 5' 6\" (1.676 m) 235 lb (106.6 kg)        General: Alert and in no apparent distress. Medical History:  has a past medical history of Acute renal failure syndrome ([address] Chest pain, Chronic pain syndrome, Colon polyps, Degenerative joint disease, Diarrhea, GERD (gastroesophageal reflux disease), History of DVT (deep vein thrombosis), History of pulmonary embolism, Hyperlipidemia, Hypertension, Insomnia, Obesity, Paroxysmal atrial fibrillation ([address] and Syncope and collapse. Surgical History:  has a past surgical history that includes joint replacement (Right, ); Colonoscopy (10/07/2016); Appendectomy; Cholecystectomy; and cardiovascular stress test (2020).     Social History:  reports that she has never smoked. She has never used smokeless tobacco. She reports that she does not drink alcohol and does not use drugs. Family History: family history includes Diabetes type 2  in her brother, mother, and sister; Other in her father; Pancreatic Cancer in her brother and brother.     Allergies: Penicillins     Initial Plan of Care:  Initiate Treatment-Testing, Proceed toTreatment Area When Bed Available for ED Attending/MLP to Continue Care    -------------------------------------------------END OF FIRST PROVIDER CONTACT ASSESSMENT NOTE--------------------------------------------------------  Electronically signed by Caroline Bergeron PA-C   DD: 8/14/21       Caroline Bergeron PA-C  08/14/21 1306

## 2021-08-16 ENCOUNTER — HOSPITAL ENCOUNTER (OUTPATIENT)
Age: 76
Setting detail: OBSERVATION
Discharge: HOME | End: 2021-08-18
Attending: EMERGENCY MEDICINE | Admitting: STUDENT IN AN ORGANIZED HEALTH CARE EDUCATION/TRAINING PROGRAM
Payer: MEDICARE

## 2021-08-16 ENCOUNTER — APPOINTMENT (OUTPATIENT)
Dept: GENERAL RADIOLOGY | Age: 76
End: 2021-08-16
Payer: MEDICARE

## 2021-08-16 DIAGNOSIS — R06.09 EXERTIONAL DYSPNEA: Primary | ICD-10-CM

## 2021-08-16 DIAGNOSIS — I50.9 CONGESTIVE HEART FAILURE, UNSPECIFIED HF CHRONICITY, UNSPECIFIED HEART FAILURE TYPE (HCC): ICD-10-CM

## 2021-08-16 PROBLEM — I20.9 ANGINA PECTORIS (HCC): Status: ACTIVE | Noted: 2021-08-16

## 2021-08-16 LAB
ALBUMIN SERPL-MCNC: 4 G/DL (ref 3.5–5.2)
ALP BLD-CCNC: 84 U/L (ref 35–104)
ALT SERPL-CCNC: 10 U/L (ref 0–32)
ANION GAP SERPL CALCULATED.3IONS-SCNC: 13 MMOL/L (ref 7–16)
AST SERPL-CCNC: 18 U/L (ref 0–31)
BASOPHILS ABSOLUTE: 0.05 E9/L (ref 0–0.2)
BASOPHILS RELATIVE PERCENT: 0.7 % (ref 0–2)
BILIRUB SERPL-MCNC: 0.7 MG/DL (ref 0–1.2)
BUN BLDV-MCNC: 17 MG/DL (ref 6–23)
CALCIUM SERPL-MCNC: 9 MG/DL (ref 8.6–10.2)
CHLORIDE BLD-SCNC: 105 MMOL/L (ref 98–107)
CO2: 24 MMOL/L (ref 22–29)
CREAT SERPL-MCNC: 1.1 MG/DL (ref 0.5–1)
EKG ATRIAL RATE: 77 BPM
EKG Q-T INTERVAL: 370 MS
EKG QRS DURATION: 88 MS
EKG QTC CALCULATION (BAZETT): 450 MS
EKG R AXIS: 26 DEGREES
EKG T AXIS: -16 DEGREES
EKG VENTRICULAR RATE: 89 BPM
EOSINOPHILS ABSOLUTE: 0.06 E9/L (ref 0.05–0.5)
EOSINOPHILS RELATIVE PERCENT: 0.8 % (ref 0–6)
GFR AFRICAN AMERICAN: 58
GFR NON-AFRICAN AMERICAN: 48 ML/MIN/1.73
GLUCOSE BLD-MCNC: 121 MG/DL (ref 74–99)
HCT VFR BLD CALC: 43.2 % (ref 34–48)
HEMOGLOBIN: 14.4 G/DL (ref 11.5–15.5)
IMMATURE GRANULOCYTES #: 0.02 E9/L
IMMATURE GRANULOCYTES %: 0.3 % (ref 0–5)
LYMPHOCYTES ABSOLUTE: 1.28 E9/L (ref 1.5–4)
LYMPHOCYTES RELATIVE PERCENT: 17.4 % (ref 20–42)
MCH RBC QN AUTO: 31.6 PG (ref 26–35)
MCHC RBC AUTO-ENTMCNC: 33.3 % (ref 32–34.5)
MCV RBC AUTO: 94.7 FL (ref 80–99.9)
MONOCYTES ABSOLUTE: 0.58 E9/L (ref 0.1–0.95)
MONOCYTES RELATIVE PERCENT: 7.9 % (ref 2–12)
NEUTROPHILS ABSOLUTE: 5.38 E9/L (ref 1.8–7.3)
NEUTROPHILS RELATIVE PERCENT: 72.9 % (ref 43–80)
PDW BLD-RTO: 13.1 FL (ref 11.5–15)
PLATELET # BLD: 285 E9/L (ref 130–450)
PMV BLD AUTO: 10.7 FL (ref 7–12)
POTASSIUM REFLEX MAGNESIUM: 3.8 MMOL/L (ref 3.5–5)
PRO-BNP: 682 PG/ML (ref 0–450)
RBC # BLD: 4.56 E12/L (ref 3.5–5.5)
REASON FOR REJECTION: NORMAL
REJECTED TEST: NORMAL
SODIUM BLD-SCNC: 142 MMOL/L (ref 132–146)
T4 FREE: 1.16 NG/DL (ref 0.93–1.7)
TOTAL PROTEIN: 7 G/DL (ref 6.4–8.3)
TROPONIN, HIGH SENSITIVITY: 13 NG/L (ref 0–9)
TROPONIN, HIGH SENSITIVITY: 16 NG/L (ref 0–9)
TROPONIN, HIGH SENSITIVITY: 27 NG/L (ref 0–9)
TSH SERPL DL<=0.05 MIU/L-ACNC: 1.67 UIU/ML (ref 0.27–4.2)
WBC # BLD: 7.4 E9/L (ref 4.5–11.5)

## 2021-08-16 PROCEDURE — 85025 COMPLETE CBC W/AUTO DIFF WBC: CPT

## 2021-08-16 PROCEDURE — 6360000002 HC RX W HCPCS: Performed by: NURSE PRACTITIONER

## 2021-08-16 PROCEDURE — 84484 ASSAY OF TROPONIN QUANT: CPT

## 2021-08-16 PROCEDURE — G0378 HOSPITAL OBSERVATION PER HR: HCPCS

## 2021-08-16 PROCEDURE — 93010 ELECTROCARDIOGRAM REPORT: CPT | Performed by: INTERNAL MEDICINE

## 2021-08-16 PROCEDURE — 71046 X-RAY EXAM CHEST 2 VIEWS: CPT

## 2021-08-16 PROCEDURE — 84443 ASSAY THYROID STIM HORMONE: CPT

## 2021-08-16 PROCEDURE — 99285 EMERGENCY DEPT VISIT HI MDM: CPT

## 2021-08-16 PROCEDURE — 6370000000 HC RX 637 (ALT 250 FOR IP): Performed by: NURSE PRACTITIONER

## 2021-08-16 PROCEDURE — 84439 ASSAY OF FREE THYROXINE: CPT

## 2021-08-16 PROCEDURE — 83880 ASSAY OF NATRIURETIC PEPTIDE: CPT

## 2021-08-16 PROCEDURE — 99214 OFFICE O/P EST MOD 30 MIN: CPT | Performed by: INTERNAL MEDICINE

## 2021-08-16 PROCEDURE — 93005 ELECTROCARDIOGRAM TRACING: CPT | Performed by: EMERGENCY MEDICINE

## 2021-08-16 PROCEDURE — 96374 THER/PROPH/DIAG INJ IV PUSH: CPT

## 2021-08-16 PROCEDURE — 36415 COLL VENOUS BLD VENIPUNCTURE: CPT

## 2021-08-16 PROCEDURE — APPSS180 APP SPLIT SHARED TIME > 60 MINUTES: Performed by: NURSE PRACTITIONER

## 2021-08-16 PROCEDURE — 80053 COMPREHEN METABOLIC PANEL: CPT

## 2021-08-16 PROCEDURE — 2580000003 HC RX 258: Performed by: STUDENT IN AN ORGANIZED HEALTH CARE EDUCATION/TRAINING PROGRAM

## 2021-08-16 PROCEDURE — 6370000000 HC RX 637 (ALT 250 FOR IP): Performed by: STUDENT IN AN ORGANIZED HEALTH CARE EDUCATION/TRAINING PROGRAM

## 2021-08-16 RX ORDER — ACETAMINOPHEN 325 MG/1
650 TABLET ORAL EVERY 6 HOURS PRN
Status: DISCONTINUED | OUTPATIENT
Start: 2021-08-16 | End: 2021-08-18 | Stop reason: HOSPADM

## 2021-08-16 RX ORDER — ONDANSETRON 2 MG/ML
4 INJECTION INTRAMUSCULAR; INTRAVENOUS EVERY 6 HOURS PRN
Status: DISCONTINUED | OUTPATIENT
Start: 2021-08-16 | End: 2021-08-18 | Stop reason: HOSPADM

## 2021-08-16 RX ORDER — DILTIAZEM HYDROCHLORIDE 180 MG/1
360 CAPSULE, COATED, EXTENDED RELEASE ORAL DAILY
Status: DISCONTINUED | OUTPATIENT
Start: 2021-08-16 | End: 2021-08-18 | Stop reason: HOSPADM

## 2021-08-16 RX ORDER — ONDANSETRON 4 MG/1
4 TABLET, ORALLY DISINTEGRATING ORAL EVERY 8 HOURS PRN
Status: DISCONTINUED | OUTPATIENT
Start: 2021-08-16 | End: 2021-08-18 | Stop reason: HOSPADM

## 2021-08-16 RX ORDER — SODIUM CHLORIDE 0.9 % (FLUSH) 0.9 %
5-40 SYRINGE (ML) INJECTION PRN
Status: DISCONTINUED | OUTPATIENT
Start: 2021-08-16 | End: 2021-08-18 | Stop reason: HOSPADM

## 2021-08-16 RX ORDER — ASPIRIN 81 MG/1
81 TABLET, CHEWABLE ORAL DAILY
Status: DISCONTINUED | OUTPATIENT
Start: 2021-08-16 | End: 2021-08-16

## 2021-08-16 RX ORDER — BACLOFEN 10 MG/1
10 TABLET ORAL 2 TIMES DAILY
Status: DISCONTINUED | OUTPATIENT
Start: 2021-08-16 | End: 2021-08-18 | Stop reason: HOSPADM

## 2021-08-16 RX ORDER — SODIUM CHLORIDE 0.9 % (FLUSH) 0.9 %
5-40 SYRINGE (ML) INJECTION EVERY 12 HOURS SCHEDULED
Status: DISCONTINUED | OUTPATIENT
Start: 2021-08-16 | End: 2021-08-18 | Stop reason: HOSPADM

## 2021-08-16 RX ORDER — ERGOCALCIFEROL 1.25 MG/1
50000 CAPSULE ORAL
COMMUNITY
End: 2022-01-13 | Stop reason: SDUPTHER

## 2021-08-16 RX ORDER — FUROSEMIDE 10 MG/ML
40 INJECTION INTRAMUSCULAR; INTRAVENOUS DAILY
Status: DISCONTINUED | OUTPATIENT
Start: 2021-08-16 | End: 2021-08-17

## 2021-08-16 RX ORDER — SPIRONOLACTONE 25 MG/1
25 TABLET ORAL DAILY
Status: DISCONTINUED | OUTPATIENT
Start: 2021-08-16 | End: 2021-08-17

## 2021-08-16 RX ORDER — SODIUM CHLORIDE 9 MG/ML
25 INJECTION, SOLUTION INTRAVENOUS PRN
Status: DISCONTINUED | OUTPATIENT
Start: 2021-08-16 | End: 2021-08-18 | Stop reason: HOSPADM

## 2021-08-16 RX ORDER — ACETAMINOPHEN 650 MG/1
650 SUPPOSITORY RECTAL EVERY 6 HOURS PRN
Status: DISCONTINUED | OUTPATIENT
Start: 2021-08-16 | End: 2021-08-18 | Stop reason: HOSPADM

## 2021-08-16 RX ORDER — POLYETHYLENE GLYCOL 3350 17 G/17G
17 POWDER, FOR SOLUTION ORAL DAILY PRN
Status: DISCONTINUED | OUTPATIENT
Start: 2021-08-16 | End: 2021-08-18 | Stop reason: HOSPADM

## 2021-08-16 RX ADMIN — BACLOFEN 10 MG: 10 TABLET ORAL at 21:15

## 2021-08-16 RX ADMIN — SODIUM CHLORIDE, PRESERVATIVE FREE 10 ML: 5 INJECTION INTRAVENOUS at 10:51

## 2021-08-16 RX ADMIN — METOPROLOL TARTRATE 25 MG: 25 TABLET, FILM COATED ORAL at 10:51

## 2021-08-16 RX ADMIN — DILTIAZEM HYDROCHLORIDE 360 MG: 180 CAPSULE, COATED, EXTENDED RELEASE ORAL at 10:50

## 2021-08-16 RX ADMIN — BACLOFEN 10 MG: 10 TABLET ORAL at 10:50

## 2021-08-16 RX ADMIN — APIXABAN 5 MG: 5 TABLET, FILM COATED ORAL at 12:00

## 2021-08-16 RX ADMIN — SPIRONOLACTONE 25 MG: 25 TABLET ORAL at 15:08

## 2021-08-16 RX ADMIN — SODIUM CHLORIDE, PRESERVATIVE FREE 10 ML: 5 INJECTION INTRAVENOUS at 21:15

## 2021-08-16 RX ADMIN — APIXABAN 5 MG: 5 TABLET, FILM COATED ORAL at 21:15

## 2021-08-16 RX ADMIN — FUROSEMIDE 40 MG: 10 INJECTION, SOLUTION INTRAMUSCULAR; INTRAVENOUS at 15:08

## 2021-08-16 RX ADMIN — SODIUM CHLORIDE, PRESERVATIVE FREE 10 ML: 5 INJECTION INTRAVENOUS at 13:00

## 2021-08-16 RX ADMIN — METOPROLOL TARTRATE 25 MG: 25 TABLET, FILM COATED ORAL at 21:15

## 2021-08-16 ASSESSMENT — PAIN SCALES - GENERAL
PAINLEVEL_OUTOF10: 0
PAINLEVEL_OUTOF10: 0

## 2021-08-16 NOTE — H&P
IMM/4M Hospitalist (overnight coverage) History & Physical    Chief Complaint: Shortness of Breath (recently treated here and discharged for \"too much fluid\". Trouble sleeping and laying flat last night.) and Palpitations  Primary Care Physician: Mehnaz Martínez DO    History of Present Illness  Charley Higginbotham is a 66y.o. year old female with past medical history of HLD, HTN, DVT/PE and afib on eliquis and diltiazem admitted for further workup and management of acute shortness of breath. Patient reports having dyspnea starting 3 days ago that has progressively worsened, which brought her the ED. Patient was brought in by family due to worsening dyspnea with associated palpitations lasting about 20 minutes prior to coming to the ED. Of note, patient was seen in the ED 2 days ago on 8/14 due to worsening bilateral leg swelling. Patient had been on 20 mg of furosemide daily for the last week and a half with no relief in her symptoms. She endorses associated orthopnea but denies any chest pain. Patient's symptoms were moderate in severity, worse with exertion with no reported remitting factors. He otherwise denies any nausea, vomiting, fever, chills, abdominal pain, changes in bowels or urinary symptoms. Patient was seen and evaluated this morning at bedside. She reports significant improvement in her presenting symptoms. Patient states that she is no longer short of breath and denies any chest pain or other associated symptoms at this time. Patient is saturating well on room air when examined. Cardiology consult is pending and their recommendations are appreciated. No signs of fluid overload- chest clear with no peripheral edema noted. There are no family or friends at bedside. The history is provided by the patient who was felt to be a good historian. ED course:   Initial blood work and imaging studies performed. Admission recommended by ED physician.  Therapy in ED consisted of the following:  Medications regadenoson (LEXISCAN) injection 0.4 mg (has no administration in time range)   sodium chloride flush 0.9 % injection 5-40 mL (10 mLs Intravenous Given 8/16/21 1051)   sodium chloride flush 0.9 % injection 5-40 mL (has no administration in time range)   0.9 % sodium chloride infusion (has no administration in time range)   ondansetron (ZOFRAN-ODT) disintegrating tablet 4 mg (has no administration in time range)     Or   ondansetron (ZOFRAN) injection 4 mg (has no administration in time range)   acetaminophen (TYLENOL) tablet 650 mg (has no administration in time range)     Or   acetaminophen (TYLENOL) suppository 650 mg (has no administration in time range)   polyethylene glycol (GLYCOLAX) packet 17 g (has no administration in time range)   aspirin chewable tablet 81 mg (81 mg Oral Not Given 8/16/21 1050)   baclofen (LIORESAL) tablet 10 mg (10 mg Oral Given 8/16/21 1050)   dilTIAZem (CARDIZEM CD) extended release capsule 360 mg (360 mg Oral Given 8/16/21 1050)   apixaban (ELIQUIS) tablet 5 mg (has no administration in time range)   metoprolol tartrate (LOPRESSOR) tablet 25 mg (25 mg Oral Given 8/16/21 1051)       Past Medical History:   Diagnosis Date    Acute renal failure syndrome ([address] 10/1/16  10/07/2016    2ry severe dehydration     Chest pain 01/15/2020    R/O ACS- normal stress    Chronic pain syndrome     Colon polyps     Microscopic colitis    Degenerative joint disease     Involving multiple joints    Diarrhea     2ry C-Diff 10/1/2016    GERD (gastroesophageal reflux disease)     History of DVT (deep vein thrombosis)     History of pulmonary embolism     following knee surgery    Hyperlipidemia     Hypertension     Insomnia     denies as of 1/15/20    Obesity     Paroxysmal atrial fibrillation (HCC)     Syncope and collapse        Past Surgical History:   Procedure Laterality Date    APPENDECTOMY      CARDIOVASCULAR STRESS TEST  01/16/2020    Normal    CHOLECYSTECTOMY      COLONOSCOPY 10/07/2016    JOINT REPLACEMENT Right 2012    TKA       Family History   Problem Relation Age of Onset    Pancreatic Cancer Brother     Diabetes type 2  Brother     Diabetes type 2  Mother     Other Father         AAA    Diabetes type 2  Sister    [facility] Pancreatic Cancer Brother        Mother with history of stroke and father with history of aortic aneurysm. Social History  Patient lives at home. Employment: retired  Illicit drug use- none  TOBACCO:   reports that she has never smoked. She has never used smokeless tobacco.  ETOH:   reports no history of alcohol use. Home Medications  Prior to Admission medications    Medication Sig Start Date End Date Taking? Authorizing Provider   vitamin D (ERGOCALCIFEROL) 1.25 MG (95256 UT) CAPS capsule Take 50,000 Units by mouth Twice a Week Monday and Thursday   Yes Historical Provider, MD   baclofen (LIORESAL) 10 MG tablet Take 1 tablet by mouth 2 times daily 7/26/21 8/25/21 Yes Javy Fletcher DO   furosemide (LASIX) 20 MG tablet Take 1 tablet by mouth daily  Patient taking differently: Take 40 mg by mouth daily  7/26/21  Yes Javy Fletcher DO   dilTIAZem (TIAZAC) 360 MG extended release capsule Take 1 capsule by mouth daily 7/6/21  Yes Yvette Foy DO   ELIQUIS 5 MG TABS tablet TAKE ONE TABLET BY MOUTH 2 TIMES A DAY 6/3/21  Yes Yvette Foy DO   metoprolol tartrate (LOPRESSOR) 25 MG tablet Take 1 tablet by mouth 2 times daily 5/7/21  Yes Yvette Foy DO   vitamin D (ERGOCALCIFEROL) 1.25 MG (41755 UT) CAPS capsule Take 1 capsule by mouth Twice a Week 4/26/21  Yes Yvette Foy DO   calcium carbonate (TUMS) 500 MG chewable tablet Take 2 tablets by mouth 2 times daily as needed for Heartburn OTC   Yes Historical Provider, MD       Allergies  Allergies   Allergen Reactions    Penicillins Hives       Review of Systems  Please see HPI above. All bolded are positive. All un-bolded are negative.   Constitutional Symptoms: fever, chills, fatigue, generalized weakness, diaphoresis, increase in thirst, loss of appetite  Eyes: vision change   Ears, Nose, Mouth, Throat: hearing loss, nasal congestion, sores in the mouth  Cardiovascular: chest pain, chest heaviness, palpitations  Respiratory: shortness of breath, wheezing, coughing  Gastrointestinal: abdominal pain, nausea, vomiting, diarrhea, constipation, melena, hematochezia, hematemesis  Genitourinary: dysuria, hematuria, or increase in frequency  Musculoskeletal: lower extremity edema, myalgias, arthralgias, back pain  Integumentary: rashes, itching   Neurological: headache, lightheadedness, dizziness, confusion, syncope, numbness, tingling, focal weakness  Psychiatric: depression, suicidal ideation, or anxiety  Endocrine: unintentional weight change  Hematologic/Lymphatic: lymphadenopathy, easy bruising, easy bleeding   Allergic/Immunologic: recurrent infections      Objective  VITALS:  BP (!) 179/91   Pulse 87   Temp 97.6 °F (36.4 °C) (Oral)   Resp 18   Ht 5' 6\" (1.676 m)   Wt 235 lb (106.6 kg)   SpO2 99%   BMI 37.93 kg/m²     Physical Exam:  General: awake, alert, oriented to person, place, time, and purpose, appears stated age, cooperative, no acute distress, pleasant  Eyes: conjunctivae/corneas clear, sclera non icteric, EOMI  Ears: no obvious scars, no lesions, no masses, hearing intact  Mouth: dry oral mucosa, no obvious oral sores  Head: normocephalic, atraumatic  Neck: no JVD, no adenopathy, no thyromegaly, neck is supple, trachea is midline  Back: ROM normal, no CVA tenderness.   Chest: no pain on palpation  Lungs: clear to auscultation bilaterally, without rhonchi, crackle, wheezing, or rale, no retractions or use of accessory muscles  Heart: regular rate and regular rhythm, no murmur, normal S1, S2  Abdomen: soft, non-tender; bowel sounds normal; no masses, no organomegaly  Extremities: no lower extremity edema, extremities atraumatic, no cyanosis, no clubbing, 2+ pedal pulses palpated  Skin: normal color, normal texture, normal turgor, no rashes, no lesions  Neurologic:5/5 muscle strength throughout, normal muscle tone throughout, face symmetric, hearing intact, tongue midline, speech appropriate without slurring, sensation to fine touch intact in upper and lower extremities    Labs-   Lab Results   Component Value Date    WBC 7.4 08/16/2021    HGB 14.4 08/16/2021    HCT 43.2 08/16/2021     08/16/2021     08/16/2021    K 3.8 08/16/2021     08/16/2021    CREATININE 1.1 (H) 08/16/2021    BUN 17 08/16/2021    CO2 24 08/16/2021    GLUCOSE 121 (H) 08/16/2021    ALT 10 08/16/2021    AST 18 08/16/2021    INR 1.1 10/06/2016     Lab Results   Component Value Date    CKTOTAL 112 10/01/2016    CKMB 4.4 (H) 10/01/2016    TROPONINI <0.01 01/16/2020       Last echocardiogram:    Recent Radiological Studies:  XR CHEST (2 VW)   Final Result   No acute process. NM Cardiac Stress Test Nuclear Imaging    (Results Pending)       Assessment  Patient Active Problem List    Diagnosis Date Noted    Exertional dyspnea 08/16/2021    Angina pectoris ([address] 08/16/2021    Primary osteoarthritis of left knee 01/22/2020    Actinic keratoses 01/22/2020    History of pulmonary embolism     GERD (gastroesophageal reflux disease)     Hypertension     Chronic pain syndrome     Generalized osteoarthritis 12/20/2016    Paroxysmal atrial fibrillation ([address] 10/11/2016    Obesity     History of DVT (deep vein thrombosis)     Hyperlipidemia 10/01/2016    Insomnia 09/06/2016       Plan  · Dyspnea, exertional-- elevated trops: Observation. Trend trops. Telemetry. Stress Test tomorrow 8/17. Cardiology input appreciated -- Lasix 40 daily IV ordered as well as Aldactone 25 daily PO  · Afib, stable: cont eliquis and diltiazem  · Continue home medications  · PT/OT  · Follow labs  · DVT prophylaxis. Continue eliquis   · Please see orders for further management and care.   ·  for

## 2021-08-16 NOTE — ED PROVIDER NOTES
Department of Emergency Medicine   ED  Provider Note  Admit Date/RoomTime: 8/16/2021  6:36 AM  ED Room: 2757/4161-N          History of Present Illness:  8/16/21, Time: 6:59 AM EDT  Chief Complaint   Patient presents with    Shortness of Breath     recently treated here and discharged for \"too much fluid\". Trouble sleeping and laying flat last night.  Palpitations                Zachery Shay is a 66 y.o. female presenting to the ED for dyspnea, beginning 3 days ago. The complaint has been constant, moderate in severity, and worsened by laying flat. Patient presents with increasing dyspnea. She was recently evaluated for having \"too much fluid. \"  She increased her Lasix at home however she has still had more trouble sleeping and increased dyspnea while laying flat. She admits to increased palpitations and dramatically worsened dyspnea that lasted for approximately 20 minutes earlier today. She states this is somewhat improved but she still feels more short of breath than she has for the last few days. She denies any chest pain. Denies unilateral leg swelling but states it has not gotten better since her last evaluation. Denies fever chills cough or congestion. Review of Systems:   Pertinent positives and negatives are stated within HPI, all other systems reviewed and are negative.        --------------------------------------------- PAST HISTORY ---------------------------------------------  Past Medical History:  has a past medical history of Acute renal failure syndrome ([address] Chest pain, Chronic pain syndrome, Colon polyps, Degenerative joint disease, Diarrhea, GERD (gastroesophageal reflux disease), History of DVT (deep vein thrombosis), History of pulmonary embolism, Hyperlipidemia, Hypertension, Insomnia, Obesity, Paroxysmal atrial fibrillation ([address] and Syncope and collapse. Past Surgical History:  has a past surgical history that includes joint replacement (Right, 2012);  Colonoscopy (10/07/2016); Appendectomy; Cholecystectomy; and cardiovascular stress test (01/16/2020). Social History:  reports that she has never smoked. She has never used smokeless tobacco. She reports that she does not drink alcohol and does not use drugs. Family History: family history includes Diabetes type 2  in her brother, mother, and sister; Other in her father; Pancreatic Cancer in her brother and brother. . Unless otherwise noted, family history is non contributory    The patients home medications have been reviewed. Allergies: Penicillins        ---------------------------------------------------PHYSICAL EXAM--------------------------------------    Constitutional/General: Alert and oriented x3  Head: Normocephalic and atraumatic  Eyes: PERRL, EOMI, sclera non icteric  Mouth: Oropharynx clear, handling secretions, no trismus, no asymmetry of the posterior oropharynx or uvular edema  Neck: Supple, full ROM, no stridor, no meningeal signs  Respiratory: Bibasilar rales, Not in respiratory distress  Cardiovascular:  Regular rate. Regular rhythm. 2+ distal pulses. Equal extremity pulses. Chest: No chest wall tenderness  GI:  Abdomen Soft, Non tender, Non distended. No rebound, guarding, or rigidity. Musculoskeletal: Moves all extremities x 4. Warm and well perfused, no clubbing, cyanosis. 2+ edema BLE. Capillary refill <3 seconds  Integument: skin warm and dry. No rashes. Neurologic: GCS 15, no focal deficits, symmetric strength 5/5 in the upper and lower extremities bilaterally  Psychiatric: Normal Affect      EKG: Interpreted by emergency department physician, Dr. Niesha Muñiz    This EKG is signed and interpreted by me.     Rate: 89  Rhythm: Atrial fibrillation  Interpretation: non-specific EKG  Comparison: stable as compared to patient's most recent EKG      -------------------------------------------------- RESULTS -------------------------------------------------  I have personally reviewed all laboratory and imaging results for this patient. Results are listed below.      LABS: (Lab results interpreted by me)  Results for orders placed or performed during the hospital encounter of 08/16/21   CBC Auto Differential   Result Value Ref Range    WBC 7.4 4.5 - 11.5 E9/L    RBC 4.56 3.50 - 5.50 E12/L    Hemoglobin 14.4 11.5 - 15.5 g/dL    Hematocrit 43.2 34.0 - 48.0 %    MCV 94.7 80.0 - 99.9 fL    MCH 31.6 26.0 - 35.0 pg    MCHC 33.3 32.0 - 34.5 %    RDW 13.1 11.5 - 15.0 fL    Platelets 907 213 - 971 E9/L    MPV 10.7 7.0 - 12.0 fL    Neutrophils % 72.9 43.0 - 80.0 %    Immature Granulocytes % 0.3 0.0 - 5.0 %    Lymphocytes % 17.4 (L) 20.0 - 42.0 %    Monocytes % 7.9 2.0 - 12.0 %    Eosinophils % 0.8 0.0 - 6.0 %    Basophils % 0.7 0.0 - 2.0 %    Neutrophils Absolute 5.38 1.80 - 7.30 E9/L    Immature Granulocytes # 0.02 E9/L    Lymphocytes Absolute 1.28 (L) 1.50 - 4.00 E9/L    Monocytes Absolute 0.58 0.10 - 0.95 E9/L    Eosinophils Absolute 0.06 0.05 - 0.50 E9/L    Basophils Absolute 0.05 0.00 - 0.20 E9/L   Comprehensive Metabolic Panel w/ Reflex to MG   Result Value Ref Range    Sodium 142 132 - 146 mmol/L    Potassium reflex Magnesium 3.8 3.5 - 5.0 mmol/L    Chloride 105 98 - 107 mmol/L    CO2 24 22 - 29 mmol/L    Anion Gap 13 7 - 16 mmol/L    Glucose 121 (H) 74 - 99 mg/dL    BUN 17 6 - 23 mg/dL    CREATININE 1.1 (H) 0.5 - 1.0 mg/dL    GFR Non-African American 48 >=60 mL/min/1.73    GFR African American 58     Calcium 9.0 8.6 - 10.2 mg/dL    Total Protein 7.0 6.4 - 8.3 g/dL    Albumin 4.0 3.5 - 5.2 g/dL    Total Bilirubin 0.7 0.0 - 1.2 mg/dL    Alkaline Phosphatase 84 35 - 104 U/L    ALT 10 0 - 32 U/L    AST 18 0 - 31 U/L   Troponin   Result Value Ref Range    Troponin, High Sensitivity 16 (H) 0 - 9 ng/L   Brain Natriuretic Peptide   Result Value Ref Range    Pro- (H) 0 - 450 pg/mL   SPECIMEN REJECTION   Result Value Ref Range    Rejected Test TRP5     Reason for Rejection see below    Troponin   Result Value Ref Range    Troponin, High Sensitivity 13 (H) 0 - 9 ng/L   Troponin   Result Value Ref Range    Troponin, High Sensitivity 27 (H) 0 - 9 ng/L   T4, Free   Result Value Ref Range    T4 Free 1.16 0.93 - 1.70 ng/dL   TSH without Reflex   Result Value Ref Range    TSH 1.670 0.270 - 4.200 uIU/mL   CBC   Result Value Ref Range    WBC 6.9 4.5 - 11.5 E9/L    RBC 4.76 3.50 - 5.50 E12/L    Hemoglobin 15.0 11.5 - 15.5 g/dL    Hematocrit 46.7 34.0 - 48.0 %    MCV 98.1 80.0 - 99.9 fL    MCH 31.5 26.0 - 35.0 pg    MCHC 32.1 32.0 - 34.5 %    RDW 13.2 11.5 - 15.0 fL    Platelets 512 699 - 194 E9/L    MPV 10.5 7.0 - 12.0 fL   Basic Metabolic Panel w/ Reflex to MG   Result Value Ref Range    Sodium 136 132 - 146 mmol/L    Potassium reflex Magnesium 4.8 3.5 - 5.0 mmol/L    Chloride 99 98 - 107 mmol/L    CO2 31 (H) 22 - 29 mmol/L    Anion Gap 6 (L) 7 - 16 mmol/L    Glucose 127 (H) 74 - 99 mg/dL    BUN 21 6 - 23 mg/dL    CREATININE 1.5 (H) 0.5 - 1.0 mg/dL    GFR Non-African American 34 >=60 mL/min/1.73    GFR African American 41     Calcium 9.7 8.6 - 10.2 mg/dL   EKG 12 Lead   Result Value Ref Range    Ventricular Rate 89 BPM    Atrial Rate 77 BPM    QRS Duration 88 ms    Q-T Interval 370 ms    QTc Calculation (Bazett) 450 ms    R Axis 26 degrees    T Axis -16 degrees   EKG 12 Lead   Result Value Ref Range    Ventricular Rate 89 BPM    Atrial Rate 86 BPM    QRS Duration 84 ms    Q-T Interval 378 ms    QTc Calculation (Bazett) 459 ms    R Axis 14 degrees    T Axis -15 degrees   ,       RADIOLOGY:  Interpreted by Radiologist unless otherwise specified  NM Cardiac Stress Test Nuclear Imaging   Final Result      The myocardial perfusion imaging was normal      Overall left ventricular systolic function was normal, LVEF 65%      Low risk myocardial perfusion study.       Compared to previous study from January 2020 which showed subtle   reversible ischemia of the anteroseptal, anterolateral wall with the   EF 73%            Esvin Villafana MD, FASNC      XR CHEST (2 VW)   Final Result   No acute process. ------------------------- NURSING NOTES AND VITALS REVIEWED ---------------------------   The nursing notes within the ED encounter and vital signs as below have been reviewed by myself  /75   Pulse 85   Temp 97.7 °F (36.5 °C) (Oral)   Resp 18   Ht 5' 6\" (1.676 m)   Wt 239 lb 12.8 oz (108.8 kg)   SpO2 96%   BMI 38.70 kg/m²     Oxygen Saturation Interpretation: Normal    The cardiac monitor revealed afib with a heart rate in the 80s as interpreted by me. The cardiac monitor was ordered secondary to the patient's heart rate and to monitor the patient for dysrhythmia. CPT 80272    The patients available past medical records and past encounters were reviewed.         ------------------------------ ED COURSE/MEDICAL DECISION MAKING----------------------  Medications   sodium chloride flush 0.9 % injection 5-40 mL (10 mLs Intravenous Given 8/17/21 1107)   sodium chloride flush 0.9 % injection 5-40 mL (10 mLs Intravenous Given 8/16/21 1300)   0.9 % sodium chloride infusion (has no administration in time range)   ondansetron (ZOFRAN-ODT) disintegrating tablet 4 mg (has no administration in time range)     Or   ondansetron (ZOFRAN) injection 4 mg (has no administration in time range)   acetaminophen (TYLENOL) tablet 650 mg (has no administration in time range)     Or   acetaminophen (TYLENOL) suppository 650 mg (has no administration in time range)   polyethylene glycol (GLYCOLAX) packet 17 g (has no administration in time range)   baclofen (LIORESAL) tablet 10 mg (10 mg Oral Given 8/17/21 1107)   dilTIAZem (CARDIZEM CD) extended release capsule 360 mg (360 mg Oral Given 8/17/21 1107)   metoprolol tartrate (LOPRESSOR) tablet 25 mg (25 mg Oral Given 8/17/21 1107)   apixaban (ELIQUIS) tablet 5 mg (5 mg Oral Given 8/17/21 1107)   furosemide (LASIX) tablet 40 mg (has no administration in time range)   spironolactone (ALDACTONE) tablet 25 mg (has no administration in time range)   regadenoson (LEXISCAN) injection 0.4 mg (0.4 mg Intravenous Given 8/17/21 0855)   perflutren lipid microspheres (DEFINITY) injection 1.65 mg (1.65 mg Intravenous Given 8/17/21 0750)   technetium sestamibi (CARDIOLITE) injection 10 millicurie (10 millicuries Intravenous Given 8/17/21 0723)   technetium sestamibi (CARDIOLITE) injection 30 millicurie (30 millicuries Intravenous Given 8/17/21 0723)                    Medical Decision Making:     I, Dr. Kelsey Parker am the primary provider of record    Patient presents with increased dyspnea since being evaluated recently for this. She was found to have heart failure and was placed on Lasix. During that time she has not gotten better and had a dramatic worsening of her dyspnea earlier this morning with associated diaphoresis. I am concerned for cardiac etiology. At this time the patient's A. fib was within limits of 80s to 90s so I do not think A. fib RVR is playing a part. The patient is already on blood thinners. I am not suspicious for pulmonary embolism. The patient describes exertional dyspnea and I believe she would benefit from stress testing as soon as possible. I believe she would benefit from admission for further evaluation and management. Discussed patient care with admitting physician. After discussing with the patient of who her cardiologist as she tells me she has not seen Dr. Ramesh Eric in years since being diagnosed with atrial fibrillation and saw Dr. Jany Macias briefly to have a stress test done last January but she does not have a cardiologist at this time. Admitting physician request Dr. Emily Escobedo be consulted. Consult was placed.       Re-Evaluations:       Patient asymptomatic at rest.      This patient's ED course included: a personal history and physicial examination, re-evaluation prior to disposition, multiple bedside re-evaluations, cardiac monitoring, continuous pulse oximetry and complex medical decision making and emergency management    This patient has remained hemodynamically stable during their ED course. Counseling: The emergency provider has spoken with the patient and discussed todays results, in addition to providing specific details for the plan of care and counseling regarding the diagnosis and prognosis. Questions are answered at this time and they are agreeable with the plan.       --------------------------------- IMPRESSION AND DISPOSITION ---------------------------------    IMPRESSION  1. Exertional dyspnea    2. Congestive heart failure, unspecified HF chronicity, unspecified heart failure type ([address]        DISPOSITION  Disposition: Admit to telemetry  Patient condition is stable        NOTE: This report was transcribed using voice recognition software.  Every effort was made to ensure accuracy; however, inadvertent computerized transcription errors may be present        Taty Lizarraga MD  08/17/21 [id number]

## 2021-08-16 NOTE — CONSULTS
Inpatient Cardiology Consultation      Reason for Consult: Exertional dyspnea     Consulting Physician: Dr. Sharyle King    Requesting Physician:  Dr. Donna Jett     Date of Consultation: 8/16/2021      HISTORY OF PRESENT ILLNESS:     Ms. Krystal Alicea is a 66year old female who is known to [facility] through inpatient consults only. She has a PMHx of chronic atrial fibrillation, HTN, HLD, obesity. She presented to the emergency room with complaints of increased dyspnea and lower extremity edema. She was seen per her PCP on 7/26/2021 with noted increased bilateral lower extremity edema. She was started on oral Lasix, scheduled for echocardiogram and referred to cardiology. Her symptoms persisted prompting presentation to the emergency room on 8/14/2021 with ongoing persistent lower extremity edema. Her oral Lasix was increased from 20 mg daily to 40 mg daily and she was discharged from the emergency room. She reports good urinary response to oral diuretic with clear yellow urine production. However she was unaware that she should be monitoring the sodium in her diet and does admit to eating increased sodium recently on fresh garden tomatoes. She represented to the emergency room on 8/16/2021 for complaints of increased shortness of breath. She had gone to sleep and awoke with orthopnea/PND. She attempted to sit up in the recliner but due to persistent shortness of breath with associated palpitations prompted her to call EMS. She denies any chest pain, pressure, heaviness, dizziness, lightheadedness, fever, chills, or recent illness. She has noted a chronic nonproductive cough since receiving the pfizer vaccine in 2/2021. Upon arrival to the emergency room twelve-lead EKG demonstrated atrial fibrillation with CVR. CXR with no acute process. proBNP 682. High-sensitivity troponin 16. She was admitted to the hospital for further evaluation and treatment.  She is currently laying in bed with her head elevated in no apparent acute distress. Please note: past medical records were reviewed per electronic medical record (EMR) - see detailed reports under Past Medical/ Surgical History. Past Medical History:    Past Medical History:   Diagnosis Date    Acute renal failure syndrome ([address] 10/1/16  10/07/2016    2ry severe dehydration     Chest pain 01/15/2020    R/O ACS- normal stress    Chronic pain syndrome     Colon polyps     Microscopic colitis    Degenerative joint disease     Involving multiple joints    Diarrhea     2ry C-Diff 10/1/2016    GERD (gastroesophageal reflux disease)     History of DVT (deep vein thrombosis)     History of pulmonary embolism     following knee surgery    Hyperlipidemia     Hypertension     Insomnia     denies as of 1/15/20    Obesity     Paroxysmal atrial fibrillation (HCC)     Syncope and collapse      PAST MEDICAL HISTORY:    1. Abnormal nuclear stress (2006, [address]) followed by unremarkable cardiac cath per patient report. 2. Stress (1/2020) subtle reversible ischemia at the apicoseptal and apicolateral walls  3. Permanent atrial fibrillation (dx 2016)  · She never followed up with any cardiologist, it seems she remained in atrial fibrillation since 2016. · [address] with [employer]   4. HTN  5. HLD  6. Hx of provoked PE following total knee replacement (2012)  7. GERD  8. Obesity   9. Probable SYD    CARDIAC TESTING:    TTE (10/3/2016, Dr. Patrick Rodriguez)   Summary:   Normal left ventricular ejection fraction. Ejection fraction is visually estimated at 60-70%. Normal sized left atrium. Atrial fibrillation   Structurally normal mitral valve. he aortic valve is trileaflet. The aortic valve appears mildly sclerotic. No pulmonary hypertension   No evidence of pericardial effusion. NM Stress (1/16/2020)  Impression  1.  Perfusion imaging suggests subtle reversible ischemia at the apicoseptal and apicolateral walls, but there is no corresponding wall motion abnormality. The second finding diminishes the positive predictive value of the first.  2. Ejection fraction is 73 %. 3. No significant wall motion abnormality         Past Surgical History:    Past Surgical History:   Procedure Laterality Date    APPENDECTOMY      CARDIOVASCULAR STRESS TEST  01/16/2020    Normal    CHOLECYSTECTOMY      COLONOSCOPY  10/07/2016    JOINT REPLACEMENT Right 2012    TKA       Medications Prior to admit:  Prior to Admission medications    Medication Sig Start Date End Date Taking?  Authorizing Provider   vitamin D (ERGOCALCIFEROL) 1.25 MG (99935 UT) CAPS capsule Take 50,000 Units by mouth Twice a Week Monday and Thursday   Yes Historical Provider, MD   baclofen (LIORESAL) 10 MG tablet Take 1 tablet by mouth 2 times daily 7/26/21 8/25/21 Yes Lauryn De La Paz DO   furosemide (LASIX) 20 MG tablet Take 1 tablet by mouth daily  Patient taking differently: Take 40 mg by mouth daily  7/26/21  Yes Lauryn De La Paz DO   dilTIAZem (TIAZAC) 360 MG extended release capsule Take 1 capsule by mouth daily 7/6/21  Yes Yvette Foy DO   ELIQUIS 5 MG TABS tablet TAKE ONE TABLET BY MOUTH 2 TIMES A DAY 6/3/21  Yes Yvette Foy DO   metoprolol tartrate (LOPRESSOR) 25 MG tablet Take 1 tablet by mouth 2 times daily 5/7/21  Yes Yvette Foy DO   vitamin D (ERGOCALCIFEROL) 1.25 MG (24914 UT) CAPS capsule Take 1 capsule by mouth Twice a Week 4/26/21  Yes Yvette Foy DO   calcium carbonate (TUMS) 500 MG chewable tablet Take 2 tablets by mouth 2 times daily as needed for Heartburn OTC   Yes Historical Provider, MD       Current Medications:    Current Facility-Administered Medications: regadenoson (LEXISCAN) injection 0.4 mg, 0.4 mg, Intravenous, ONCE PRN  sodium chloride flush 0.9 % injection 5-40 mL, 5-40 mL, Intravenous, 2 times per day  sodium chloride flush 0.9 % injection 5-40 mL, 5-40 mL, Intravenous, PRN  0.9 % sodium chloride infusion, 25 mL, Intravenous, PRN  ondansetron (ZOFRAN-ODT) disintegrating tablet 4 mg, 4 mg, Oral, Q8H PRN **OR** ondansetron (ZOFRAN) injection 4 mg, 4 mg, Intravenous, Q6H PRN  acetaminophen (TYLENOL) tablet 650 mg, 650 mg, Oral, Q6H PRN **OR** acetaminophen (TYLENOL) suppository 650 mg, 650 mg, Rectal, Q6H PRN  polyethylene glycol (GLYCOLAX) packet 17 g, 17 g, Oral, Daily PRN  baclofen (LIORESAL) tablet 10 mg, 10 mg, Oral, BID  dilTIAZem (CARDIZEM CD) extended release capsule 360 mg, 360 mg, Oral, Daily  metoprolol tartrate (LOPRESSOR) tablet 25 mg, 25 mg, Oral, BID  apixaban (ELIQUIS) tablet 5 mg, 5 mg, Oral, BID  perflutren lipid microspheres (DEFINITY) injection 1.65 mg, 1.5 mL, Intravenous, ONCE PRN  furosemide (LASIX) injection 40 mg, 40 mg, Intravenous, Daily  spironolactone (ALDACTONE) tablet 25 mg, 25 mg, Oral, Daily    Allergies:  Penicillins    Social History:    Never smoked, no etoh or illicit drug abuse   Social History     Socioeconomic History    Marital status: Single     Spouse name: Not on file    Number of children: Not on file    Years of education: Not on file    Highest education level: Not on file   Occupational History    Not on file   Tobacco Use    Smoking status: Never Smoker    Smokeless tobacco: Never Used   Vaping Use    Vaping Use: Never used   Substance and Sexual Activity    Alcohol use: No    Drug use: No    Sexual activity: Not on file   Other Topics Concern    Not on file   Social History Narrative    Not on file     Social Determinants of Health     Financial Resource Strain:     Difficulty of Paying Living Expenses:    Food Insecurity:     Worried About Running Out of Food in the Last Year:     Ran Out of Food in the Last Year:    Transportation Needs:     Lack of Transportation (Medical):      Lack of Transportation (Non-Medical):    Physical Activity:     Days of Exercise per Week:     Minutes of Exercise per Session:    Stress:     Feeling of Stress :    Social Connections:     Frequency of Communication with Friends and Family:     Frequency of Social Gatherings with Friends and Family:     Attends Mandaeism Services:     Active Member of Clubs or Organizations:     Attends Club or Organization Meetings:     Marital Status:    Intimate Partner Violence:     Fear of Current or Ex-Partner:     Emotionally Abused:     Physically Abused:     Sexually Abused:        Family History: Limited   Family History   Problem Relation Age of Onset    Pancreatic Cancer Brother     Diabetes type 2  Brother     Diabetes type 2  Mother     Other Father         AAA    Diabetes type 2  Sister     Pancreatic Cancer Brother        REVIEW OF SYSTEMS:     · Constitutional: Denies fatigue, fevers, chills or night sweats  · Eyes: Denies visual changes or drainage  · ENT: Denies headaches or hearing loss. No mouth sores or sore throat. No epistaxis   · Cardiovascular: Denies chest pain, pressure or palpitations. +lower extremity swelling. · Respiratory: ++HERRON, cough, orthopnea or PND. No hemoptysis   · Gastrointestinal: Denies hematemesis or anorexia. No hematochezia or melena    · Genitourinary: Denies urgency, dysuria or hematuria. · Musculoskeletal: Denies gait disturbance, weakness or joint complaints  · Integumentary: Denies rash, hives or pruritis   · Neurological: Denies dizziness, headaches or seizures. No numbness or tingling  · Psychiatric: Denies anxiety or depression. · Endocrine: Denies temperature intolerance. No recent weight change. · Hematologic/Lymphatic: Denies abnormal bruising or bleeding. No swollen lymph nodes    PHYSICAL EXAM:   /68   Pulse 82   Temp 98.1 °F (36.7 °C) (Oral)   Resp 18   Ht 5' 6\" (1.676 m)   Wt 235 lb (106.6 kg)   SpO2 96%   BMI 37.93 kg/m²   CONST:  Well developed, obese  female who appears of stated age. Awake, alert and cooperative. No apparent distress.    HEENT:   Head- Normocephalic, atraumatic   Eyes- Conjunctivae pink, anicteric  Throat- Oral mucosa pink and moist  Neck-  No stridor, trachea midline, no jugular venous distention. No carotid bruit. CHEST: Chest symmetrical and non-tender to palpation. No accessory muscle use or intercostal retractions  RESPIRATORY: Lung sounds - diminished bases    CARDIOVASCULAR:     Heart Inspection- shows no noted pulsations  Heart Palpation- no heaves or thrills; PMI is non-displaced   Heart Ausculation- Irregular rate and rhythm, no murmur. No s3, s4 or rub   PV: 1+ lower extremity edema. No varicosities. Pedal pulses palpable, no clubbing or cyanosis   ABDOMEN: Obese, soft, non-tender to light palpation. Bowel sounds present. No palpable masses no organomegaly; no abdominal bruit  MS: Good muscle strength and tone. No atrophy or abnormal movements. : Deferred  SKIN: Warm and dry no statis dermatitis or ulcers   NEURO / PSYCH: Oriented to person, place and time. Speech clear and appropriate. Follows all commands. Pleasant affect     DATA:    ECG / Tele strips: please see HPI   Diagnostic:    CXR (8/16/2021)  FINDINGS:  The lungs are without acute focal process.  There is no effusion or pneumothorax. The cardiomediastinal silhouette is without acute process. The osseous structures are without acute process. Impression  No acute process. Intake/Output Summary (Last 24 hours) at 8/16/2021 1443  Last data filed at 8/16/2021 1238  Gross per 24 hour   Intake 110 ml   Output --   Net 110 ml       Labs:   CBC:   Recent Labs     08/14/21  1415 08/16/21  0714   WBC 7.3 7.4   HGB 13.6 14.4   HCT 41.6 43.2    285     BMP:   Recent Labs     08/14/21  1415 08/14/21  1415 08/14/21  1735 08/16/21  0714     --   --  142   K 5.0   < > 3.8 3.8   CO2 24  --   --  24   BUN 18  --   --  17   CREATININE 1.1*  --   --  1.1*   LABGLOM 48  --   --  48   CALCIUM 8.8  --   --  9.0    < > = values in this interval not displayed.      TFT:   Lab Results   Component Value Date    TSH 2.390 04/20/2021    T4FREE 1.57 11/06/2020      HgA1c: Lab Results   Component Value Date    LABA1C 6.0 (H) 04/20/2021     proBNP:   Recent Labs     08/14/21  1415 08/16/21  0714   PROBNP 951* 682*     CARDIAC ENZYMES:  Recent Labs     08/14/21  1415 08/14/21  1525 08/16/21  0714   TROPHS 17* 13* 16*     FASTING LIPID PANEL:  Lab Results   Component Value Date    CHOL 152 04/20/2021    HDL 34 04/20/2021    LDLCALC 95 04/20/2021    TRIG 117 04/20/2021     LIVER PROFILE:  Recent Labs     08/14/21  1415 08/16/21  0714   AST 21 18   ALT 10 10   LABALBU 3.6 4.0       Electronically signed by Kyleigh Onofre DO on 8/16/2021 at 2:43 PM     I independently performed an evaluation on the patient. I have reviewed the above documentation completed by the advance practitioner. Please see my additional contributions to the HPI, physical exam, assessment and medical decision making. Physical Exam   /68   Pulse 82   Temp 98.1 °F (36.7 °C) (Oral)   Resp 18   Ht 5' 6\" (1.676 m)   Wt 235 lb (106.6 kg)   SpO2 96%   BMI 37.93 kg/m²   Constitutional: Oriented to person, place, and time. Well-developed and well-nourished. No distress. Head: Normocephalic and atraumatic. Eyes: EOM are normal. Pupils are equal, round, and reactive to light. Neck: Normal range of motion. Neck supple. No hepatojugular reflux and no JVD present. Carotid bruit is not present. No tracheal deviation present. No thyromegaly present. Cardiovascular: Normal rate, regular rhythm, normal heart sounds and intact distal pulses. Exam reveals no gallop and no friction rub. No murmur heard. Pulmonary/Chest: Effort normal and breath sounds normal. No respiratory distress. No wheezes. No rales. No tenderness. Abdominal: Soft. Bowel sounds are normal. No distension and no mass. No tenderness. No rebound and no guarding. Musculoskeletal: Normal range of motion. No edema and no tenderness. Lymphadenopathy:   No cervical adenopathy. Neurological: Alert and oriented to person, place, and time. Skin: Skin is warm and dry. No rash noted. Not diaphoretic. No erythema. Psychiatric: Normal mood and affect. Behavior is normal.     See accompanying documentation for full consult. ASSESSMENT AND PLAN:  1. SOB: See below. Echo. Pharm stress in am.    2. Acute CHF:     Echo pending to determine etiology. Diurese. Lasix/aldactone. Further recommendations based on above. 3. Chronic Afib/Palpitations: Rate control and eliquis. 4. HTN: Observe. 5. Hx of PE    6. Probable SYD    7. GERD    8. Obesity     Bernard Main D.O.   Cardiologist  Cardiology, [facility]

## 2021-08-17 ENCOUNTER — APPOINTMENT (OUTPATIENT)
Dept: NON INVASIVE DIAGNOSTICS | Age: 76
End: 2021-08-17
Payer: MEDICARE

## 2021-08-17 ENCOUNTER — APPOINTMENT (OUTPATIENT)
Dept: NUCLEAR MEDICINE | Age: 76
End: 2021-08-17
Payer: MEDICARE

## 2021-08-17 LAB
ANION GAP SERPL CALCULATED.3IONS-SCNC: 6 MMOL/L (ref 7–16)
BUN BLDV-MCNC: 21 MG/DL (ref 6–23)
CALCIUM SERPL-MCNC: 9.7 MG/DL (ref 8.6–10.2)
CHLORIDE BLD-SCNC: 99 MMOL/L (ref 98–107)
CO2: 31 MMOL/L (ref 22–29)
CREAT SERPL-MCNC: 1.5 MG/DL (ref 0.5–1)
EKG ATRIAL RATE: 86 BPM
EKG Q-T INTERVAL: 378 MS
EKG QRS DURATION: 84 MS
EKG QTC CALCULATION (BAZETT): 459 MS
EKG R AXIS: 14 DEGREES
EKG T AXIS: -15 DEGREES
EKG VENTRICULAR RATE: 89 BPM
GFR AFRICAN AMERICAN: 41
GFR NON-AFRICAN AMERICAN: 34 ML/MIN/1.73
GLUCOSE BLD-MCNC: 127 MG/DL (ref 74–99)
HCT VFR BLD CALC: 46.7 % (ref 34–48)
HEMOGLOBIN: 15 G/DL (ref 11.5–15.5)
LV EF: 60 %
LV EF: 65 %
LVEF MODALITY: NORMAL
LVEF MODALITY: NORMAL
MCH RBC QN AUTO: 31.5 PG (ref 26–35)
MCHC RBC AUTO-ENTMCNC: 32.1 % (ref 32–34.5)
MCV RBC AUTO: 98.1 FL (ref 80–99.9)
PDW BLD-RTO: 13.2 FL (ref 11.5–15)
PLATELET # BLD: 319 E9/L (ref 130–450)
PMV BLD AUTO: 10.5 FL (ref 7–12)
POTASSIUM REFLEX MAGNESIUM: 4.8 MMOL/L (ref 3.5–5)
RBC # BLD: 4.76 E12/L (ref 3.5–5.5)
SODIUM BLD-SCNC: 136 MMOL/L (ref 132–146)
WBC # BLD: 6.9 E9/L (ref 4.5–11.5)

## 2021-08-17 PROCEDURE — 93005 ELECTROCARDIOGRAM TRACING: CPT | Performed by: STUDENT IN AN ORGANIZED HEALTH CARE EDUCATION/TRAINING PROGRAM

## 2021-08-17 PROCEDURE — 93016 CV STRESS TEST SUPVJ ONLY: CPT | Performed by: INTERNAL MEDICINE

## 2021-08-17 PROCEDURE — 36415 COLL VENOUS BLD VENIPUNCTURE: CPT

## 2021-08-17 PROCEDURE — 80048 BASIC METABOLIC PNL TOTAL CA: CPT

## 2021-08-17 PROCEDURE — 93010 ELECTROCARDIOGRAM REPORT: CPT | Performed by: INTERNAL MEDICINE

## 2021-08-17 PROCEDURE — 3430000000 HC RX DIAGNOSTIC RADIOPHARMACEUTICAL: Performed by: RADIOLOGY

## 2021-08-17 PROCEDURE — 93018 CV STRESS TEST I&R ONLY: CPT | Performed by: INTERNAL MEDICINE

## 2021-08-17 PROCEDURE — G0378 HOSPITAL OBSERVATION PER HR: HCPCS

## 2021-08-17 PROCEDURE — 85027 COMPLETE CBC AUTOMATED: CPT

## 2021-08-17 PROCEDURE — 93306 TTE W/DOPPLER COMPLETE: CPT

## 2021-08-17 PROCEDURE — 6360000004 HC RX CONTRAST MEDICATION: Performed by: NURSE PRACTITIONER

## 2021-08-17 PROCEDURE — 6360000002 HC RX W HCPCS: Performed by: STUDENT IN AN ORGANIZED HEALTH CARE EDUCATION/TRAINING PROGRAM

## 2021-08-17 PROCEDURE — 78452 HT MUSCLE IMAGE SPECT MULT: CPT | Performed by: INTERNAL MEDICINE

## 2021-08-17 PROCEDURE — A9500 TC99M SESTAMIBI: HCPCS | Performed by: RADIOLOGY

## 2021-08-17 PROCEDURE — 6370000000 HC RX 637 (ALT 250 FOR IP): Performed by: STUDENT IN AN ORGANIZED HEALTH CARE EDUCATION/TRAINING PROGRAM

## 2021-08-17 PROCEDURE — 93017 CV STRESS TEST TRACING ONLY: CPT

## 2021-08-17 PROCEDURE — 78452 HT MUSCLE IMAGE SPECT MULT: CPT

## 2021-08-17 PROCEDURE — 2580000003 HC RX 258: Performed by: STUDENT IN AN ORGANIZED HEALTH CARE EDUCATION/TRAINING PROGRAM

## 2021-08-17 RX ORDER — SPIRONOLACTONE 25 MG/1
25 TABLET ORAL DAILY
Status: DISCONTINUED | OUTPATIENT
Start: 2021-08-18 | End: 2021-08-18

## 2021-08-17 RX ORDER — FUROSEMIDE 40 MG/1
40 TABLET ORAL DAILY
Status: DISCONTINUED | OUTPATIENT
Start: 2021-08-18 | End: 2021-08-18 | Stop reason: HOSPADM

## 2021-08-17 RX ADMIN — PERFLUTREN 1.65 MG: 6.52 INJECTION, SUSPENSION INTRAVENOUS at 07:50

## 2021-08-17 RX ADMIN — SODIUM CHLORIDE, PRESERVATIVE FREE 10 ML: 5 INJECTION INTRAVENOUS at 20:17

## 2021-08-17 RX ADMIN — Medication 10 MILLICURIE: at 07:23

## 2021-08-17 RX ADMIN — APIXABAN 5 MG: 5 TABLET, FILM COATED ORAL at 11:07

## 2021-08-17 RX ADMIN — Medication 30 MILLICURIE: at 07:23

## 2021-08-17 RX ADMIN — SODIUM CHLORIDE, PRESERVATIVE FREE 10 ML: 5 INJECTION INTRAVENOUS at 11:07

## 2021-08-17 RX ADMIN — METOPROLOL TARTRATE 25 MG: 25 TABLET, FILM COATED ORAL at 20:16

## 2021-08-17 RX ADMIN — REGADENOSON 0.4 MG: 0.08 INJECTION, SOLUTION INTRAVENOUS at 08:55

## 2021-08-17 RX ADMIN — DILTIAZEM HYDROCHLORIDE 360 MG: 180 CAPSULE, COATED, EXTENDED RELEASE ORAL at 11:07

## 2021-08-17 RX ADMIN — APIXABAN 5 MG: 5 TABLET, FILM COATED ORAL at 20:16

## 2021-08-17 RX ADMIN — BACLOFEN 10 MG: 10 TABLET ORAL at 20:16

## 2021-08-17 RX ADMIN — METOPROLOL TARTRATE 25 MG: 25 TABLET, FILM COATED ORAL at 11:07

## 2021-08-17 RX ADMIN — BACLOFEN 10 MG: 10 TABLET ORAL at 11:07

## 2021-08-17 ASSESSMENT — PAIN SCALES - GENERAL: PAINLEVEL_OUTOF10: 0

## 2021-08-17 NOTE — PROGRESS NOTES
[facility]        CARDIOLOGY                 INPATIENT PROGRESS NOTE          PATIENT SEEN IN FOLLOW UP FOR: CHF, A Fib    Hospital Day: 2     Ailyn Pabon is a 66year old patient seen in consultation by Dr Charmaine Olmos in 2016, Dr Dimitry Cutler in 2020, Dr Abdoul Sánchez this admission. .       SUBJECTIVE: Denies any CP, Breathing OK, No orthopnea, No palpitations  -seen in the Stress lab area    ROS: Review of rest of 8 systems negative except as mentioned above    OBJECTIVE: No acute distress. See Assessment     Diagnostics:       Telemetry: Reviewed, A fib         Intake/Output Summary (Last 24 hours) at 8/17/2021 0807  Last data filed at 8/17/2021 0617  Gross per 24 hour   Intake 110 ml   Output 900 ml   Net -790 ml       Labs:   CBC:   Recent Labs     08/16/21  0714 08/17/21  0521   WBC 7.4 6.9   HGB 14.4 15.0   HCT 43.2 46.7    319     BMP:   Recent Labs     08/16/21  0714 08/17/21  0521    136   K 3.8 4.8   CO2 24 31*   BUN 17 21   CREATININE 1.1* 1.5*   LABGLOM 48 34   CALCIUM 9.0 9.7     Mag: No results for input(s): MG in the last 72 hours. Phos: No results for input(s): PHOS in the last 72 hours. TSH:   Recent Labs     08/16/21  1400   TSH 1.670     HgA1c:     BNP: No results for input(s): BNP in the last 72 hours. PT/INR: No results for input(s): PROTIME, INR in the last 72 hours. APTT:No results for input(s): APTT in the last 72 hours. CARDIAC ENZYMES:No results for input(s): CKTOTAL, CKMB, CKMBINDEX, TROPONINI in the last 72 hours.   FASTING LIPID PANEL:  Lab Results   Component Value Date    CHOL 152 04/20/2021    HDL 34 04/20/2021    LDLCALC 95 04/20/2021    TRIG 117 04/20/2021     LIVER PROFILE:  Recent Labs     08/14/21  1415 08/16/21  0714   AST 21 18   ALT 10 10   LABALBU 3.6 4.0       Current Inpatient Medications:   sodium chloride flush  5-40 mL Intravenous 2 times per day    baclofen  10 mg Oral BID    dilTIAZem  360 mg Oral Daily    metoprolol tartrate  25 mg Oral BID    apixaban  5 mg Oral BID    furosemide  40 mg Intravenous Daily    spironolactone  25 mg Oral Daily       IV Infusions (if any):   sodium chloride           PHYSICAL EXAM:     CONSTITUTIONAL:   /89   Pulse 90   Temp 98.2 °F (36.8 °C) (Oral)   Resp 18   Ht 5' 6\" (1.676 m)   Wt 239 lb 12.8 oz (108.8 kg)   SpO2 97%   BMI 38.70 kg/m²   Pulse  Av.6  Min: 82  Max: 90  Systolic (37JZF), MEO:346 , Min:102 , SSS:875    Diastolic (09VUG), PQW:00, Min:62, Max:96    In general, this is a well developed, well nourished who appears stated age. awake, alert, no apparent distress  HEENT: eyes -conjunctivae pink,  Throat - Oral mucosa  moist.   Neck-  no stridor, no noted enlargement of the thyroid, no carotid bruit. , no jugular venous distention   RESPIRATORY: Chest symmetrical No accessory muscle use. Lung auscultation - few rhonchi  CARDIOVASCULAR:     Heart Palpation - no palpable thrills  Heart Ausculation - Irregular rhythm, 2/6 systolic murmur, No s3 or rub.   +lower extremity edema,. Distal pulses palpable, no cyanosis   ABDOMEN: Soft, nontender, nondistended. Bowel sounds present. MS: good muscle strength and tone. : Deferred  Rectal Exam: Deferred  SKIN: warm and dry   NEURO / PSYCH: oriented to person, place        ASSESSMENT/PLAN    Acute CHF-  Better - Systolic vs diastolic, Echo Pending, -ve 970cc; Change to PO and hold lasix; Monitor daily Wts, I/Os, BP, Renal Fn - Echo pending    Permanent A fib - Rates controlled with BB and Cardizem.  On Eliquis for [facility]    Hx of PE, DVT - On Eliquis    Essential Hypertension - Controlled    Non morbid obesity                    If stress/Echo OK and renal Fn stable home tomorrow    F/u by Dr Yoon Hodge Dear, 1-2 weeks after discharge      Electronically signed by Itz Torres MD on 2021 at 8:07 AM  [facility]) Cardiology O-Z Plasty Text: The defect edges were debeveled with a #15 scalpel blade.  Given the location of the defect, shape of the defect and the proximity to free margins an O-Z plasty (double transposition flap) was deemed most appropriate.  Using a sterile surgical marker, the appropriate transposition flaps were drawn incorporating the defect and placing the expected incisions within the relaxed skin tension lines where possible.    The area thus outlined was incised deep to adipose tissue with a #15 scalpel blade.  The skin margins were undermined to an appropriate distance in all directions utilizing iris scissors.  Hemostasis was achieved with electrocautery.  The flaps were then transposed into place, one clockwise and the other counterclockwise, and anchored with interrupted buried subcutaneous sutures.

## 2021-08-17 NOTE — PROGRESS NOTES
Lexiscan Stress Test:    Reason for study: CHF, A Fib    Resting EKG showed A fib  Exam: Heart - Irregular, Lungs- Few rhonchi    Patient received 0.4mg Lexiscan per protocol    Symptoms: No chest pain, No short of breath    EKG:  No ischemia; Underlying A fib, during Lexiscan infusion. Maximal heart rate 125        Peak /84 mmHg       Post test complications: None      SPECT image report pending.     Electronically signed by Lc Abdi MD on 8/17/2021 at 9:47 AM

## 2021-08-17 NOTE — PROGRESS NOTES
Internal Medicine Progress Note    Patient's name: Sulma Tovar  : 1945  Chief complaints (on day of admission): Shortness of Breath (recently treated here and discharged for \"too much fluid\". Trouble sleeping and laying flat last night.) and Palpitations  Admission date: 2021  Date of service: 2021   Room: [place] [address]  Primary care physician: Benji Daly DO  Reason for visit: Follow-up for CHF exacerbation    Subjective  Millie Wood was seen and examined morning before stress test. Patient was laying comfortably in bed in no acute distress. She states that she did very well overnight and has no new complaints. She no longer has any shortness of breath or associated chest pain. Patient states that she is doing very well overall. Patient states that she slept very well last night which she was surprised about because she was in the hospital. She is hopeful for discharge pending negative stress test.    Review of Systems  There are no new complaints of chest pain, shortness of breath, abdominal pain, nausea, vomiting, diarrhea, constipation.     Hospital Medications  Current Facility-Administered Medications   Medication Dose Route Frequency Provider Last Rate Last Admin    regadenoson (LEXISCAN) injection 0.4 mg  0.4 mg Intravenous ONCE PRN Sigmund Given, MD        sodium chloride flush 0.9 % injection 5-40 mL  5-40 mL Intravenous 2 times per day Sigmund Given, MD   10 mL at 21 2115    sodium chloride flush 0.9 % injection 5-40 mL  5-40 mL Intravenous PRN Sigmund Given, MD   10 mL at 21 1300    0.9 % sodium chloride infusion  25 mL Intravenous PRN Sigmund Given, MD        ondansetron (ZOFRAN-ODT) disintegrating tablet 4 mg  4 mg Oral Q8H PRN Sigmund Given, MD        Or    [facility]ansetron [facility]) injection 4 mg  4 mg Intravenous Q6H PRN Sigmund Given, MD        acetaminophen (TYLENOL) tablet 650 mg  650 mg Oral Q6H PRN Sigmund Given, MD        Or    acetaminophen (TYLENOL) suppository 650 mg  650 mg Rectal Q6H PRN Ashleigh Dodson MD        polyethylene glycol [facility]) packet 17 g  17 g Oral Daily PRN Ashleigh Dodson MD        baclofen (LIORESAL) tablet 10 mg  10 mg Oral BID Ashleigh Dodson MD   10 mg at 08/16/21 2115    dilTIAZem (CARDIZEM CD) extended release capsule 360 mg  360 mg Oral Daily Ashleigh Dodson MD   360 mg at 08/16/21 1050    metoprolol tartrate (LOPRESSOR) tablet 25 mg  25 mg Oral BID Ashleigh Dodson MD   25 mg at 08/16/21 2115    apixaban (ELIQUIS) tablet 5 mg  5 mg Oral BID Ashleigh Dodson MD   5 mg at 08/16/21 2115    perflutren lipid microspheres (DEFINITY) injection 1.65 mg  1.5 mL Intravenous ONCE PRN Oneita Bolder, APRN - CNP        furosemide (LASIX) injection 40 mg  40 mg Intravenous Daily Oneita Bolder, APRN - CNP   40 mg at 08/16/21 1508    spironolactone (ALDACTONE) tablet 25 mg  25 mg Oral Daily Oneita Bolder, APRN - CNP   25 mg at 08/16/21 1508       PRN Medications  regadenoson, sodium chloride flush, sodium chloride, ondansetron **OR** ondansetron, acetaminophen **OR** acetaminophen, polyethylene glycol, perflutren lipid microspheres    Objective  Most Recent Recorded Vitals  /89   Pulse 90   Temp 98.2 °F (36.8 °C) (Oral)   Resp 18   Ht 5' 6\" (1.676 m)   Wt 239 lb 12.8 oz (108.8 kg)   SpO2 97%   BMI 38.70 kg/m²   I/O last 3 completed shifts: In: 110 [P.O.:110]  Out: 900 [Urine:900]  No intake/output data recorded.     Physical Exam:  General: AAO to person/place/time/purpose, NAD, no labored breathing  Eyes: conjunctivae/corneas clear, sclera non icteric  Skin: color/texture/turgor normal, no rashes or lesions, chronic skin changes in the lower extremities with flaking  Lungs: CTAB, no retractions/use of accessory muscles, no vocal fremitus, no rhonchi, no crackle, no rales  Heart: regular rate, irregular rhythm, no murmur  Abdomen: soft, NT, bowel sounds normal  Extremities: atraumatic, no edema  Neurologic: cranial nerves 2-12 grossly intact, no slurred speech    Most Recent Labs  Lab Results   Component Value Date    WBC 6.9 08/17/2021    HGB 15.0 08/17/2021    HCT 46.7 08/17/2021     08/17/2021     08/17/2021    K 4.8 08/17/2021    CL 99 08/17/2021    CREATININE 1.5 (H) 08/17/2021    BUN 21 08/17/2021    CO2 31 (H) 08/17/2021    GLUCOSE 127 (H) 08/17/2021    ALT 10 08/16/2021    AST 18 08/16/2021    INR 1.1 10/06/2016    TSH 1.670 08/16/2021    LABA1C 6.0 (H) 04/20/2021       XR CHEST (2 VW)   Final Result   No acute process. NM Cardiac Stress Test Nuclear Imaging    (Results Pending)       Echocardiogram    pending    Assessment   Active Hospital Problems    Diagnosis     Exertional dyspnea [R06.00]      Priority: High    Angina pectoris ([address] [I20.9]      Priority: High    History of pulmonary embolism [Z86.711]      Priority: High    Paroxysmal atrial fibrillation (HCC) [I48.0]      Priority: High    History of DVT (deep vein thrombosis) [Z86.718]      Priority: High    GERD (gastroesophageal reflux disease) [K21.9]      Priority: Low    Hypertension [I10]      Priority: Low    Obesity [E66.9]      Priority: Low    Hyperlipidemia [E78.5]      Priority: Low         Plan  · Dyspnea, exertional-- elevated trops: Observation. Trend trops. Telemetry. Stress Test tomorrow 8/17. Cardiology input appreciated -- Lasix 40 daily IV ordered as well as Aldactone 25 daily PO  · Afib, stable: cont eliquis and diltiazem  · Continue home medications  · PT/OT  · Follow labs  · DVT prophylaxis. Continue eliquis   · Please see orders for further management and care. ·  for discharge planning  · Discharge plan: Pending cardiac input     Electronically signed by Sharri Broussard DO, PGY1 on 8/17/2021 at 9:23 AM     I can be reached through [employer]. Addendum: I have personally participated in a face-to-face history and physical exam on the date of service with the patient.  I have discussed the case with the resident. I also participated in medical decision making with the resident on the date of service and I agree with all of the pertinent clinical information unless indicated in my editing of the note. I have reviewed and edited the note above based on my findings during my history, exam, and decision making on the same day of service.      My additional thoughts:   · IV diuresis to be changed to PO tomorrow and potential discharge     Enmanuel Connell MD     Electronically signed by Enmanuel Connell MD on 8/17/2021     I can be reached through [employer].

## 2021-08-17 NOTE — CARE COORDINATION
CASE MANAGEMENT. .. Met with patient at the bedside. Ms Wilbur Jordan resides alone and is independent of her ADL's. Has support from her niece and nephew. Uses a cane. History with [facility] and was in [place] for 1 day. She will return home at discharge. Anticipating tomorrow-pending her progress. Denies any home going needs or HHC. Stress test and echo from today pending. Med adjustments per cardio. Will follow and assist with needs accordingly.

## 2021-08-17 NOTE — PLAN OF CARE
Problem:  Activity:  Goal: Ability to tolerate increased activity will improve  Description: Ability to tolerate increased activity will improve  Outcome: Ongoing  Goal: Expression of feelings of increased energy will increase  Description: Expression of feelings of increased energy will increase  Outcome: Ongoing     Problem: Cardiac:  Goal: Ability to maintain an adequate cardiac output will improve  Description: Ability to maintain an adequate cardiac output will improve  Outcome: Ongoing  Goal: Complications related to the disease process, condition or treatment will be avoided or minimized  Description: Complications related to the disease process, condition or treatment will be avoided or minimized  Outcome: Ongoing     Problem: Safety:  Goal: Ability to remain free from injury will improve  Description: Ability to remain free from injury will improve  Outcome: Met This Shift  Goal: Will show no signs and symptoms of excessive bleeding  Description: Will show no signs and symptoms of excessive bleeding  Outcome: Met This Shift     Problem: Breathing Pattern - Ineffective:  Goal: Ability to achieve and maintain a regular respiratory rate will improve  Description: Ability to achieve and maintain a regular respiratory rate will improve  Outcome: Ongoing

## 2021-08-18 ENCOUNTER — TELEPHONE (OUTPATIENT)
Dept: PRIMARY CARE CLINIC | Age: 76
End: 2021-08-18

## 2021-08-18 VITALS
HEIGHT: 66 IN | WEIGHT: 239 LBS | BODY MASS INDEX: 38.41 KG/M2 | HEART RATE: 54 BPM | OXYGEN SATURATION: 97 % | SYSTOLIC BLOOD PRESSURE: 124 MMHG | RESPIRATION RATE: 18 BRPM | TEMPERATURE: 98.4 F | DIASTOLIC BLOOD PRESSURE: 67 MMHG

## 2021-08-18 LAB
ANION GAP SERPL CALCULATED.3IONS-SCNC: 9 MMOL/L (ref 7–16)
BUN BLDV-MCNC: 19 MG/DL (ref 6–23)
CALCIUM SERPL-MCNC: 9.4 MG/DL (ref 8.6–10.2)
CHLORIDE BLD-SCNC: 103 MMOL/L (ref 98–107)
CO2: 27 MMOL/L (ref 22–29)
CREAT SERPL-MCNC: 1.3 MG/DL (ref 0.5–1)
GFR AFRICAN AMERICAN: 48
GFR NON-AFRICAN AMERICAN: 40 ML/MIN/1.73
GLUCOSE BLD-MCNC: 100 MG/DL (ref 74–99)
POTASSIUM SERPL-SCNC: 5.3 MMOL/L (ref 3.5–5)
SODIUM BLD-SCNC: 139 MMOL/L (ref 132–146)

## 2021-08-18 PROCEDURE — 80048 BASIC METABOLIC PNL TOTAL CA: CPT

## 2021-08-18 PROCEDURE — 96375 TX/PRO/DX INJ NEW DRUG ADDON: CPT

## 2021-08-18 PROCEDURE — 2580000003 HC RX 258: Performed by: STUDENT IN AN ORGANIZED HEALTH CARE EDUCATION/TRAINING PROGRAM

## 2021-08-18 PROCEDURE — 6360000002 HC RX W HCPCS: Performed by: STUDENT IN AN ORGANIZED HEALTH CARE EDUCATION/TRAINING PROGRAM

## 2021-08-18 PROCEDURE — 6370000000 HC RX 637 (ALT 250 FOR IP): Performed by: STUDENT IN AN ORGANIZED HEALTH CARE EDUCATION/TRAINING PROGRAM

## 2021-08-18 PROCEDURE — 36415 COLL VENOUS BLD VENIPUNCTURE: CPT

## 2021-08-18 PROCEDURE — G0378 HOSPITAL OBSERVATION PER HR: HCPCS

## 2021-08-18 PROCEDURE — 6370000000 HC RX 637 (ALT 250 FOR IP): Performed by: INTERNAL MEDICINE

## 2021-08-18 PROCEDURE — APPSS30 APP SPLIT SHARED TIME 16-30 MINUTES: Performed by: NURSE PRACTITIONER

## 2021-08-18 RX ORDER — SPIRONOLACTONE 25 MG/1
25 TABLET ORAL DAILY
Qty: 30 TABLET | Refills: 0 | Status: SHIPPED | OUTPATIENT
Start: 2021-08-18 | End: 2021-08-18 | Stop reason: HOSPADM

## 2021-08-18 RX ORDER — FUROSEMIDE 40 MG/1
40 TABLET ORAL DAILY
Qty: 60 TABLET | Refills: 0 | Status: SHIPPED | OUTPATIENT
Start: 2021-08-18 | End: 2021-10-06 | Stop reason: SDUPTHER

## 2021-08-18 RX ADMIN — SODIUM CHLORIDE, PRESERVATIVE FREE 10 ML: 5 INJECTION INTRAVENOUS at 07:54

## 2021-08-18 RX ADMIN — SPIRONOLACTONE 25 MG: 25 TABLET ORAL at 07:54

## 2021-08-18 RX ADMIN — FUROSEMIDE 40 MG: 40 TABLET ORAL at 10:31

## 2021-08-18 RX ADMIN — METOPROLOL TARTRATE 25 MG: 25 TABLET, FILM COATED ORAL at 07:54

## 2021-08-18 RX ADMIN — ONDANSETRON 4 MG: 2 INJECTION INTRAMUSCULAR; INTRAVENOUS at 04:54

## 2021-08-18 RX ADMIN — DILTIAZEM HYDROCHLORIDE 360 MG: 180 CAPSULE, COATED, EXTENDED RELEASE ORAL at 07:54

## 2021-08-18 RX ADMIN — ACETAMINOPHEN 650 MG: 325 TABLET ORAL at 04:54

## 2021-08-18 RX ADMIN — APIXABAN 5 MG: 5 TABLET, FILM COATED ORAL at 07:54

## 2021-08-18 RX ADMIN — BACLOFEN 10 MG: 10 TABLET ORAL at 07:54

## 2021-08-18 ASSESSMENT — PAIN SCALES - GENERAL: PAINLEVEL_OUTOF10: 4

## 2021-08-18 ASSESSMENT — PAIN DESCRIPTION - LOCATION: LOCATION: BREAST

## 2021-08-18 ASSESSMENT — PAIN DESCRIPTION - FREQUENCY: FREQUENCY: INTERMITTENT

## 2021-08-18 ASSESSMENT — PAIN DESCRIPTION - ORIENTATION: ORIENTATION: LEFT

## 2021-08-18 ASSESSMENT — PAIN DESCRIPTION - PAIN TYPE: TYPE: ACUTE PAIN

## 2021-08-18 ASSESSMENT — PAIN DESCRIPTION - DESCRIPTORS: DESCRIPTORS: DULL

## 2021-08-18 NOTE — PROGRESS NOTES
CLINICAL PHARMACY NOTE: MEDS TO BEDS    Total # of Prescriptions Filled: 0   The following medications were delivered to the patient:  · 0    Additional Documentation:  Pt had lasix at home so she did not want and aldactone was d/c per Jodie Cushing (RN)

## 2021-08-18 NOTE — PLAN OF CARE
Problem: Activity:  Goal: Ability to tolerate increased activity will improve  Description: Ability to tolerate increased activity will improve  8/18/2021 0109 by Reza Pillai RN  Outcome: Met This Shift     Problem:  Activity:  Goal: Expression of feelings of increased energy will increase  Description: Expression of feelings of increased energy will increase  8/18/2021 0109 by Reza Pillai RN  Outcome: Met This Shift

## 2021-08-18 NOTE — PLAN OF CARE
Problem: Activity:  Goal: Ability to tolerate increased activity will improve  Description: Ability to tolerate increased activity will improve  8/18/2021 1112 by Kira Franklin RN  Outcome: Met This Shift     Problem:  Activity:  Goal: Expression of feelings of increased energy will increase  Description: Expression of feelings of increased energy will increase  8/18/2021 1112 by Kira Franklin RN  Outcome: Met This Shift     Problem: Cardiac:  Goal: Ability to maintain an adequate cardiac output will improve  Description: Ability to maintain an adequate cardiac output will improve  Outcome: Met This Shift  Goal: Complications related to the disease process, condition or treatment will be avoided or minimized  Description: Complications related to the disease process, condition or treatment will be avoided or minimized  Outcome: Met This Shift     Problem: Safety:  Goal: Ability to remain free from injury will improve  Description: Ability to remain free from injury will improve  Outcome: Met This Shift  Goal: Will show no signs and symptoms of excessive bleeding  Description: Will show no signs and symptoms of excessive bleeding  Outcome: Met This Shift     Problem: Breathing Pattern - Ineffective:  Goal: Ability to achieve and maintain a regular respiratory rate will improve  Description: Ability to achieve and maintain a regular respiratory rate will improve  Outcome: Met This Shift

## 2021-08-18 NOTE — TELEPHONE ENCOUNTER
----- Message from [facility] sent at 8/18/2021  3:50 PM EDT -----  Subject: Appointment Request    Reason for Call: Routine ED Follow Up Visit    QUESTIONS  Type of Appointment? Established Patient  Reason for appointment request? Available appointments did not meet   patient need  Additional Information for Provider? pt was in emergency room and needs   follow up appt for next week.   ---------------------------------------------------------------------------  --------------  CALL BACK INFO  What is the best way for the office to contact you? OK to leave message on   voicemail  Preferred Call Back Phone Number? [phone]  ---------------------------------------------------------------------------  --------------  SCRIPT ANSWERS  Relationship to Patient? Self  (Patient requests to see provider urgently. )? No  Do you have any questions for your primary care provider that need to be   answered prior to your appointment? No  Have you been diagnosed with, awaiting test results for, or told that you   are suspected of having COVID-19 (Coronavirus)? (If patient has tested   negative or was tested as a requirement for work, school, or travel and   not based on symptoms, answer no)? No  Do you currently have flu-like symptoms including fever or chills, cough,   shortness of breath, difficulty breathing, or new loss of taste or smell? No  Have you had close contact with someone with COVID-19 in the last 14 days? No  (Service Expert  click yes below to proceed with [employer] As Usual   Scheduling)?  Yes

## 2021-08-18 NOTE — PROGRESS NOTES
Discharge paperwork reviewed with patient, she stated she understood. Transport to get patient. Patient leaving in [place].

## 2021-08-18 NOTE — DISCHARGE SUMMARY
Internal Medicine Discharge Summary    NAME: Kristy Jaffe :  1945  MRN:  [id number] PCP:Yvette Foy DO    ADMITTED: 2021   DISCHARGED: 2021  4:11 PM    ADMITTING PHYSICIAN: Sarah att. providers found    PCP: Mehnaz Martínez DO    CONSULTANT(S):   IP CONSULT TO INTERNAL MEDICINE  IP CONSULT TO CARDIOLOGY  IP CONSULT TO IV TEAM  IP CONSULT TO IV TEAM  IP CONSULT TO SOCIAL WORK  IP CONSULT TO CASE MANAGEMENT  IP CONSULT TO IV TEAM     ADMITTING DIAGNOSIS:   Exertional dyspnea [R06.00]  Congestive heart failure, unspecified HF chronicity, unspecified heart failure type ([address] [I50.9]     Please see H&P for further details    DISCHARGE DIAGNOSES:   Active Hospital Problems    Diagnosis     Exertional dyspnea [R06.00]      Priority: High    Angina pectoris ([address] [I20.9]      Priority: High    History of pulmonary embolism [Z86.711]      Priority: High    Paroxysmal atrial fibrillation (HCC) [I48.0]      Priority: High    History of DVT (deep vein thrombosis) [Z86.718]      Priority: High    GERD (gastroesophageal reflux disease) [K21.9]      Priority: Low    Hypertension [I10]      Priority: Low    Obesity [E66.9]      Priority: Low    Hyperlipidemia [E78.5]      Priority: Low       BRIEF HISTORY OF PRESENT ILLNESS: Kristy Jaffe is a 66 y.o. female patient of Mehnaz Martínez DO who  has a past medical history of Acute renal failure syndrome ([address] Chest pain, Chronic pain syndrome, Colon polyps, Degenerative joint disease, Diarrhea, GERD (gastroesophageal reflux disease), History of DVT (deep vein thrombosis), History of pulmonary embolism, Hyperlipidemia, Hypertension, Insomnia, Obesity, Paroxysmal atrial fibrillation ([address] and Syncope and collapse. who originally had concerns including Shortness of Breath (recently treated here and discharged for \"too much fluid\". Trouble sleeping and laying flat last night.) and Palpitations.  at presentation on 2021, and was found to have Exertional dyspnea [R06.00]  Congestive heart failure, unspecified HF chronicity, unspecified heart failure type ([facility] [I50.9] after workup. Please see H&P for further details. HOSPITAL COURSE:   The patient presented to the hospital with the chief complaint of Shortness of Breath (recently treated here and discharged for \"too much fluid\". Trouble sleeping and laying flat last night.) and Palpitations  . The patient was admitted to the hospital    · Dyspnea, exertional-- elevated trops: Observation. Trend trops. Telemetry. Stress Test negative, ECHO WNL cleared by cardio for DC   · Afib, stable: cont eliquis and diltiazem  · Continue home medications  · PT/OT  · Follow labs  · DVT prophylaxis. Continue eliquis   · Please see orders for further management and care      BRIEF PHYSICAL EXAMINATION AND LABORATORIES ON DAY OF DISCHARGE:  VITALS:  /67   Pulse 54   Temp 98.4 °F (36.9 °C) (Oral)   Resp 18   Ht 5' 6\" (1.676 m)   Wt 239 lb (108.4 kg)   SpO2 97%   BMI 38.58 kg/m²       Please see note from the same day.      LABS[de-identified]  Recent Labs     08/16/21  0714 08/17/21  0521 08/18/21  1100    136 139   K 3.8 4.8 5.3*    99 103   CO2 24 31* 27   BUN 17 21 19   CREATININE 1.1* 1.5* 1.3*   GLUCOSE 121* 127* 100*   CALCIUM 9.0 9.7 9.4     Recent Labs     08/16/21  0714   ALKPHOS 84   ALT 10   AST 18   PROT 7.0   BILITOT 0.7   LABALBU 4.0     Recent Labs     08/16/21  0714 08/17/21  0521   WBC 7.4 6.9   RBC 4.56 4.76   HGB 14.4 15.0   HCT 43.2 46.7   MCV 94.7 98.1   MCH 31.6 31.5   MCHC 33.3 32.1   RDW 13.1 13.2    319   MPV 10.7 10.5     Lab Results   Component Value Date    LABA1C 6.0 (H) 04/20/2021    LABA1C 6.0 (H) 11/06/2020    LABA1C 5.7 06/20/2017     Lab Results   Component Value Date    INR 1.1 10/06/2016    INR 1.1 10/01/2016    PROTIME 12.3 10/06/2016    PROTIME 11.9 10/01/2016      Lab Results   Component Value Date    TSH 1.670 08/16/2021    TSH 2.390 04/20/2021    TSH 1.500 11/06/2020     Lab Results   Component Value Date    TRIG 117 04/20/2021    TRIG 114 11/06/2020    TRIG 122 01/16/2020    HDL 34 04/20/2021    HDL 51 11/06/2020    HDL 40 01/16/2020    LDLCALC 95 04/20/2021    LDLCALC 150 (H) 11/06/2020    LDLCALC 130 (H) 01/16/2020     No results for input(s): MG in the last 72 hours. No results for input(s): CKTOTAL, CKMB, TROPONINI in the last 72 hours. No results for input(s): LACTA in the last 72 hours. IMAGING:  Echo Complete    Result Date: 8/18/2021  Transthoracic Echocardiography Report (TTE)  Demographics   Patient Name    Naomi Rendon Gender            Female                  A   Medical Record  [id number]     Room Number       9752  Number   Account #       [de-identified]    Procedure Date    08/17/2021   Corporate ID                 Ordering          Isabel Lane DO                               Physician   Accession       [id number]   Referring         Heladio Ordonez DO  Number                       Physician   Date of Birth   1945   Sonographer       Ivan Avila [facility]   Age             66 year(s)   Interpreting      [address] [place] [address]                               Physician         Physician Cardiology                                                 Rachel Contreras MD                                Any Other  Procedure Type of Study   TTE procedure:Echo Complete W/Doppler & Color Flow. Procedure Date Date: 08/17/2021 Start: 07:18 AM Study Location: Echo Lab Technical Quality: Poor visualization due to body habitus. Indications:Dyspnea on exertion. Patient Status: Routine Contrast Medium: Definity. Height: 66 inches Weight: 235 pounds BSA: 2.14 m^2 BMI: 37.93 kg/m^2 HR: 87 bpm BP: 137/68 mmHg  Findings   Left Ventricle  Normal left ventricular chamber size. Normal left ventricular systolic function, LVEF is 60%. Mild left ventricular concentric hypertrophy noted. Indeterminate diastolic function.    Right Ventricle  Normal right ventricle structure and function. Left Atrium  The left atrium is mildly dilated. Increased left atrial volume. Interatrial septum not well visualized but appears intact. Right Atrium  Normal right atrium. Mitral Valve  Normal mitral valve structure and function. No mitral valve prolapse. Tricuspid Valve  Normal tricuspid valve structure. There is trace tricuspid regurgitation, RVSp 27mmHg. Aortic Valve  Normal aortic valve structure. There is trace aortic regurgitation. Pulmonic Valve  The pulmonic valve was not well visualized. Pericardial Effusion  No evidence of pericardial effusion. Pericardium appears normal.   Pleural Effusion  No evidence of pleural effusion. Aorta  Normal aortic root size and ascending aorta. Miscellaneous  The inferior vena cava diameter is normal with normal respiratory  variation. Conclusions   Summary  Technically sub-optimal images. Underlying A fib  Normal left ventricular chamber size. Normal left ventricular systolic function, LVEF is 60%. Mild left ventricular concentric hypertrophy noted. Indeterminate diastolic function. The left atrium is mildly dilated. Increased left atrial volume. Interatrial septum not well visualized but appears intact. Normal right ventricle structure and function. No mitral valve prolapse. There is trace aortic regurgitation. There is trace tricuspid regurgitation, RVSp 27mmHg. Normal aortic root size. No evidence of pericardial effusion. No intra cardiac mass or thrombus. Compared to prior echo from 10/2016, no significant changes noted.    Signature   ----------------------------------------------------------------  Electronically signed by Jimy Rivera MD(Interpreting  physician) on 08/18/2021 07:54 AM  ----------------------------------------------------------------  M-Mode/2D Measurements & Calculations   LV Diastolic     LV Systolic Dimension: 2.6 cm    LA Dimension: 4.8 cmAO  Dimension: 3.9   LV Volume Diastolic: 57.0 ml     Root Dimension: 3 cm  cm               LV Volume Systolic: 46.6 ml  LV CU:23.4 %     LV EDV/LV EDV Index: 64.8 XG/21  LV PW Diastolic: YK/Q^9LE ESV/LV ESV Index: 25.8  0.8 cm           ml/12ml/ m^2                     RV Diastolic Dimension:  LV PW Systolic:  EF Calculated: 60.2 %            2.8 cm  1.2 cm           LV Mass Index: 45 l/min*m^2  Septum           LV Length: 6.5 cm                LA/Aorta: 1.6  Diastolic: 0.9                                    Ascending Aorta: 3.4 cm  cm               LVOT: 2 cm                       LA volume/Index: 88 ml  Septum Systolic:                                  /41.04ml/m^2  1. 3 cm                                            RA Area: 15.8 cm^2  CO: 4.4 l/min  CI: 2.06 l/m*m^2  LV Mass: 97.36 g  Doppler Measurements & Calculations   MV Peak E-Wave:     AV Peak Velocity: 1.08 LVOT Peak Velocity: 0.87 m/s  0.96 m/s            m/s                    LVOT Mean Velocity: 0.53 m/s                      AV Peak Gradient: 4.66 LVOT Peak Gradient: 3.1  MV Peak Gradient:   mmHg                   mmHgLVOT Mean Gradient: 1.4  3.7 mmHg            AV Mean Velocity: 0.73 mmHg  MV Mean Gradient:   m/s                    Estimated RVSP: 27.1 mmHg  1.2 mmHg            AV Mean Gradient: 2.5  Estimated RAP:3 mmHg  MV Mean Velocity:   mmHg  0.5 m/s             AV VTI: 18.5 cm  MV Deceleration     AV Area                TR Velocity:2.46 m/s  Time: 162.2 msec    (Continuity):2.73 cm^2 TR Gradient:24.11 mmHg  MV P1/2t: 41.8 msec AV Deceleration Time:  PV Peak Velocity: 0.74 m/s  MVA by PHT:5.26     2245.5 msec            PV Peak Gradient: 2.18 mmHg  cm^2                LVOT VTI: 16.1 cm      PV Mean Velocity: 0.51 m/s  MV Area             IVRT: 70.7 msec        PV Mean Gradient: 1.2 mmHg  (continuity): 3.6   Estimated PASP: 27.11  cm^2                mmHg  MV E' Septal  Velocity: 0.06 m/s  MV E' Lateral  Velocity: 12 m/s [email or web address] [id number]    XR CHEST (2 VW)    Result Date: 8/16/2021  EXAMINATION: TWO XRAY VIEWS OF THE CHEST 8/16/2021 6:16 am COMPARISON: 14 August 2021 HISTORY: ORDERING SYSTEM PROVIDED HISTORY: pulm edema TECHNOLOGIST PROVIDED HISTORY: Reason for exam:->pulm edema FINDINGS: The lungs are without acute focal process. There is no effusion or pneumothorax. The cardiomediastinal silhouette is without acute process. The osseous structures are without acute process. No acute process. XR CHEST (2 VW)    Result Date: 8/14/2021  EXAMINATION: TWO XRAY VIEWS OF THE CHEST 8/14/2021 1:56 pm COMPARISON: July 13, 2021 HISTORY: ORDERING SYSTEM PROVIDED HISTORY: r.o chf TECHNOLOGIST PROVIDED HISTORY: Reason for exam:->r.o chf FINDINGS: No airspace opacity or pleural effusion. The heart is normal size. No pneumothorax. No free air beneath the hemidiaphragms. No pneumonia or pleural effusion. NM Cardiac Stress Test Nuclear Imaging    Result Date: 8/17/2021  Indication:  CHF, A. Fib Clinical History:   Patient has no historyof coronary artery disease. IMAGING: Myocardial perfusion imaging was performed at rest 30-35 minutes following the intravenous injection of 10.8 mCi of (Tc-Sestamibi) followed by 10 ml of Normal Saline. At peak exercise, the patient was injected intravenously with 31. 8mCi of (Tc-Sestamibi) followed by 10 ml of Normal Saline. Gated post-stress tomographic imaging was performed 20-25 minutes after stress. FINDINGS: The overall quality of the study was adequate. Left ventricular cavity size was noted to be normal. Rotational analog analysis demonstrated no significant motion artifact The gated SPECT stress imaging in the short, vertical long, and horizontal long axis demonstrated normal homogeneous tracer distribution throughout the myocardium during stress and rest images.

## 2021-08-18 NOTE — PROGRESS NOTES
Inpatient Cardiology Progress note     PATIENT IS BEING FOLLOWED FOR:CHF   A FIB    Rome Salmeron is a 66 y.o. female WHO follows with Dr. Gilmar Rodgers: Denies chest pain and dyspnea  OBJECTIVE: No apparent distress     ROS:  Consist: Denies fevers, chills or night sweats  Heart: as above  Lungs: Denies SOB  GI: Denies abdominal pain, vomiting or diarrhea    PHYSICAL EXAM:   /62   Pulse 75   Temp 97.8 °F (36.6 °C) (Oral)   Resp 18   Ht 5' 6\" (1.676 m)   Wt 239 lb (108.4 kg)   SpO2 95%   BMI 38.58 kg/m²    B/P Range last 24 hours: Systolic (05XOY), QWS:850 , Min:118 , PAP:191    Diastolic (37CJO), QLZ:78, Min:62, Max:75    CONST: Well developed, well nourished who appears stated age. Awake, alert and cooperative. No apparent distress  HEENT:   Head- Normocephalic, atraumatic   Eyes- Conjunctivae pink, anicteric  Throat- Oral mucosa pink and moist  Neck-  No stridor, trachea midline, no jugular venous distention. No carotid bruit  CHEST: Chest symmetrical and non-tender to palpation. No accessory muscle use or intercostal retractions  RESPIRATORY:  Lung sounds - clear throughout fields   CARDIOVASCULAR:     Heart Inspection- shows no noted pulsations  Heart Palpation- no heaves or thrills; PMI is non-displaced   Heart Ausculation- Regular rate and rhythm, no murmur. No s3, s4 or rub   PV: No lower extremity edema. No varicosities. Pedal pulses palpable, no clubbing or cyanosis   ABDOMEN: Soft, non-tender to light palpation. Bowel sounds present. No palpable masses no organomegaly; no abdominal bruit  MS: Good muscle strength and tone. No atrophy or abnormal movements. : Deferred  SKIN: Warm and dry no statis dermatitis or ulcers   NEURO / PSYCH: Oriented to person, place and time. Speech clear and appropriate. Follows all commands.  Pleasant affect       Intake/Output Summary (Last 24 hours) at 8/18/2021 1320  Last data filed at 8/18/2021 1242  Gross per 24 hour   Intake 600 ml   Output -- Net 600 ml       Weight:   Wt Readings from Last 3 Encounters:   08/18/21 239 lb (108.4 kg)   08/14/21 235 lb (106.6 kg)   07/26/21 240 lb (108.9 kg)     Current Inpatient Medications:   furosemide  40 mg Oral Daily    sodium chloride flush  5-40 mL Intravenous 2 times per day    baclofen  10 mg Oral BID    dilTIAZem  360 mg Oral Daily    metoprolol tartrate  25 mg Oral BID    apixaban  5 mg Oral BID       IV Infusions (if any):   sodium chloride         DIAGNOSTIC/ LABORATORY DATA:  Labs:   CBC:   Recent Labs     08/16/21  0714 08/17/21 0521   WBC 7.4 6.9   HGB 14.4 15.0   HCT 43.2 46.7    319     BMP:   Recent Labs     08/17/21  0521 08/18/21  1100    139   K 4.8 5.3*   CO2 31* 27   BUN 21 19   CREATININE 1.5* 1.3*   LABGLOM 34 40   CALCIUM 9.7 9.4     Mag: No results for input(s): MG in the last 72 hours. Phos: No results for input(s): PHOS in the last 72 hours. TFT:   Lab Results   Component Value Date    TSH 1.670 08/16/2021    T4FREE 1.16 08/16/2021      HgA1c:   Lab Results   Component Value Date    LABA1C 6.0 (H) 04/20/2021     No results found for: EAG    BNP: No results for input(s): BNP in the last 72 hours. PT/INR: No results for input(s): PROTIME, INR in the last 72 hours. APTT:No results for input(s): APTT in the last 72 hours.   CARDIAC ENZYMES:  Recent Labs     08/16/21  0714 08/16/21  1400 08/16/21  1945   TROPHS 16* 13* 27*     FASTING LIPID PANEL:  Lab Results   Component Value Date    CHOL 152 04/20/2021    HDL 34 04/20/2021    LDLCALC 95 04/20/2021    TRIG 117 04/20/2021     LIVER PROFILE:  Recent Labs     08/16/21  0714   AST 18   ALT 10   LABALBU 4.0         Stress test 8/17/2021        The myocardial perfusion imaging was normal       Overall left ventricular systolic function was normal, LVEF 65%       Low risk myocardial perfusion study.       Compared to previous study from January 2020 which showed subtle   reversible ischemia of the anteroseptal, anterolateral wall with the   EF 73%             Telemetry: a fib with rate controlled  12 lead EKG:  Echo:8/16/2021  Technically sub-optimal images. Underlying A fib   Normal left ventricular chamber size. Normal left ventricular systolic function, LVEF is 60%. Mild left ventricular concentric hypertrophy noted. Indeterminate diastolic function. The left atrium is mildly dilated. Increased left atrial volume. Interatrial septum not well visualized but appears intact. Normal right ventricle structure and function. No mitral valve prolapse. There is trace aortic regurgitation. There is trace tricuspid regurgitation, RVSp 27mmHg. Normal aortic root size. No evidence of pericardial effusion. No intra cardiac mass or thrombus. Compared to prior echo from 10/2016, no significant changes noted. ASSESSMENT:      Acute CHF-  Better - diastolic, continue po  lasix     Permanent A fib - Rates controlled with BB and Cardizem. On Eliquis for [address]     Hx of PE, DVT - On Eliquis     Essential Hypertension - Controlled     Non morbid obesity     hyperkalemia       PLAN:  1. Stop aldactone  2.  Follow up with Dr. Gary Wynn    Electronically signed by KIMBERLY Sahu - CNP on 8/18/2021 at 1:20 PM

## 2021-08-19 ENCOUNTER — CARE COORDINATION (OUTPATIENT)
Dept: CASE MANAGEMENT | Age: 76
End: 2021-08-19

## 2021-08-19 ENCOUNTER — TELEPHONE (OUTPATIENT)
Dept: CARDIOLOGY CLINIC | Age: 76
End: 2021-08-19

## 2021-08-19 ENCOUNTER — TELEPHONE (OUTPATIENT)
Dept: PRIMARY CARE CLINIC | Age: 76
End: 2021-08-19

## 2021-08-19 DIAGNOSIS — R06.09 EXERTIONAL DYSPNEA: Primary | ICD-10-CM

## 2021-08-19 DIAGNOSIS — M17.12 PRIMARY OSTEOARTHRITIS OF LEFT KNEE: ICD-10-CM

## 2021-08-19 PROCEDURE — 1111F DSCHRG MED/CURRENT MED MERGE: CPT | Performed by: FAMILY MEDICINE

## 2021-08-19 RX ORDER — ACETAMINOPHEN AND CODEINE PHOSPHATE 300; 30 MG/1; MG/1
1 TABLET ORAL 2 TIMES DAILY PRN
Qty: 60 TABLET | Refills: 0 | Status: SHIPPED | OUTPATIENT
Start: 2021-08-19 | End: 2021-10-06 | Stop reason: SDUPTHER

## 2021-08-19 NOTE — TELEPHONE ENCOUNTER
----- Message from Polly Jaeger sent at 8/19/2021  8:55 AM EDT -----  Subject: Appointment Request    Reason for Call: Urgent (Patient Request) Hospital Follow Up    QUESTIONS  Type of Appointment? Established Patient  Reason for appointment request? Available appointments did not meet   patient need  Additional Information for Provider? Has appointment 10/06/21, hospital   follow up needed, Home Phone ([phone])  ---------------------------------------------------------------------------  --------------  [id number]  What is the best way for the office to contact you? OK to leave message on   voicemail  Preferred Call Back Phone Number? [phone]  ---------------------------------------------------------------------------  --------------  SCRIPT ANSWERS  Relationship to Patient? Self  (Patient requests to see provider urgently. )? Yes  (Has the patient been discharged from the hospital within 2 business days   AND does not have a Telephone Encounter  Follow Up From [address] [place] [address]   documented in [address]?)? No  Have you been diagnosed with, awaiting test results for, or told that you   are suspected of having COVID-19 (Coronavirus)? (If patient has tested   negative or was tested as a requirement for work, school, or travel and   not based on symptoms, answer no)? No  Do you currently have flu-like symptoms including fever or chills, cough,   shortness of breath, difficulty breathing, or new loss of taste or smell? No  Have you had close contact with someone with COVID-19 in the last 14 days? No  (Service Expert  click yes below to proceed with [employer] As Usual   Scheduling)?  Yes

## 2021-08-19 NOTE — TELEPHONE ENCOUNTER
----- Message -----   From: KIMBERLY Cohen - ZHANNA   Sent: 8/18/2021   1:32 PM EDT   To: Eduardo Gleason   Subject: Inpatient Notes                                   Please send to Dr. Jonatan Jackson nurse for follow up one month

## 2021-08-19 NOTE — TELEPHONE ENCOUNTER
----- Message from Kory Echevarria sent at 8/19/2021  8:53 AM EDT -----  Subject: Refill Request    QUESTIONS  Name of Medication? acetaminophen-codeine (TYLENOL/CODEINE #3) 300-30 MG   per tablet  Patient-reported dosage and instructions? needs refill   How many days do you have left? 0  [facility] phone number (if available)? [phone]  ---------------------------------------------------------------------------  --------------,  Name of Medication? acetaminophen-codeine (TYLENOL/CODEINE #3) 300-30 MG   per tablet  Patient-reported dosage and instructions? needs refill   How many days do you have left? 0  [facility] phone number (if available)? [phone]  ---------------------------------------------------------------------------  --------------,  Name of Medication? furosemide (LASIX) 40 MG tablet  Patient-reported dosage and instructions? changes from 20 mg 40 mg   How many days do you have left? 0  Preferred Pharmacy? [facility] phone number (if available)? [phone]  ---------------------------------------------------------------------------  --------------  CALL BACK INFO  What is the best way for the office to contact you? OK to leave message on   [facility]  Preferred Call Back Phone Number?  [phone] Subjective   HPI Comments: Rema Lozano is a 21 y.o.female who presents to the ED with c/o a fever. Pt was dx with cellulitis 1 week ago after a probable bug bite to her right upper thigh. She has had worsening redness and pain and was seen at [facility] 5 days ago and started on Keflex and Bactrim. She continued to have worsening and was seen here 3 days ago and given IV rocephin and vanc and told to finish her bactrim and keflex. She states that she has since had no improvement in her cellulitis and that for the past 1-2 days, she has been having fevers, chills and cold sweats at night. Today she checked her fever at 101.2 prompting her to come to the ED for evaluation. She has finished her bactrim and has 1 more dose of keflex.   Pt is currently on her menses.       Patient is a 21 y.o. female presenting with fever.   History provided by:  Patient  Fever   Max temp prior to arrival:  101.2  Temp source:  Oral  Severity:  Moderate  Onset quality:  Gradual  Duration: dx with cellulitis for past week, fevers past 2 days.  Timing:  Constant  Progression:  Worsening  Chronicity:  New  Relieved by:  Nothing  Worsened by:  Nothing  Associated symptoms: chills and rash    Associated symptoms: no chest pain, no confusion, no cough, no diarrhea, no nausea and no vomiting        Review of Systems   Constitutional: Positive for chills, diaphoresis and fever.   Respiratory: Negative for cough and shortness of breath.    Cardiovascular: Negative for chest pain.   Gastrointestinal: Negative for diarrhea, nausea and vomiting.   Skin: Positive for color change and rash.   Psychiatric/Behavioral: Negative for confusion.   All other systems reviewed and are negative.      History reviewed. No pertinent past medical history.    No Known Allergies    History reviewed. No pertinent surgical history.    History reviewed. No pertinent family history.    Social History     Social History   • Marital status: Single     Spouse name: N/A    • Number of children: N/A   • Years of education: N/A     Social History Main Topics   • Smoking status: Never Smoker   • Smokeless tobacco: Never Used   • Alcohol use Yes      Comment: ocasionally   • Drug use: No   • Sexual activity: Defer     Other Topics Concern   • None     Social History Narrative    single         Objective   Physical Exam   Constitutional: She is oriented to person, place, and time. She appears well-developed and well-nourished. No distress.   HENT:   Head: Normocephalic and atraumatic.   Eyes: Conjunctivae are normal. No scleral icterus.   Neck: Normal range of motion. Neck supple.   Cardiovascular: Regular rhythm and normal heart sounds.  Tachycardia present.    Pulmonary/Chest: Effort normal and breath sounds normal. No respiratory distress.   Abdominal: Soft. There is no tenderness.   Musculoskeletal: Normal range of motion. She exhibits no edema.   Lymphadenopathy:     She has no cervical adenopathy.   Neurological: She is alert and oriented to person, place, and time.   Skin: Skin is warm and dry. No rash noted. She is not diaphoretic. There is erythema.   5 inch x 2 inch ovoid blotchy erythema, no streaking, no tenderness, no drainable fluid collection.    Psychiatric: She has a normal mood and affect. Her behavior is normal.   Nursing note and vitals reviewed.      Procedures         ED Course  ED Course     Pt showed me pictures of her wound on previous days.  It looked more like cellulitis then than it does now and it clearly has improved.  Her fever is concerning especially since there is nothing to suggest worsening cellulitis - no lymphangitis, fluctuance, drainage or spreading.  Labs show low WBC count.  I consulted Dr Yang and discussed the case.  He felt pt has drug fever and bone marrow suppression secondary to Bactrim use.  He recommends cessation of all abx and he will see her in the office tomorrow to evaluate further treatment.  Pt did already receive IV clindamycin  here tonight.  Discussed with her, she prefers to stop all meds and will go to ID tomorrow.                MDM  Number of Diagnoses or Management Options  Cellulitis of right thigh:   Drug induced fever:   Leukopenia, unspecified type:      Amount and/or Complexity of Data Reviewed  Clinical lab tests: ordered and reviewed  Decide to obtain previous medical records or to obtain history from someone other than the patient: yes  Discuss the patient with other providers: yes        Final diagnoses:   Cellulitis of right thigh   Drug induced fever   Leukopenia, unspecified type       Documentation assistance provided by elias Preston.  Information recorded by the elias was done at my direction and has been verified and validated by me.     Sudarshan Preston  06/27/17 2003       Porfirio Apple MD  06/27/17 0577

## 2021-08-19 NOTE — TELEPHONE ENCOUNTER
Spoke to pt, she states that she does not feel that she needs an appt, she feels fine. She just wants to make sure that you know that her Lasix was changed from 20 mg -40 mg. Her med list has been updated.

## 2021-08-19 NOTE — TELEPHONE ENCOUNTER
pt was in emergency room and needs   follow up appt for next week.      Please advise where you want appt scheduled

## 2021-08-19 NOTE — CARE COORDINATION
[facility] Transitions Initial Follow Up Call    Call within 2 business days of discharge: Yes    Patient: Harpreet Almendarez Patient : 1945   MRN: [id number]  Reason for Admission: Shortness of Breath; Palpitations  Discharge Date: 21 RARS: No data recorded    Last Discharge [facility]       Complaint Diagnosis Description Type Department Provider    21 Shortness of Breath; Palpitations Exertional dyspnea . .. ED to Hosp-Admission (Discharged) (ADMITTED) BRANDON Zimmerman MD; Scar Vaughan MD        Transitions of Care Initial Call    Was this an external facility discharge? No Discharge Facility: [address] [place] [address]    Challenges to be reviewed by the provider   Additional needs identified to be addressed with provider: No         Method of communication with provider : chart routing      Advance Care Planning:   Does patient have an Advance Directive: decision maker updated. Was this a readmission? No  Patient stated reason for admission: shortness of breath, palpitations  Patients top risk factors for readmission: medical condition    Care Transition Nurse (CTN) contacted the patient by telephone to perform post hospital discharge assessment. Verified name and  with patient as identifiers. Provided introduction to self, and explanation of the CTN role. CTN reviewed discharge instructions, medical action plan and red flags with patient who verbalized understanding. Patient given an opportunity to ask questions and does not have any further questions or concerns at this time. Were discharge instructions available to patient? Yes. Reviewed appropriate site of care based on symptoms and resources available to patient including: PCP and Specialist. The patient agrees to contact the PCP office for questions related to their healthcare. Medication reconciliation was performed with patient, who verbalizes understanding of administration of home medications.    [id number] entered         Care Transitions 24 Hour Call    Do you have any ongoing symptoms?: No  Do you have a copy of your discharge instructions?: Yes  Do you have all of your prescriptions and are they filled?: Yes  Have you been contacted by a Attributor [address]?: No  Have you scheduled your follow up appointment?: No  Were you discharged with any Home Care or Post Acute Services: No  Do you feel like you have everything you need to keep you well at home?: Yes  Care Transitions Interventions; CTN spoke with Taylor at Dr. Angélica Paiz office. Discussed no appointment availability on schedule and office staff is working to schedule patient for a hospital follow up appointment in a timely manner  Staff will notify patient with appointment date/time. CTN notified patient of same. Patient is pleasant in conversation and reports she is doing well.   -denies any C/O chest pain, palpitations, shortness of breath, lightheaded, or dizziness   -denies any swelling or weight gain   -follows Low Sodium diet   -normal bladder/bowel elimination   -independent with ambulation; no falls  -taking prescribed medications as ordered    Emotional support provided.      PLAN NEXT CALL;  Verify f/u appointment with PCP    Follow Up  Future Appointments   Date Time Provider Sara Faye   9/3/2021  2:40 PM Olivia Hand MD [facility]   10/6/2021  9:45 AM DO MICHELLE Mendez RN

## 2021-08-23 ENCOUNTER — CARE COORDINATION (OUTPATIENT)
Dept: CASE MANAGEMENT | Age: 76
End: 2021-08-23

## 2021-08-23 NOTE — CARE COORDINATION
[facility] Transitions Follow Up Call    2021    Patient: Puneet Liao  Patient : 1945   MRN: [id number]  Reason for Admission: Shortness of breath, palpitations  Discharge Date: 21 RARS: No data recorded       Spoke with: Patient, Thad Fernandez Transitions Subsequent and Final Call    Subsequent and Final Calls  Do you have any ongoing symptoms?: No  Do you have any questions related to your medications?: No  Do you currently have any active services?: No  Do you have any needs or concerns that I can assist you with?: No  Identified Barriers: None  Care Transitions Interventions  No Identified Needs    Patient is pleasant in conversation and reports she is doing well.   -denies any C/O chest pain, palpitations, shortness of breath, lightheaded, or dizziness   -denies any swelling or weight gain   -follows Low Sodium diet   -normal bladder/bowel elimination   -taking prescribed medications as ordered  -voices no needs or concerns at this time     Emotional support provided; discussed will continue to follow.       Follow Up  Future Appointments   Date Time Provider Sara Faye   2021 10:40 AM MD [facility]   10/6/2021  9:45 AM DO MICHELLE Rizzo RN

## 2021-08-31 ENCOUNTER — CARE COORDINATION (OUTPATIENT)
Dept: CASE MANAGEMENT | Age: 76
End: 2021-08-31

## 2021-08-31 NOTE — CARE COORDINATION
[facility] Transitions Follow Up Call    2021    Patient: Harpreet Almendarez  Patient : 1945   MRN: [id number]  Reason for Admission:   Discharge Date: 21 RARS: No data recorded       Spoke with: Patient, Yasir Garcia Transitions Subsequent and Final Call    Subsequent and Final Calls  Do you have any ongoing symptoms?: No  Do you have any questions related to your medications?: No  Do you have any needs or concerns that I can assist you with?: No  Identified Barriers: None  Care Transitions Interventions  No Identified Needs    Patient is pleasant in conversation and reports she is doing well.    -Patient reports she just returned home following exercise regimen at the [facility]  -denies any C/O chest pain, palpitations, shortness of breath, lightheaded, or dizziness   -denies any swelling or weight gain   -voices no needs or concerns at this time     Emotional support provided; discussed final call.          Follow Up  Future Appointments   Date Time Provider Sara Faye   2021 10:40 AM MD [facility]   10/6/2021  9:45 AM DO MICHELLE Fang, [place]

## 2021-09-22 ENCOUNTER — OFFICE VISIT (OUTPATIENT)
Dept: CARDIOLOGY CLINIC | Age: 76
End: 2021-09-22
Payer: MEDICARE

## 2021-09-22 VITALS
RESPIRATION RATE: 18 BRPM | BODY MASS INDEX: 37.19 KG/M2 | HEIGHT: 66 IN | DIASTOLIC BLOOD PRESSURE: 70 MMHG | WEIGHT: 231.4 LBS | HEART RATE: 73 BPM | SYSTOLIC BLOOD PRESSURE: 120 MMHG

## 2021-09-22 DIAGNOSIS — I50.32 CHRONIC HEART FAILURE WITH PRESERVED EJECTION FRACTION (HCC): ICD-10-CM

## 2021-09-22 DIAGNOSIS — I48.0 PAROXYSMAL ATRIAL FIBRILLATION (HCC): Primary | ICD-10-CM

## 2021-09-22 LAB
ANION GAP SERPL CALCULATED.3IONS-SCNC: 13 MMOL/L (ref 7–16)
BUN BLDV-MCNC: 19 MG/DL (ref 6–23)
CALCIUM SERPL-MCNC: 9.9 MG/DL (ref 8.6–10.2)
CHLORIDE BLD-SCNC: 104 MMOL/L (ref 98–107)
CO2: 27 MMOL/L (ref 22–29)
CREAT SERPL-MCNC: 1.3 MG/DL (ref 0.5–1)
GFR AFRICAN AMERICAN: 48
GFR NON-AFRICAN AMERICAN: 40 ML/MIN/1.73
GLUCOSE BLD-MCNC: 118 MG/DL (ref 74–99)
POTASSIUM SERPL-SCNC: 4.4 MMOL/L (ref 3.5–5)
SODIUM BLD-SCNC: 144 MMOL/L (ref 132–146)

## 2021-09-22 PROCEDURE — 93000 ELECTROCARDIOGRAM COMPLETE: CPT | Performed by: INTERNAL MEDICINE

## 2021-09-22 PROCEDURE — 99214 OFFICE O/P EST MOD 30 MIN: CPT | Performed by: INTERNAL MEDICINE

## 2021-09-22 NOTE — PATIENT INSTRUCTIONS
· We will continue Cardizem  mg p.o. daily and metoprolol tartrate 25 mg p.o. twice daily for rate control and Eliquis 5 mg p.o. twice daily for anticoagulation. · Continue Lasix 40 mg daily for HFpEF and leg edema. · Will check BMP today. · She is otherwise stable from a cardiology standpoint. · She will follow-up with me in 3 months.

## 2021-09-22 NOTE — PROGRESS NOTES
OUTPATIENT CARDIOLOGY FOLLOW-UP    Name: Nakul Castillo    Age: 66 y.o. Primary Care Physician: Heladio Ordonez DO    Date of Service: 9/22/2021    Chief Complaint:   Chief Complaint   Patient presents with    Atrial Fibrillation     hf/u- pt has complaints of sob       Interim History:   Mrs. Ramon England is a 68-year-old  female with past history of permanent atrial fibrillation, hypertension, hyperlipidemia, severe obesity presented to the hospital recently on 8/16/2021 with increased dyspnea and lower extremity edema. She was seen as an initial consult on 1/16/2021 for nonanginal chest pain and was diagnosed with a permanent atrial fibrillation, hypertension, hyperlipidemia, GERD and nonanginal chest pain she had a Lexiscan nuclear stress test which came back negative for ischemia. During her last hospitalization August 2021 she had a acute on chronic HFpEF and underwent a Lexiscan nuclear stress test which again came back negative for ischemia and an echocardiogram done showed an EF of 60% with indeterminate diastolic function trace AI and trace TR. Patient was discharged home on 8/16/2021 after IV diuresis. Now she is feeling much better denies any chest pain or dyspnea. She did experience occasional palpitations at least once in a month and had a short run of palpitations this morning and resolved spontaneously. Since she was discharged from the hospital, he has not had any ER visits or hospitalizations. He is compliant with medications, as well as salt and fluid intake. He does not take any over-the-counter arthritis medications. No new cardiac complaints since last cardiology evaluation. She denies recent chest pain, SOB, palpitations, lightheadedness, dizziness, syncope, PND, or orthopnea. Atrial fibrillation on EKG.     Review of Systems:   Cardiac: As per HPI  General: No fever, chills  Pulmonary: As per HPI  HEENT: No visual disturbances, difficult swallowing  GI: No nausea, vomiting  Endocrine: No thyroid disease or DM  Musculoskeletal: SANDERS x 4, no focal motor deficits  Skin: Intact, no rashes  Neuro/Psych: No headache or seizures    Past Medical History:  Past Medical History:   Diagnosis Date    Acute renal failure syndrome ([address] 10/1/16  10/07/2016    2ry severe dehydration     Chest pain 01/15/2020    R/O ACS- normal stress    Chronic pain syndrome     Colon polyps     Microscopic colitis    Degenerative joint disease     Involving multiple joints    Diarrhea     2ry C-Diff 10/1/2016    GERD (gastroesophageal reflux disease)     History of DVT (deep vein thrombosis)     History of pulmonary embolism     following knee surgery    Hyperlipidemia     Hypertension     Insomnia     denies as of 1/15/20    Obesity     Paroxysmal atrial fibrillation (HCC)     Syncope and collapse        Past Surgical History:  Past Surgical History:   Procedure Laterality Date    APPENDECTOMY      CARDIOVASCULAR STRESS TEST  01/16/2020    Normal    CHOLECYSTECTOMY      COLONOSCOPY  10/07/2016    JOINT REPLACEMENT Right 2012    TKA       Family History:  Family History   Problem Relation Age of Onset    Pancreatic Cancer Brother     Diabetes type 2  Brother     Diabetes type 2  Mother     Other Father         AAA    Diabetes type 2  Sister     Pancreatic Cancer Brother        Social History:  Social History     Socioeconomic History    Marital status: Single     Spouse name: Not on file    Number of children: Not on file    Years of education: Not on file    Highest education level: Not on file   Occupational History    Not on file   Tobacco Use    Smoking status: Never Smoker    Smokeless tobacco: Never Used   Vaping Use    Vaping Use: Never used   Substance and Sexual Activity    Alcohol use: No    Drug use: No    Sexual activity: Not on file   Other Topics Concern    Not on file   Social History Narrative    Not on file     Social Determinants of Health Financial Resource Strain:     Difficulty of Paying Living Expenses:    Food Insecurity:     Worried About Running Out of Food in the Last Year:     [address] in the Last Year:    Transportation Needs:     Lack of Transportation (Medical):  Lack of Transportation (Non-Medical):    Physical Activity:     Days of Exercise per Week:     Minutes of Exercise per Session:    Stress:     Feeling of Stress :    Social Connections:     Frequency of Communication with Friends and Family:     Frequency of Social Gatherings with Friends and Family:     Attends Rastafari Services:     Active Member of Clubs or Organizations:     Attends Club or Organization Meetings:     Marital Status:    Intimate Partner Violence:     Fear of Current or Ex-Partner:     Emotionally Abused:     Physically Abused:     Sexually Abused: Allergies: Allergies   Allergen Reactions    Penicillins Hives       Current Medications:  Current Outpatient Medications   Medication Sig Dispense Refill    furosemide (LASIX) 40 MG tablet Take 1 tablet by mouth daily 60 tablet 0    vitamin D (ERGOCALCIFEROL) 1.25 MG (11649 UT) CAPS capsule Take 50,000 Units by mouth Twice a Week Monday and Thursday      dilTIAZem (TIAZAC) 360 MG extended release capsule Take 1 capsule by mouth daily 30 capsule 5    ELIQUIS 5 MG TABS tablet TAKE ONE TABLET BY MOUTH 2 TIMES A DAY 60 tablet 5    metoprolol tartrate (LOPRESSOR) 25 MG tablet Take 1 tablet by mouth 2 times daily 180 tablet 1    calcium carbonate (TUMS) 500 MG chewable tablet Take 2 tablets by mouth 2 times daily as needed for Heartburn OTC       No current facility-administered medications for this visit.        Physical Exam:  /70   Pulse 73   Resp 18   Ht 5' 6\" (1.676 m)   Wt 231 lb 6.4 oz (105 kg)   BMI 37.35 kg/m²   Wt Readings from Last 3 Encounters:   09/22/21 231 lb 6.4 oz (105 kg)   08/18/21 239 lb (108.4 kg)   08/14/21 235 lb (106.6 kg)     Appearance: Awake, alert and oriented x 3, no acute respiratory distress  Skin: Intact, no rash  Head: Normocephalic, atraumatic  Eyes: EOMI, no conjunctival erythema  ENMT: No pharyngeal erythema, MMM, no rhinorrhea  Neck: Supple, no elevated JVP, no carotid bruits  Lungs: Clear to auscultation bilaterally. No wheezes, rales, or rhonchi.   Cardiac: Irregularly irregular rate and rhythm, +S1S2, no murmurs apparent  Abdomen: Soft, nontender, +bowel sounds  Extremities: Moves all extremities x 4, no lower extremity edema  Neurologic: No focal motor deficits apparent, normal mood and affect, alert and oriented x 3  Peripheral Pulses: Intact posterior tibial pulses bilaterally    Laboratory Tests:  Lab Results   Component Value Date    CREATININE 1.3 (H) 08/18/2021    BUN 19 08/18/2021     08/18/2021    K 5.3 (H) 08/18/2021     08/18/2021    CO2 27 08/18/2021     Lab Results   Component Value Date    MG 1.9 10/06/2016     Lab Results   Component Value Date    WBC 6.9 08/17/2021    HGB 15.0 08/17/2021    HCT 46.7 08/17/2021    MCV 98.1 08/17/2021     08/17/2021     Lab Results   Component Value Date    ALT 10 08/16/2021    AST 18 08/16/2021    ALKPHOS 84 08/16/2021    BILITOT 0.7 08/16/2021     Lab Results   Component Value Date    CKTOTAL 112 10/01/2016    CKMB 4.4 (H) 10/01/2016    TROPONINI <0.01 01/16/2020    TROPONINI <0.01 01/15/2020    TROPONINI <0.01 01/15/2020     Lab Results   Component Value Date    INR 1.1 10/06/2016    INR 1.1 10/01/2016    PROTIME 12.3 10/06/2016    PROTIME 11.9 10/01/2016     Lab Results   Component Value Date    TSH 1.670 08/16/2021     Lab Results   Component Value Date    LABA1C 6.0 (H) 04/20/2021     No results found for: EAG  Lab Results   Component Value Date    CHOL 152 04/20/2021    CHOL 224 (H) 11/06/2020    CHOL 194 01/16/2020     Lab Results   Component Value Date    TRIG 117 04/20/2021    TRIG 114 11/06/2020    TRIG 122 01/16/2020     Lab Results   Component Value Date calculate the score:      Age: 66 years      Sex: Female      Is Non- : No      Diabetic: No      Tobacco smoker: No      Systolic Blood Pressure: 392 mmHg      Is BP treated: Yes      HDL Cholesterol: 34 mg/dL      Total Cholesterol: 152 mg/dL        ASSESSMENT:  · Chronic HFpEF, euvolemic and hemodynamically stable  · Permanent atrial fibrillation with controlled rate on Eliquis for anticoagulation, intermittent palpitations  · Hypertension, well controlled  · Hyperlipidemia  · GERD. ·  History of postop PE following total knee replacement    Plan:   · We will continue Cardizem  mg p.o. daily and metoprolol tartrate 25 mg p.o. twice daily for rate control and Eliquis 5 mg p.o. twice daily for anticoagulation. · Continue Lasix 40 mg daily for HFpEF and leg edema. · Will check BMP today. · She is otherwise stable from the cardiology standpoint. · She will follow-up with me in 3 months. The patient's current medication list, allergies, problem list and results of all previously ordered testing were reviewed at today's visit.   Joel Morrissey MD  [facility]) Cardiology

## 2021-09-23 ENCOUNTER — TELEPHONE (OUTPATIENT)
Dept: CARDIOLOGY CLINIC | Age: 76
End: 2021-09-23

## 2021-09-23 NOTE — TELEPHONE ENCOUNTER
----- Message from Lluvia Mckeon MD sent at 9/23/2021  1:04 PM EDT -----  Her kidney functions are stable. Continue current dose of Lasix 40 mg p.o. daily and follow-up with me as scheduled in 3 months.

## 2021-10-06 ENCOUNTER — OFFICE VISIT (OUTPATIENT)
Dept: PRIMARY CARE CLINIC | Age: 76
End: 2021-10-06
Payer: MEDICARE

## 2021-10-06 VITALS
OXYGEN SATURATION: 98 % | BODY MASS INDEX: 37.12 KG/M2 | HEIGHT: 66 IN | DIASTOLIC BLOOD PRESSURE: 60 MMHG | TEMPERATURE: 96.6 F | HEART RATE: 57 BPM | SYSTOLIC BLOOD PRESSURE: 110 MMHG | WEIGHT: 231 LBS

## 2021-10-06 DIAGNOSIS — E55.9 VITAMIN D INSUFFICIENCY: ICD-10-CM

## 2021-10-06 DIAGNOSIS — Z23 NEED FOR INFLUENZA VACCINATION: ICD-10-CM

## 2021-10-06 DIAGNOSIS — I50.32 CHRONIC HEART FAILURE WITH PRESERVED EJECTION FRACTION (HCC): ICD-10-CM

## 2021-10-06 DIAGNOSIS — E78.2 MIXED HYPERLIPIDEMIA: ICD-10-CM

## 2021-10-06 DIAGNOSIS — I48.0 PAROXYSMAL ATRIAL FIBRILLATION (HCC): Primary | ICD-10-CM

## 2021-10-06 DIAGNOSIS — I10 PRIMARY HYPERTENSION: ICD-10-CM

## 2021-10-06 DIAGNOSIS — M17.12 PRIMARY OSTEOARTHRITIS OF LEFT KNEE: ICD-10-CM

## 2021-10-06 DIAGNOSIS — R73.01 IMPAIRED FASTING BLOOD SUGAR: ICD-10-CM

## 2021-10-06 PROBLEM — I20.9 ANGINA PECTORIS (HCC): Status: RESOLVED | Noted: 2021-08-16 | Resolved: 2021-10-06

## 2021-10-06 PROBLEM — R06.09 EXERTIONAL DYSPNEA: Status: RESOLVED | Noted: 2021-08-16 | Resolved: 2021-10-06

## 2021-10-06 PROCEDURE — 99214 OFFICE O/P EST MOD 30 MIN: CPT | Performed by: FAMILY MEDICINE

## 2021-10-06 PROCEDURE — 90674 CCIIV4 VAC NO PRSV 0.5 ML IM: CPT | Performed by: FAMILY MEDICINE

## 2021-10-06 PROCEDURE — G0008 ADMIN INFLUENZA VIRUS VAC: HCPCS | Performed by: FAMILY MEDICINE

## 2021-10-06 RX ORDER — ACETAMINOPHEN AND CODEINE PHOSPHATE 300; 30 MG/1; MG/1
1 TABLET ORAL 2 TIMES DAILY PRN
Qty: 60 TABLET | Refills: 0 | Status: CANCELLED | OUTPATIENT
Start: 2021-11-03 | End: 2021-12-03

## 2021-10-06 RX ORDER — ACETAMINOPHEN AND CODEINE PHOSPHATE 300; 30 MG/1; MG/1
1 TABLET ORAL 2 TIMES DAILY PRN
Qty: 60 TABLET | Refills: 0 | Status: CANCELLED | OUTPATIENT
Start: 2021-12-01 | End: 2021-12-31

## 2021-10-06 RX ORDER — ACETAMINOPHEN AND CODEINE PHOSPHATE 300; 30 MG/1; MG/1
1 TABLET ORAL 2 TIMES DAILY PRN
Qty: 60 TABLET | Refills: 2 | Status: SHIPPED
Start: 2021-10-06 | End: 2022-02-01 | Stop reason: SDUPTHER

## 2021-10-06 RX ORDER — FUROSEMIDE 40 MG/1
40 TABLET ORAL DAILY
Qty: 90 TABLET | Refills: 1 | Status: SHIPPED
Start: 2021-10-06 | End: 2022-04-14 | Stop reason: SDUPTHER

## 2021-10-06 ASSESSMENT — ENCOUNTER SYMPTOMS
COUGH: 0
WHEEZING: 0
SHORTNESS OF BREATH: 0
VOMITING: 0
NAUSEA: 0
CONSTIPATION: 0
ABDOMINAL PAIN: 0
BACK PAIN: 1
DIARRHEA: 0

## 2021-10-06 NOTE — PROGRESS NOTES
10/6/21  Flakita Estrella : 1945 Sex: female  Age: 64 y.o. Chief Complaint   Patient presents with    Atrial Fibrillation     HPI:  64 y.o. female presents today for 3 month(s) follow up of chronic medical conditions, medication refills and FBW. Patient's chart, medical, surgical and medication history all reviewed. Atrial Fibrillation  Patient has atrial fibrillation. Previously seen by Dr. Nevaeh Leavitt, now follows with Dr. Meño Zamora- appt in September  Rate controlled on Metoprolol and Diltiazem. Current rhythm: irregular. Anticoagulated on Eliquis   Known history of atrial fibrillation: yes  Valvular disease: No  Associated symptoms: none  Previous cardioversion and/or ablation: no  History of CAD: No  History of sleep apnea: No  History of ETOH abuse/drug abuse: No  History of thyroid disease: No    Hyperlipidemia  The 10-year ASCVD risk score (Erika Martinez, et al., 2013) is: 20.6%    Values used to calculate the score:      Age: 64 years      Sex: Female      Is Non- : No      Diabetic: No      Tobacco smoker: No      Systolic Blood Pressure: 970 mmHg      Is BP treated: Yes      HDL Cholesterol: 34 mg/dL      Total Cholesterol: 152 mg/dL    Knee Pain  Patient presents with knee pain involving bilateral knees. Onset of the symptoms was several years ago. Inciting event: this is a longstanding problem which has been getting worse.      Current symptoms include crepitus sensation, pain located medially, stiffness and swelling. Pain is aggravated by any weight bearing, going up and down stairs, rising after sitting, squatting and walking. Patient has had prior knee problems.      Evaluation to date: plain films: OA and Ortho consult: patient sees Dr. Alyse Ramirez.   Has been told that she needs to have the L knee replaced, but hasn't gone through with it yet     Treatment to date: avoidance of offending activity, corticosteroid injection which was effective and rest.    She has now joint disease     Involving multiple joints    Diarrhea     2ry C-Diff 10/1/2016    GERD (gastroesophageal reflux disease)     History of DVT (deep vein thrombosis)     History of pulmonary embolism     following knee surgery    Hyperlipidemia     Hypertension     Insomnia     denies as of 1/15/20    Obesity     Paroxysmal atrial fibrillation (HCC)     Syncope and collapse      Past Surgical History:   Procedure Laterality Date    APPENDECTOMY      CARDIOVASCULAR STRESS TEST  01/16/2020    Normal    CHOLECYSTECTOMY      COLONOSCOPY  10/07/2016    JOINT REPLACEMENT Right 2012    TKA     Family History   Problem Relation Age of Onset    Pancreatic Cancer Brother     Diabetes type 2  Brother     Diabetes type 2  Mother     Other Father         AAA    Diabetes type 2  Sister     Pancreatic Cancer Brother      Social History     Socioeconomic History    Marital status: Single     Spouse name: Not on file    Number of children: Not on file    Years of education: Not on file    Highest education level: Not on file   Occupational History    Not on file   Tobacco Use    Smoking status: Never Smoker    Smokeless tobacco: Never Used   Vaping Use    Vaping Use: Never used   Substance and Sexual Activity    Alcohol use: No    Drug use: No    Sexual activity: Not on file   Other Topics Concern    Not on file   Social History Narrative    Not on file     Social Determinants of Health     Financial Resource Strain:     Difficulty of Paying Living Expenses:    Food Insecurity:     Worried About Running Out of Food in the Last Year:     Ran Out of Food in the Last Year:    Transportation Needs:     Lack of Transportation (Medical):      Lack of Transportation (Non-Medical):    Physical Activity:     Days of Exercise per Week:     Minutes of Exercise per Session:    Stress:     Feeling of Stress :    Social Connections:     Frequency of Communication with Friends and Family:     Frequency of Social Gatherings with Friends and Family:     Attends Congregational Services:     Active Member of Clubs or Organizations:     Attends Club or Organization Meetings:     Marital Status:    Intimate Partner Violence:     Fear of Current or Ex-Partner:     Emotionally Abused:     Physically Abused:     Sexually Abused:        Vitals:    10/06/21 0957   BP: 110/60   Pulse: 57   Temp: 96.6 °F (35.9 °C)   SpO2: 98%   Weight: 231 lb (104.8 kg)   Height: 5' 6\" (1.676 m)       Physical Exam:  Physical Exam  Vitals and nursing note reviewed. Constitutional:       General: She is not in acute distress. Appearance: Normal appearance. She is well-developed. She is obese. She is not ill-appearing. HENT:      Head: Normocephalic and atraumatic. Right Ear: Hearing and external ear normal.      Left Ear: Hearing and external ear normal.      Nose:      Comments: Wearing mask  Eyes:      General: Lids are normal. No scleral icterus. Extraocular Movements: Extraocular movements intact. Conjunctiva/sclera: Conjunctivae normal.   Neck:      Thyroid: No thyromegaly. Cardiovascular:      Rate and Rhythm: Normal rate. Rhythm irregular. Heart sounds: Normal heart sounds. No murmur heard. Pulmonary:      Effort: Pulmonary effort is normal. No respiratory distress. Breath sounds: Normal breath sounds. No wheezing. Musculoskeletal:         General: No tenderness or deformity. Normal range of motion. Cervical back: Normal range of motion and neck supple. No muscular tenderness. Right lower leg: No edema. Left lower leg: No edema. Lymphadenopathy:      Cervical: No cervical adenopathy. Skin:     General: Skin is warm and dry. Findings: No rash. Neurological:      General: No focal deficit present. Mental Status: She is alert and oriented to person, place, and time.       Gait: Gait normal.   Psychiatric:         Mood and Affect: Mood and affect normal.         Speech: Speech normal.         Behavior: Behavior normal.         Thought Content: Thought content normal.         Labs:  CBC with Differential:    Lab Results   Component Value Date    WBC 6.9 08/17/2021    RBC 4.76 08/17/2021    HGB 15.0 08/17/2021    HCT 46.7 08/17/2021     08/17/2021    MCV 98.1 08/17/2021    MCH 31.5 08/17/2021    MCHC 32.1 08/17/2021    RDW 13.2 08/17/2021    BANDSPCT 4 10/01/2016    LYMPHOPCT 17.4 08/16/2021    MONOPCT 7.9 08/16/2021    BASOPCT 0.7 08/16/2021    MONOSABS 0.58 08/16/2021    LYMPHSABS 1.28 08/16/2021    EOSABS 0.06 08/16/2021    BASOSABS 0.05 08/16/2021     CMP:    Lab Results   Component Value Date     09/22/2021    K 4.4 09/22/2021    K 4.8 08/17/2021     09/22/2021    CO2 27 09/22/2021    BUN 19 09/22/2021    CREATININE 1.3 09/22/2021    GFRAA 48 09/22/2021    LABGLOM 40 09/22/2021    GLUCOSE 118 09/22/2021    PROT 7.0 08/16/2021    LABALBU 4.0 08/16/2021    CALCIUM 9.9 09/22/2021    BILITOT 0.7 08/16/2021    ALKPHOS 84 08/16/2021    AST 18 08/16/2021    ALT 10 08/16/2021     HgBA1c:    Lab Results   Component Value Date    LABA1C 6.0 04/20/2021     FLP:    Lab Results   Component Value Date    TRIG 117 04/20/2021    HDL 34 04/20/2021    LDLCALC 95 04/20/2021    LABVLDL 23 04/20/2021     TSH:    Lab Results   Component Value Date    TSH 1.670 08/16/2021        Assessment and Plan:  Katy Bermeo was seen today for atrial fibrillation. Diagnoses and all orders for this visit:    Paroxysmal atrial fibrillation (HCC)  -     furosemide (LASIX) 40 MG tablet; Take 1 tablet by mouth daily  Stable at this time. Follows with Dr. Stacy Reyes    Chronic heart failure with preserved ejection fraction (HCC)  Stable    Primary hypertension  -     CBC Auto Differential; Future  -     Comprehensive Metabolic Panel; Future  -     Lipid Panel; Future  -     TSH without Reflex; Future  -     Vitamin B12 & Folate; Future  -     Urinalysis; Future  Well controlled.   Repeat labs in 6 months. Mixed hyperlipidemia  -     Lipid Panel; Future    Primary osteoarthritis of left knee  -     acetaminophen-codeine (TYLENOL/CODEINE #3) 300-30 MG per tablet; Take 1 tablet by mouth 2 times daily as needed for Pain for up to 30 days. Need for influenza vaccination  -     INFLUENZA, MDCK QUADV, 2 YRS AND OLDER, IM, PF, PREFILL SYR OR SDV, 0.5ML (FLUCELVAX QUADV, PF)    Impaired fasting blood sugar  -     Hemoglobin A1C; Future    Vitamin D insufficiency  -     Vitamin D 25 Hydroxy; Future          Return in about 6 months (around 4/6/2022), or if symptoms worsen or fail to improve, for AWV.       Seen By:  Heena Lucas, DO

## 2021-12-16 ENCOUNTER — TELEPHONE (OUTPATIENT)
Dept: PRIMARY CARE CLINIC | Age: 76
End: 2021-12-16

## 2021-12-16 RX ORDER — BACLOFEN 10 MG/1
10 TABLET ORAL 3 TIMES DAILY
Qty: 90 TABLET | Refills: 1 | Status: SHIPPED
Start: 2021-12-16 | End: 2022-04-14

## 2021-12-16 NOTE — TELEPHONE ENCOUNTER
Pt c/o reached for something yesterday and felt something pull In her back and asking if you can give her a muscle relaxer

## 2022-01-13 DIAGNOSIS — I48.0 PAROXYSMAL ATRIAL FIBRILLATION (HCC): ICD-10-CM

## 2022-01-13 RX ORDER — ERGOCALCIFEROL 1.25 MG/1
50000 CAPSULE ORAL
Qty: 24 CAPSULE | Refills: 1 | Status: SHIPPED
Start: 2022-01-13 | End: 2022-06-30 | Stop reason: SDUPTHER

## 2022-01-27 ENCOUNTER — OFFICE VISIT (OUTPATIENT)
Dept: CARDIOLOGY CLINIC | Age: 77
End: 2022-01-27
Payer: MEDICARE

## 2022-01-27 VITALS
RESPIRATION RATE: 18 BRPM | WEIGHT: 220.4 LBS | BODY MASS INDEX: 35.42 KG/M2 | DIASTOLIC BLOOD PRESSURE: 70 MMHG | HEIGHT: 66 IN | HEART RATE: 68 BPM | SYSTOLIC BLOOD PRESSURE: 116 MMHG

## 2022-01-27 DIAGNOSIS — I48.0 PAROXYSMAL ATRIAL FIBRILLATION (HCC): Primary | ICD-10-CM

## 2022-01-27 PROCEDURE — 93000 ELECTROCARDIOGRAM COMPLETE: CPT | Performed by: INTERNAL MEDICINE

## 2022-01-27 PROCEDURE — 99214 OFFICE O/P EST MOD 30 MIN: CPT | Performed by: INTERNAL MEDICINE

## 2022-01-27 NOTE — PATIENT INSTRUCTIONS
· Continue Cardizem  mg p.o. daily and metoprolol tartrate 25 mg p.o. twice daily for rate control and Eliquis 5 mg p.o. twice daily for anticoagulation. · Continue Lasix 40 mg daily for HFpEF and leg edema. · She is otherwise stable from the cardiology standpoint. · She will follow-up with me in 6 months.

## 2022-01-27 NOTE — PROGRESS NOTES
OUTPATIENT CARDIOLOGY FOLLOW-UP    Name: Amna Pinto    Age: 64 y.o. Primary Care Physician: Luiza Rizvi DO    Date of Service: 1/27/2022    Chief Complaint:   Chief Complaint   Patient presents with    Atrial Fibrillation     Patient has no complaints. Interim History:   Mrs. Ryan De La Cruz is a 70-year-old  female with past history of permanent atrial fibrillation, hypertension, hyperlipidemia, severe obesity presented to the hospital recently on 8/16/2021 with increased dyspnea and lower extremity edema. She was seen as an initial consult on 1/16/2021 for nonanginal chest pain and was diagnosed with a permanent atrial fibrillation, hypertension, hyperlipidemia, GERD and nonanginal chest pain she had a Lexiscan nuclear stress test which came back negative for ischemia. During her last hospitalization August 2021 she had a acute on chronic HFpEF and underwent a Lexiscan nuclear stress test which again came back negative for ischemia and an echocardiogram done showed an EF of 60% with indeterminate diastolic function trace AI and trace TR. Patient was discharged home on 8/16/2021 after IV diuresis. Now she is feeling much better denies any chest pain or dyspnea. She did experience occasional palpitations at least once in a month and had a short run of palpitations this morning and resolved spontaneously. Since she was discharged from the hospital, he has not had any ER visits or hospitalizations. He is compliant with medications, as well as salt and fluid intake. He does not take any over-the-counter arthritis medications. She denies any bleeding complications including black or bloody stools. She exercises on a regular basis at [facility] without any cardiac symptoms. She told me that she lost about 20 lbs. She was seen in the office on 9/12/2021, since her last visit, she has not had any further hospitalizations or ER visits.  She underwent cataract surgeries as an outpatient without any perioperative cardiac events. No new cardiac complaints since last cardiology evaluation. She denies recent chest pain, SOB, palpitations, lightheadedness, dizziness, syncope, PND, or orthopnea. Atrial fibrillation on EKG.     Review of Systems:   Cardiac: As per HPI  General: No fever, chills  Pulmonary: As per HPI  HEENT: No visual disturbances, difficult swallowing  GI: No nausea, vomiting  Endocrine: No thyroid disease or DM  Musculoskeletal: SANDERS x 4, no focal motor deficits  Skin: Intact, no rashes  Neuro/Psych: No headache or seizures    Past Medical History:  Past Medical History:   Diagnosis Date    Acute renal failure syndrome ([facility] [address] 10/1/16  10/07/2016    2ry severe dehydration     Chest pain 01/15/2020    R/O ACS- normal stress    Chronic pain syndrome     Colon polyps     Microscopic colitis    Degenerative joint disease     Involving multiple joints    Diarrhea     2ry C-Diff 10/1/2016    GERD (gastroesophageal reflux disease)     History of DVT (deep vein thrombosis)     History of pulmonary embolism     following knee surgery    Hyperlipidemia     Hypertension     Insomnia     denies as of 1/15/20    Obesity     Paroxysmal atrial fibrillation (HCC)     Syncope and collapse        Past Surgical History:  Past Surgical History:   Procedure Laterality Date    APPENDECTOMY      CARDIOVASCULAR STRESS TEST  01/16/2020    Normal    CHOLECYSTECTOMY      COLONOSCOPY  10/07/2016    JOINT REPLACEMENT Right 2012    TKA       Family History:  Family History   Problem Relation Age of Onset    Pancreatic Cancer Brother     Diabetes type 2  Brother     Diabetes type 2  Mother     Other Father         AAA    Diabetes type 2  Sister     Pancreatic Cancer Brother        Social History:  Social History     Socioeconomic History    Marital status: Single     Spouse name: Not on file    Number of children: Not on file    Years of education: Not on file    Highest education level: Not on file Occupational History    Not on file   Tobacco Use    Smoking status: Never Smoker    Smokeless tobacco: Never Used   Vaping Use    Vaping Use: Never used   Substance and Sexual Activity    Alcohol use: No    Drug use: No    Sexual activity: Not on file   Other Topics Concern    Not on file   Social History Narrative    Not on file     Social Determinants of Health     Financial Resource Strain:     Difficulty of Paying Living Expenses: Not on file   Food Insecurity:     Worried About Running Out of Food in the Last Year: Not on file    Narcisa of Food in the Last Year: Not on file   Transportation Needs:     Lack of Transportation (Medical): Not on file    Lack of Transportation (Non-Medical): Not on file   Physical Activity:     Days of Exercise per Week: Not on file    Minutes of Exercise per Session: Not on file   Stress:     Feeling of Stress : Not on file   Social Connections:     Frequency of Communication with Friends and Family: Not on file    Frequency of Social Gatherings with Friends and Family: Not on file    Attends [employer]ism Services: Not on file    Active Member of [address] [place] [address] or Organizations: Not on file    Attends Club or Organization Meetings: Not on file    Marital Status: Not on file   Intimate Partner Violence:     Fear of Current or Ex-Partner: Not on file    Emotionally Abused: Not on file    Physically Abused: Not on file    Sexually Abused: Not on file   Housing Stability:     Unable to Pay for Housing in the Last Year: Not on file    Number of [place] in the Last Year: Not on file    Unstable Housing in the Last Year: Not on file       Allergies:   Allergies   Allergen Reactions    Penicillins Hives       Current Medications:  Current Outpatient Medications   Medication Sig Dispense Refill    Acetaminophen (TYLENOL PO) Take by mouth as needed      vitamin D (ERGOCALCIFEROL) 1.25 MG (08485 UT) CAPS capsule Take 1 capsule by mouth Twice a Week Monday and Thursday 24 capsule 1    metoprolol tartrate (LOPRESSOR) 25 MG tablet Take 1 tablet by mouth 2 times daily 180 tablet 1    dilTIAZem (TIAZAC) 360 MG extended release capsule TAKE ONE CAPSULE BY MOUTH EVERY DAY 90 capsule 1    ELIQUIS 5 MG TABS tablet TAKE ONE TABLET BY MOUTH 2 TIMES A DAY 60 tablet 5    baclofen (LIORESAL) 10 MG tablet Take 1 tablet by mouth 3 times daily 90 tablet 1    furosemide (LASIX) 40 MG tablet Take 1 tablet by mouth daily 90 tablet 1    calcium carbonate (TUMS) 500 MG chewable tablet Take 2 tablets by mouth 2 times daily as needed for Heartburn OTC       No current facility-administered medications for this visit. Physical Exam:  /70   Pulse 68   Resp 18   Ht 5' 6\" (1.676 m)   Wt 220 lb 6.4 oz (100 kg)   BMI 35.57 kg/m²   Wt Readings from Last 3 Encounters:   01/27/22 220 lb 6.4 oz (100 kg)   10/06/21 231 lb (104.8 kg)   09/22/21 231 lb 6.4 oz (105 kg)     Appearance: Awake, alert and oriented x 3, no acute respiratory distress  Skin: Intact, no rash  Head: Normocephalic, atraumatic  Eyes: EOMI, no conjunctival erythema  ENMT: No pharyngeal erythema, MMM, no rhinorrhea  Neck: Supple, no elevated JVP, no carotid bruits  Lungs: Clear to auscultation bilaterally. No wheezes, rales, or rhonchi.   Cardiac: Irregularly irregular rate and rhythm, +S1S2, no murmurs apparent  Abdomen: Soft, nontender, +bowel sounds  Extremities: Moves all extremities x 4, no lower extremity edema  Neurologic: No focal motor deficits apparent, normal mood and affect, alert and oriented x 3  Peripheral Pulses: Intact posterior tibial pulses bilaterally    Laboratory Tests:  Lab Results   Component Value Date    CREATININE 1.3 (H) 09/22/2021    BUN 19 09/22/2021     09/22/2021    K 4.4 09/22/2021     09/22/2021    CO2 27 09/22/2021     Lab Results   Component Value Date    MG 1.9 10/06/2016     Lab Results   Component Value Date    WBC 6.9 08/17/2021    HGB 15.0 08/17/2021    HCT 46.7 08/17/2021    MCV 98.1 08/17/2021     08/17/2021     Lab Results   Component Value Date    ALT 10 08/16/2021    AST 18 08/16/2021    ALKPHOS 84 08/16/2021    BILITOT 0.7 08/16/2021     Lab Results   Component Value Date    CKTOTAL 112 10/01/2016    CKMB 4.4 (H) 10/01/2016    TROPONINI <0.01 01/16/2020    TROPONINI <0.01 01/15/2020    TROPONINI <0.01 01/15/2020     Lab Results   Component Value Date    INR 1.1 10/06/2016    INR 1.1 10/01/2016    PROTIME 12.3 10/06/2016    PROTIME 11.9 10/01/2016     Lab Results   Component Value Date    TSH 1.670 08/16/2021     Lab Results   Component Value Date    LABA1C 6.0 (H) 04/20/2021     No results found for: EAG  Lab Results   Component Value Date    CHOL 152 04/20/2021    CHOL 224 (H) 11/06/2020    CHOL 194 01/16/2020     Lab Results   Component Value Date    TRIG 117 04/20/2021    TRIG 114 11/06/2020    TRIG 122 01/16/2020     Lab Results   Component Value Date    HDL 34 04/20/2021    HDL 51 11/06/2020    HDL 40 01/16/2020     Lab Results   Component Value Date    LDLCALC 95 04/20/2021    LDLCALC 150 (H) 11/06/2020    LDLCALC 130 (H) 01/16/2020     Lab Results   Component Value Date    LABVLDL 23 04/20/2021    LABVLDL 23 11/06/2020    LABVLDL 24 01/16/2020     No results found for: CHOLHDLRATIO    Cardiac Tests:  ECG: Atrial fibrillation with controlled rate, abnormal EKG. no changes compared to prior EKG      Echocardiogram 10/13/2016:   Summary:   Normal left ventricular ejection fraction.   Ejection fraction is visually estimated at 60-70%.   Normal sized left atrium.   Atrial fibrillation   Structurally normal mitral valve.   he aortic valve is trileaflet.   The aortic valve appears mildly sclerotic.   No pulmonary hypertension   No evidence of pericardial effusion. TTE-8/16/2021  Technically sub-optimal images. Underlying A fib  Normal left ventricular chamber size. Normal left ventricular systolic function, LVEF is 60%.   Mild left ventricular concentric hypertrophy noted. Indeterminate diastolic function. The left atrium is mildly dilated. Increased left atrial volume. Interatrial septum not well visualized but appears intact. Normal right ventricle structure and function. No mitral valve prolapse. There is trace aortic regurgitation. There is trace tricuspid regurgitation, RVSp 27mmHg. Normal aortic root size. No evidence of pericardial effusion. No intra cardiac mass or thrombus. Compared to prior echo from 10/2016, no significant changes noted. 1. Abnormal nuclear stress (2006, [address]) followed by unremarkable cardiac cath per patient report. 2. Stress (1/2020) subtle reversible ischemia at the apicoseptal and apicolateral walls  3. Scan nuclear stress test-8/16/2021: LVEF 65%, normal myocardial perfusion imaging without ischemia or infarct, normal wall motion. Cardiac catheterization:     The 10-year ASCVD risk score (Rosmery Gramajo, et al., 2013) is: 22.6%    Values used to calculate the score:      Age: 64 years      Sex: Female      Is Non- : No      Diabetic: No      Tobacco smoker: No      Systolic Blood Pressure: 231 mmHg      Is BP treated: Yes      HDL Cholesterol: 34 mg/dL      Total Cholesterol: 152 mg/dL        ASSESSMENT:  · Chronic HFpEF, euvolemic and hemodynamically stable  · Permanent atrial fibrillation with controlled rate on Eliquis for anticoagulation, intermittent palpitations, not symptomatic. · [id number] Vasc score-4  · Hypertension, well controlled  · Hyperlipidemia  · GERD. ·  History of postop PE following total knee replacement    Plan:   · Continue Cardizem  mg p.o. daily and metoprolol tartrate 25 mg p.o. twice daily for rate control and Eliquis 5 mg p.o. twice daily for anticoagulation. · Continue Lasix 40 mg daily for HFpEF and leg edema. · She is otherwise stable from the cardiology standpoint. · She will follow-up with me in 6 months.     The patient's current medication list, allergies, problem list and results of all previously ordered testing were reviewed at today's visit.   Magdi Barbosa MD  [facility]) Cardiology

## 2022-02-01 DIAGNOSIS — I48.0 PAROXYSMAL ATRIAL FIBRILLATION (HCC): ICD-10-CM

## 2022-02-01 DIAGNOSIS — M17.12 PRIMARY OSTEOARTHRITIS OF LEFT KNEE: ICD-10-CM

## 2022-02-01 RX ORDER — ACETAMINOPHEN AND CODEINE PHOSPHATE 300; 30 MG/1; MG/1
1 TABLET ORAL 2 TIMES DAILY PRN
Qty: 60 TABLET | Refills: 2 | Status: SHIPPED
Start: 2022-02-01 | End: 2022-03-03

## 2022-02-21 ENCOUNTER — CARE COORDINATION (OUTPATIENT)
Dept: CARE COORDINATION | Age: 77
End: 2022-02-21

## 2022-04-14 ENCOUNTER — OFFICE VISIT (OUTPATIENT)
Dept: PRIMARY CARE CLINIC | Age: 77
End: 2022-04-14
Payer: MEDICARE

## 2022-04-14 VITALS
HEIGHT: 66 IN | DIASTOLIC BLOOD PRESSURE: 68 MMHG | WEIGHT: 217 LBS | SYSTOLIC BLOOD PRESSURE: 114 MMHG | TEMPERATURE: 97.2 F | OXYGEN SATURATION: 95 % | BODY MASS INDEX: 34.87 KG/M2 | HEART RATE: 73 BPM

## 2022-04-14 DIAGNOSIS — Z00.00 MEDICARE ANNUAL WELLNESS VISIT, SUBSEQUENT: Primary | ICD-10-CM

## 2022-04-14 DIAGNOSIS — I48.0 PAROXYSMAL ATRIAL FIBRILLATION (HCC): ICD-10-CM

## 2022-04-14 DIAGNOSIS — M15.9 GENERALIZED OSTEOARTHRITIS: ICD-10-CM

## 2022-04-14 PROCEDURE — G0439 PPPS, SUBSEQ VISIT: HCPCS | Performed by: FAMILY MEDICINE

## 2022-04-14 RX ORDER — FUROSEMIDE 40 MG/1
40 TABLET ORAL DAILY
Qty: 90 TABLET | Refills: 1 | Status: SHIPPED
Start: 2022-04-14 | End: 2022-09-22 | Stop reason: SDUPTHER

## 2022-04-14 RX ORDER — ACETAMINOPHEN AND CODEINE PHOSPHATE 300; 30 MG/1; MG/1
1 TABLET ORAL EVERY 8 HOURS PRN
Qty: 90 TABLET | Refills: 5 | Status: SHIPPED
Start: 2022-04-14 | End: 2022-05-25

## 2022-04-14 ASSESSMENT — PATIENT HEALTH QUESTIONNAIRE - PHQ9
2. FEELING DOWN, DEPRESSED OR HOPELESS: 0
SUM OF ALL RESPONSES TO PHQ QUESTIONS 1-9: 0
SUM OF ALL RESPONSES TO PHQ9 QUESTIONS 1 & 2: 0
1. LITTLE INTEREST OR PLEASURE IN DOING THINGS: 0
SUM OF ALL RESPONSES TO PHQ QUESTIONS 1-9: 0

## 2022-04-14 ASSESSMENT — LIFESTYLE VARIABLES
HOW MANY STANDARD DRINKS CONTAINING ALCOHOL DO YOU HAVE ON A TYPICAL DAY: 1 OR 2
HOW OFTEN DO YOU HAVE A DRINK CONTAINING ALCOHOL: NEVER

## 2022-04-14 NOTE — PATIENT INSTRUCTIONS
Personalized Preventive Plan for Eloy Esquivel - 4/14/2022  Medicare offers a range of preventive health benefits. Some of the tests and screenings are paid in full while other may be subject to a deductible, co-insurance, and/or copay. Some of these benefits include a comprehensive review of your medical history including lifestyle, illnesses that may run in your family, and various assessments and screenings as appropriate. After reviewing your medical record and screening and assessments performed today your provider may have ordered immunizations, labs, imaging, and/or referrals for you. A list of these orders (if applicable) as well as your Preventive Care list are included within your After Visit Summary for your review. Other Preventive Recommendations:    · A preventive eye exam performed by an eye specialist is recommended every 1-2 years to screen for glaucoma; cataracts, macular degeneration, and other eye disorders. · A preventive dental visit is recommended every 6 months. · Try to get at least 150 minutes of exercise per week or 10,000 steps per day on a pedometer . · Order or download the FREE \"Exercise & Physical Activity: Your Everyday Guide\" from [employer]. Call 3-575.510.9406 or search [employer] online. · You need 7570-0549 mg of calcium and 2380-6395 IU of vitamin D per day. It is possible to meet your calcium requirement with diet alone, but a vitamin D supplement is usually necessary to meet this goal.  · When exposed to the sun, use a sunscreen that protects against both UVA and UVB radiation with an SPF of 30 or greater. Reapply every 2 to 3 hours or after sweating, drying off with a towel, or swimming. · Always wear a seat belt when traveling in a car. Always wear a helmet when riding a bicycle or motorcycle.

## 2022-04-14 NOTE — PROGRESS NOTES
Medicare Annual Wellness Visit    Ailyn Pabon is here for Medicare AWV, Back Pain (present for a few months), and Abdominal Pain (upset stomach)    Assessment & Plan       Jono Constantino was seen today for medicare awv, back pain and abdominal pain. Diagnoses and all orders for this visit:    Medicare annual wellness visit, subsequent  HRA reviewed and addressed. UTD on HM. Due for fasting labs. Paroxysmal atrial fibrillation (HCC)  -     furosemide (LASIX) 40 MG tablet; Take 1 tablet by mouth daily  Stable. Rate controlled. Generalized osteoarthritis  -     acetaminophen-codeine (TYLENOL/CODEINE #3) 300-30 MG per tablet; Take 1 tablet by mouth every 8 hours as needed for Pain for up to 30 days. Intended supply: 30 days. Take lowest dose possible to manage pain  OARRS reviewed in detail. No signs of abuse or diversion. Recommendations for Preventive Services Due: see orders and patient instructions/AVS.  Recommended screening schedule for the next 5-10 years is provided to the patient in written form: see Patient Instructions/AVS.     Return in about 6 months (around 10/14/2022), or if symptoms worsen or fail to improve, for Chronic medical conditions. Subjective   The following acute and/or chronic problems were also addressed today:    Chronic joint and back pain. Worsening since working outside in the yard. Had episode of nausea/vomiting in February. Thinks its related to eating too much cream cheese. Patient's complete Health Risk Assessment and screening values have been reviewed and are found in Flowsheets. The following problems were reviewed today and where indicated follow up appointments were made and/or referrals ordered.     Positive Risk Factor Screenings with Interventions:               General Health and ACP:  General  In general, how would you say your health is?: Fair  In the past 7 days, have you experienced any of the following: New or Increased Pain, New or Increased Fatigue, Loneliness, Social Isolation, Stress or Anger?: No  Do you get the social and emotional support that you need?: Yes  Do you have a Living Will?: Yes    Advance Directives     Power of Gely Porter Will ACP-Advance Directive ACP-Power of     Not on File Not on File Not on File Not on File      General Health Risk Interventions:  · Patient doing well overall. Due for labs. Health Habits/Nutrition:     Physical Activity: Sufficiently Active    Days of Exercise per Week: 5 days    Minutes of Exercise per Session: 120 min     Have you lost any weight without trying in the past 3 months?: No  Body mass index: (!) 35.02  Have you seen the dentist within the past year?: (!) No    Health Habits/Nutrition Interventions:  · Dental exam overdue:  patient encouraged to make appointment with his/her dentist             Objective   Vitals:    04/14/22 1123   BP: 114/68   Pulse: 73   Temp: 97.2 °F (36.2 °C)   SpO2: 95%   Weight: 217 lb (98.4 kg)   Height: 5' 6\" (1.676 m)      Body mass index is 35.02 kg/m². Physical Exam  Vitals and nursing note reviewed. Constitutional:       General: She is not in acute distress. Appearance: Normal appearance. She is well-developed. She is obese. She is not ill-appearing. HENT:      Head: Normocephalic and atraumatic. Right Ear: Hearing and external ear normal.      Left Ear: Hearing and external ear normal.      Nose:      Comments: Wearing mask  Eyes:      General: Lids are normal. No scleral icterus. Extraocular Movements: Extraocular movements intact. Conjunctiva/sclera: Conjunctivae normal.   Neck:      Thyroid: No thyromegaly. Vascular: No carotid bruit. Cardiovascular:      Rate and Rhythm: Normal rate. Rhythm irregular. Heart sounds: Normal heart sounds. No murmur heard. Pulmonary:      Effort: Pulmonary effort is normal. No respiratory distress. Breath sounds: Normal breath sounds. No wheezing. Musculoskeletal:         General: No tenderness or deformity. Normal range of motion. Cervical back: Normal range of motion and neck supple. No muscular tenderness. Right lower leg: No edema. Left lower leg: No edema. Lymphadenopathy:      Cervical: No cervical adenopathy. Skin:     General: Skin is warm and dry. Findings: No rash. Neurological:      General: No focal deficit present. Mental Status: She is alert and oriented to person, place, and time. Gait: Gait normal.   Psychiatric:         Mood and Affect: Mood and affect normal.         Speech: Speech normal.         Behavior: Behavior normal.         Thought Content: Thought content normal.              Allergies   Allergen Reactions    Penicillins Hives     Prior to Visit Medications    Medication Sig Taking? Authorizing Provider   furosemide (LASIX) 40 MG tablet Take 1 tablet by mouth daily Yes Yvette Foy DO   acetaminophen-codeine (TYLENOL/CODEINE #3) 300-30 MG per tablet Take 1 tablet by mouth every 8 hours as needed for Pain for up to 30 days. Intended supply: 30 days.  Take lowest dose possible to manage pain Yes Yvette Foy DO   Acetaminophen (TYLENOL PO) Take by mouth as needed Yes Historical Provider, MD   vitamin D (ERGOCALCIFEROL) 1.25 MG (03137 UT) CAPS capsule Take 1 capsule by mouth Twice a Week Monday and Thursday Yes Noah Bartholomew, DO   metoprolol tartrate (LOPRESSOR) 25 MG tablet Take 1 tablet by mouth 2 times daily Yes Yvette Foy DO   dilTIAZem (TIAZAC) 360 MG extended release capsule TAKE ONE CAPSULE BY MOUTH EVERY DAY Yes Yvette Foy DO   ELIQUIS 5 MG TABS tablet TAKE ONE TABLET BY MOUTH 2 TIMES A DAY Yes Yvette Foy DO   calcium carbonate (TUMS) 500 MG chewable tablet Take 2 tablets by mouth 2 times daily as needed for Heartburn OTC Yes Historical Provider, MD Condon (Including outside providers/suppliers regularly involved in providing care):   Patient Care Team:  Lauryn De La Paz DO as PCP - General (Family Medicine)  Lauryn De La Paz DO as PCP - [facility] Empaneled Provider    Reviewed and updated this visit:  Tobacco  Allergies  Meds  Problems  Med Hx  Surg Hx  Soc Hx  Fam Hx

## 2022-05-18 ENCOUNTER — TELEPHONE (OUTPATIENT)
Dept: ADMINISTRATIVE | Age: 77
End: 2022-05-18

## 2022-05-25 ENCOUNTER — OFFICE VISIT (OUTPATIENT)
Dept: PRIMARY CARE CLINIC | Age: 77
End: 2022-05-25
Payer: MEDICARE

## 2022-05-25 VITALS
HEIGHT: 66 IN | TEMPERATURE: 96.9 F | HEART RATE: 56 BPM | OXYGEN SATURATION: 96 % | WEIGHT: 212.2 LBS | BODY MASS INDEX: 34.1 KG/M2 | DIASTOLIC BLOOD PRESSURE: 62 MMHG | SYSTOLIC BLOOD PRESSURE: 110 MMHG

## 2022-05-25 DIAGNOSIS — I73.9 PVD (PERIPHERAL VASCULAR DISEASE) (HCC): Primary | ICD-10-CM

## 2022-05-25 DIAGNOSIS — I50.32 CHRONIC HEART FAILURE WITH PRESERVED EJECTION FRACTION (HCC): ICD-10-CM

## 2022-05-25 DIAGNOSIS — M15.9 GENERALIZED OSTEOARTHRITIS: ICD-10-CM

## 2022-05-25 PROCEDURE — 1123F ACP DISCUSS/DSCN MKR DOCD: CPT | Performed by: FAMILY MEDICINE

## 2022-05-25 PROCEDURE — 99213 OFFICE O/P EST LOW 20 MIN: CPT | Performed by: FAMILY MEDICINE

## 2022-05-25 RX ORDER — ACETAMINOPHEN AND CODEINE PHOSPHATE 300; 30 MG/1; MG/1
1 TABLET ORAL EVERY 8 HOURS PRN
Qty: 90 TABLET | Refills: 5 | Status: CANCELLED | OUTPATIENT
Start: 2022-05-25 | End: 2022-06-24

## 2022-05-25 RX ORDER — ACETAMINOPHEN AND CODEINE PHOSPHATE 300; 30 MG/1; MG/1
1 TABLET ORAL EVERY 8 HOURS PRN
Qty: 90 TABLET | Refills: 4 | Status: SHIPPED | OUTPATIENT
Start: 2022-05-25 | End: 2022-06-24

## 2022-05-25 ASSESSMENT — PATIENT HEALTH QUESTIONNAIRE - PHQ9
SUM OF ALL RESPONSES TO PHQ QUESTIONS 1-9: 0
1. LITTLE INTEREST OR PLEASURE IN DOING THINGS: 0
SUM OF ALL RESPONSES TO PHQ9 QUESTIONS 1 & 2: 0
SUM OF ALL RESPONSES TO PHQ QUESTIONS 1-9: 0
2. FEELING DOWN, DEPRESSED OR HOPELESS: 0
SUM OF ALL RESPONSES TO PHQ QUESTIONS 1-9: 0
SUM OF ALL RESPONSES TO PHQ QUESTIONS 1-9: 0

## 2022-05-25 ASSESSMENT — ENCOUNTER SYMPTOMS
WHEEZING: 0
ABDOMINAL PAIN: 0
BACK PAIN: 1
VOMITING: 0
CONSTIPATION: 0
SHORTNESS OF BREATH: 0
DIARRHEA: 0
NAUSEA: 0
COUGH: 0

## 2022-05-25 NOTE — PROGRESS NOTES
22  Konrad Gasca : 1945 Sex: female  Age: 64 y.o. Chief Complaint   Patient presents with    Leg Swelling     left lower leg. swelling comes and goes. she states no swelling is present today     HPI:  64 y.o. female presents today for acute visit due to leg swelling and medication refill. Patient's chart, medical, surgical and medication history all reviewed. Patient has noticed issues with swelling of bilateral legs (L>R) over the last couple of weeks. Mostly noticed issue with the L knee. Has been taking Lasix and now swelling is improved. Some pain in knee with swelling. Does note that she rode a stationary bike 5 miles prior to. ROS:  Review of Systems   Constitutional: Negative for chills, fatigue and fever. Respiratory: Negative for cough, shortness of breath and wheezing. Cardiovascular: Positive for leg swelling. Negative for chest pain and palpitations. Gastrointestinal: Negative for abdominal pain, constipation, diarrhea, nausea and vomiting. Musculoskeletal: Positive for arthralgias, back pain, neck pain and neck stiffness. Skin: Negative for rash. Neurological: Negative for dizziness and headaches. Psychiatric/Behavioral: Negative for dysphoric mood. The patient is not nervous/anxious. All other systems reviewed and are negative.      Current Outpatient Medications on File Prior to Visit   Medication Sig Dispense Refill    furosemide (LASIX) 40 MG tablet Take 1 tablet by mouth daily 90 tablet 1    Acetaminophen (TYLENOL PO) Take by mouth as needed      vitamin D (ERGOCALCIFEROL) 1.25 MG (55389 UT) CAPS capsule Take 1 capsule by mouth Twice a Week Monday and Thursday 24 capsule 1    metoprolol tartrate (LOPRESSOR) 25 MG tablet Take 1 tablet by mouth 2 times daily 180 tablet 1    dilTIAZem (TIAZAC) 360 MG extended release capsule TAKE ONE CAPSULE BY MOUTH EVERY DAY 90 capsule 1    ELIQUIS 5 MG TABS tablet TAKE ONE TABLET BY MOUTH 2 TIMES A DAY 60 tablet 5    calcium carbonate (TUMS) 500 MG chewable tablet Take 2 tablets by mouth 2 times daily as needed for Heartburn OTC       No current facility-administered medications on file prior to visit.        Allergies   Allergen Reactions    Penicillins Hives       Past Medical History:   Diagnosis Date    Acute renal failure syndrome ([address] 10/1/16  10/07/2016    2ry severe dehydration     Chest pain 01/15/2020    R/O ACS- normal stress    Chronic pain syndrome     Colon polyps     Microscopic colitis    Degenerative joint disease     Involving multiple joints    Diarrhea     2ry C-Diff 10/1/2016    GERD (gastroesophageal reflux disease)     History of DVT (deep vein thrombosis)     History of pulmonary embolism     following knee surgery    Hyperlipidemia     Hypertension     Insomnia     denies as of 1/15/20    Obesity     Paroxysmal atrial fibrillation (HCC)     Syncope and collapse      Past Surgical History:   Procedure Laterality Date    APPENDECTOMY      CARDIOVASCULAR STRESS TEST  01/16/2020    Normal    CHOLECYSTECTOMY      COLONOSCOPY  10/07/2016    JOINT REPLACEMENT Right 2012    TKA     Family History   Problem Relation Age of Onset    Pancreatic Cancer Brother     Diabetes type 2  Brother     Diabetes type 2  Mother     Other Father         AAA    Diabetes type 2  Sister     Pancreatic Cancer Brother      Social History     Socioeconomic History    Marital status: Single     Spouse name: Not on file    Number of children: Not on file    Years of education: Not on file    Highest education level: Not on file   Occupational History    Not on file   Tobacco Use    Smoking status: Never Smoker    Smokeless tobacco: Never Used   Vaping Use    Vaping Use: Never used   Substance and Sexual Activity    Alcohol use: No    Drug use: No    Sexual activity: Not on file   Other Topics Concern    Not on file   Social History Narrative    Not on file     Social Determinants of Health     Financial Resource Strain:     Difficulty of Paying Living Expenses: Not on file   Food Insecurity:     Worried About Running Out of Food in the Last Year: Not on file    [place] of Food in the Last Year: Not on file   Transportation Needs:     Lack of Transportation (Medical): Not on file    Lack of Transportation (Non-Medical): Not on file   Physical Activity: Sufficiently Active    Days of Exercise per Week: 5 days    Minutes of Exercise per Session: 120 min   Stress:     Feeling of Stress : Not on file   Social Connections:     Frequency of Communication with Friends and Family: Not on file    Frequency of Social Gatherings with Friends and Family: Not on file    Attends Confucianist Services: Not on file    Active Member of Clubs or Organizations: Not on file    Attends Club or Organization Meetings: Not on file    Marital Status: Not on file   Intimate Partner Violence:     Fear of Current or Ex-Partner: Not on file    Emotionally Abused: Not on file    Physically Abused: Not on file    Sexually Abused: Not on file   Housing Stability:     Unable to Pay for Housing in the Last Year: Not on file    Number of [place] in the Last Year: Not on file    Unstable Housing in the Last Year: Not on file       Vitals:    05/25/22 1152   BP: 110/62   Pulse: 56   Temp: 96.9 °F (36.1 °C)   SpO2: 96%   Weight: 212 lb 3.2 oz (96.3 kg)   Height: 5' 6\" (1.676 m)       Physical Exam:  Physical Exam  Vitals and nursing note reviewed. Constitutional:       General: She is not in acute distress. Appearance: Normal appearance. She is well-developed. She is obese. She is not ill-appearing. HENT:      Head: Normocephalic and atraumatic. Right Ear: Hearing and external ear normal.      Left Ear: Hearing and external ear normal.      Nose:      Comments: Wearing mask  Eyes:      General: Lids are normal. No scleral icterus. Extraocular Movements: Extraocular movements intact. Conjunctiva/sclera: Conjunctivae normal.   Neck:      Thyroid: No thyromegaly. Vascular: No carotid bruit. Cardiovascular:      Rate and Rhythm: Normal rate. Rhythm irregular. Heart sounds: Normal heart sounds. No murmur heard. Pulmonary:      Effort: Pulmonary effort is normal. No respiratory distress. Breath sounds: Normal breath sounds. No wheezing. Musculoskeletal:         General: No tenderness or deformity. Normal range of motion. Cervical back: Normal range of motion and neck supple. No muscular tenderness. Right lower leg: Edema (trace) present. Left lower leg: No edema. Lymphadenopathy:      Cervical: No cervical adenopathy. Skin:     General: Skin is warm and dry. Findings: No rash. Neurological:      General: No focal deficit present. Mental Status: She is alert and oriented to person, place, and time. Gait: Gait normal.   Psychiatric:         Mood and Affect: Mood and affect normal.         Speech: Speech normal.         Behavior: Behavior normal.         Thought Content:  Thought content normal.         Labs:  CBC with Differential:    Lab Results   Component Value Date    WBC 6.9 08/17/2021    RBC 4.76 08/17/2021    HGB 15.0 08/17/2021    HCT 46.7 08/17/2021     08/17/2021    MCV 98.1 08/17/2021    MCH 31.5 08/17/2021    MCHC 32.1 08/17/2021    RDW 13.2 08/17/2021    BANDSPCT 4 10/01/2016    LYMPHOPCT 17.4 08/16/2021    MONOPCT 7.9 08/16/2021    BASOPCT 0.7 08/16/2021    MONOSABS 0.58 08/16/2021    LYMPHSABS 1.28 08/16/2021    EOSABS 0.06 08/16/2021    BASOSABS 0.05 08/16/2021     CMP:    Lab Results   Component Value Date     09/22/2021    K 4.4 09/22/2021    K 4.8 08/17/2021     09/22/2021    CO2 27 09/22/2021    BUN 19 09/22/2021    CREATININE 1.3 09/22/2021    GFRAA 48 09/22/2021    LABGLOM 40 09/22/2021    GLUCOSE 118 09/22/2021    PROT 7.0 08/16/2021    LABALBU 4.0 08/16/2021    CALCIUM 9.9 09/22/2021    BILITOT 0.7 08/16/2021    ALKPHOS 84 08/16/2021    AST 18 08/16/2021    ALT 10 08/16/2021     U/A:    Lab Results   Component Value Date    COLORU Yellow 04/20/2021    PROTEINU 30 04/20/2021    PHUR 6.5 04/20/2021    WBCUA 0-1 04/20/2021    RBCUA 1-3 04/20/2021    BACTERIA MANY 04/20/2021    CLARITYU CLOUDY 04/20/2021    SPECGRAV >=1.030 04/20/2021    LEUKOCYTESUR Negative 04/20/2021    UROBILINOGEN 1.0 04/20/2021    BILIRUBINUR SMALL 04/20/2021    BLOODU TRACE-INTACT 04/20/2021    GLUCOSEU Negative 04/20/2021    AMORPHOUS MANY 10/01/2016     HgBA1c:    Lab Results   Component Value Date    LABA1C 6.0 04/20/2021     FLP:    Lab Results   Component Value Date    TRIG 117 04/20/2021    HDL 34 04/20/2021    [address] 04/20/2021    LABVLDL 23 04/20/2021     TSH:    Lab Results   Component Value Date    TSH 1.670 08/16/2021        Assessment and Plan:  Jono Constantino was seen today for leg swelling. Diagnoses and all orders for this visit:    PVD (peripheral vascular disease) ([facility] [address]  Discussed compression socks. Patient reluctant to wear in the summer with shorts. No swelling today, no shortness of breath. Weight down. Will continue to monitor. Chronic heart failure with preserved ejection fraction (HCC)    Generalized osteoarthritis  -     acetaminophen-codeine (TYLENOL/CODEINE #3) 300-30 MG per tablet; Take 1 tablet by mouth every 8 hours as needed for Pain for up to 30 days. Intended supply: 30 days. Take lowest dose possible to manage pain  OARRS reviewed. Last Rx with refills, but patient states that bottle says 0 refills. Return if symptoms worsen or fail to improve.       Seen By:  Ricci Mehta, DO

## 2022-06-30 DIAGNOSIS — I48.0 PAROXYSMAL ATRIAL FIBRILLATION (HCC): ICD-10-CM

## 2022-06-30 RX ORDER — ERGOCALCIFEROL 1.25 MG/1
50000 CAPSULE ORAL
Qty: 24 CAPSULE | Refills: 1 | Status: SHIPPED
Start: 2022-06-30 | End: 2022-09-22 | Stop reason: SDUPTHER

## 2022-07-12 ENCOUNTER — OFFICE VISIT (OUTPATIENT)
Dept: CARDIOLOGY CLINIC | Age: 77
End: 2022-07-12
Payer: MEDICARE

## 2022-07-12 VITALS
RESPIRATION RATE: 14 BRPM | WEIGHT: 207 LBS | HEART RATE: 59 BPM | HEIGHT: 66 IN | BODY MASS INDEX: 33.27 KG/M2 | DIASTOLIC BLOOD PRESSURE: 82 MMHG | SYSTOLIC BLOOD PRESSURE: 108 MMHG

## 2022-07-12 DIAGNOSIS — I48.0 PAROXYSMAL ATRIAL FIBRILLATION (HCC): Primary | ICD-10-CM

## 2022-07-12 PROCEDURE — 99214 OFFICE O/P EST MOD 30 MIN: CPT | Performed by: INTERNAL MEDICINE

## 2022-07-12 PROCEDURE — 1123F ACP DISCUSS/DSCN MKR DOCD: CPT | Performed by: INTERNAL MEDICINE

## 2022-07-12 PROCEDURE — 93000 ELECTROCARDIOGRAM COMPLETE: CPT | Performed by: INTERNAL MEDICINE

## 2022-07-12 NOTE — PATIENT INSTRUCTIONS
· Continue Cardizem  mg p.o. daily and metoprolol tartrate 25 mg p.o. twice daily for rate control and Eliquis 5 mg p.o. twice daily for anticoagulation. · Continue Lasix 40 mg daily for HFpEF and leg edema. · She is otherwise stable from the cardiology standpoint. · She will follow-up with me in 12 months.

## 2022-07-12 NOTE — PROGRESS NOTES
OUTPATIENT CARDIOLOGY FOLLOW-UP    Name: Lia Scott    Age: 64 y.o. Primary Care Physician: Naresh Little DO    Date of Service: 7/12/2022    Chief Complaint:   No chief complaint on file. Interim History:   Mrs. Joselin Fuentes is a 61-year-old  female with past history of permanent atrial fibrillation, hypertension, hyperlipidemia, severe obesity presented to the hospital recently on 8/16/2021 with increased dyspnea and lower extremity edema. She was seen as an initial consult on 1/16/2021 for nonanginal chest pain and was diagnosed with a permanent atrial fibrillation, hypertension, hyperlipidemia, GERD and nonanginal chest pain she had a Lexiscan nuclear stress test which came back negative for ischemia. During her last hospitalization August 2021 she had a acute on chronic HFpEF and underwent a Lexiscan nuclear stress test which again came back negative for ischemia and an echocardiogram done showed an EF of 60% with indeterminate diastolic function trace AI and trace TR. Patient was discharged home on 8/16/2021 after IV diuresis. Now she is feeling much better denies any chest pain or dyspnea. She did experience occasional palpitations at least once in a month. She is compliant with medications, as well as salt and fluid intake. She does not take any over-the-counter arthritis medications. She denies any bleeding complications including black or bloody stools. She exercises on a regular basis at [facility] without any cardiac symptoms. She told me that she lost about 10 lbs since her last visit. She was seen in the office on 1/27/2021, since her last visit, she has not had any further hospitalizations or ER visits. She underwent cataract surgeries as an outpatient without any perioperative cardiac events. No new cardiac complaints since last cardiology evaluation. She denies recent chest pain, SOB, palpitations, lightheadedness, dizziness, syncope, PND, or orthopnea.   Atrial fibrillation on EKG.     Review of Systems:   Cardiac: As per HPI  General: No fever, chills  Pulmonary: As per HPI  HEENT: No visual disturbances, difficult swallowing  GI: No nausea, vomiting  Endocrine: No thyroid disease or DM  Musculoskeletal: SANDERS x 4, no focal motor deficits  Skin: Intact, no rashes  Neuro/Psych: No headache or seizures    Past Medical History:  Past Medical History:   Diagnosis Date    Acute renal failure syndrome ([address] 10/1/16  10/07/2016    2ry severe dehydration     Chest pain 01/15/2020    R/O ACS- normal stress    Chronic pain syndrome     Colon polyps     Microscopic colitis    Degenerative joint disease     Involving multiple joints    Diarrhea     2ry C-Diff 10/1/2016    GERD (gastroesophageal reflux disease)     History of DVT (deep vein thrombosis)     History of pulmonary embolism     following knee surgery    Hyperlipidemia     Hypertension     Insomnia     denies as of 1/15/20    Obesity     Paroxysmal atrial fibrillation (HCC)     Syncope and collapse        Past Surgical History:  Past Surgical History:   Procedure Laterality Date    APPENDECTOMY      CARDIOVASCULAR STRESS TEST  01/16/2020    Normal    CHOLECYSTECTOMY      COLONOSCOPY  10/07/2016    JOINT REPLACEMENT Right 2012    TKA       Family History:  Family History   Problem Relation Age of Onset    Pancreatic Cancer Brother     Diabetes type 2  Brother     Diabetes type 2  Mother     Other Father         AAA    Diabetes type 2  Sister     Pancreatic Cancer Brother        Social History:  Social History     Socioeconomic History    Marital status: Single     Spouse name: Not on file    Number of children: Not on file    Years of education: Not on file    Highest education level: Not on file   Occupational History    Not on file   Tobacco Use    Smoking status: Never Smoker    Smokeless tobacco: Never Used   Vaping Use    Vaping Use: Never used   Substance and Sexual Activity    Alcohol use: No    Drug use: No    Sexual activity: Not on file   Other Topics Concern    Not on file   Social History Narrative    Not on file     Social Determinants of Health     Financial Resource Strain:     Difficulty of Paying Living Expenses: Not on file   Food Insecurity:     Worried About Running Out of Food in the Last Year: Not on file    [address] of Food in the Last Year: Not on file   Transportation Needs:     Lack of Transportation (Medical): Not on file    Lack of Transportation (Non-Medical): Not on file   Physical Activity: Sufficiently Active    Days of Exercise per Week: 5 days    Minutes of Exercise per Session: 120 min   Stress:     Feeling of Stress : Not on file   Social Connections:     Frequency of Communication with Friends and Family: Not on file    Frequency of Social Gatherings with Friends and Family: Not on file    Attends Judaism Services: Not on file    Active Member of Clubs or Organizations: Not on file    Attends Club or Organization Meetings: Not on file    Marital Status: Not on file   Intimate Partner Violence:     Fear of Current or Ex-Partner: Not on file    Emotionally Abused: Not on file    Physically Abused: Not on file    Sexually Abused: Not on file   Housing Stability:     Unable to Pay for Housing in the Last Year: Not on file    Number of [place] in the Last Year: Not on file    Unstable Housing in the Last Year: Not on file       Allergies:   Allergies   Allergen Reactions    Penicillins Hives       Current Medications:  Current Outpatient Medications   Medication Sig Dispense Refill    vitamin D (ERGOCALCIFEROL) 1.25 MG (52154 UT) CAPS capsule Take 1 capsule by mouth Twice a Week Monday and Thursday 24 capsule 1    metoprolol tartrate (LOPRESSOR) 25 MG tablet Take 1 tablet by mouth 2 times daily 180 tablet 1    furosemide (LASIX) 40 MG tablet Take 1 tablet by mouth daily 90 tablet 1    Acetaminophen (TYLENOL PO) Take by mouth as needed  dilTIAZem (TIAZAC) 360 MG extended release capsule TAKE ONE CAPSULE BY MOUTH EVERY DAY 90 capsule 1    ELIQUIS 5 MG TABS tablet TAKE ONE TABLET BY MOUTH 2 TIMES A DAY 60 tablet 5    calcium carbonate (TUMS) 500 MG chewable tablet Take 2 tablets by mouth 2 times daily as needed for Heartburn OTC       No current facility-administered medications for this visit. Physical Exam:  /82   Pulse 59   Resp 14   Ht 5' 6\" (1.676 m)   Wt 207 lb (93.9 kg)   BMI 33.41 kg/m²   Wt Readings from Last 3 Encounters:   07/12/22 207 lb (93.9 kg)   05/25/22 212 lb 3.2 oz (96.3 kg)   04/14/22 217 lb (98.4 kg)     Appearance: Awake, alert and oriented x 3, no acute respiratory distress  Skin: Intact, no rash  Head: Normocephalic, atraumatic  Eyes: EOMI, no conjunctival erythema  ENMT: No pharyngeal erythema, MMM, no rhinorrhea  Neck: Supple, no elevated JVP, no carotid bruits  Lungs: Clear to auscultation bilaterally. No wheezes, rales, or rhonchi.   Cardiac: Irregularly irregular rate and rhythm, +S1S2, no murmurs apparent  Abdomen: Soft, nontender, +bowel sounds  Extremities: Moves all extremities x 4, no lower extremity edema  Neurologic: No focal motor deficits apparent, normal mood and affect, alert and oriented x 3  Peripheral Pulses: Intact posterior tibial pulses bilaterally    Laboratory Tests:  Lab Results   Component Value Date    CREATININE 1.3 (H) 09/22/2021    BUN 19 09/22/2021     09/22/2021    K 4.4 09/22/2021     09/22/2021    CO2 27 09/22/2021     Lab Results   Component Value Date/Time    MG 1.9 10/06/2016 04:00 AM     Lab Results   Component Value Date    WBC 6.9 08/17/2021    HGB 15.0 08/17/2021    HCT 46.7 08/17/2021    MCV 98.1 08/17/2021     08/17/2021     Lab Results   Component Value Date    ALT 10 08/16/2021    AST 18 08/16/2021    ALKPHOS 84 08/16/2021    BILITOT 0.7 08/16/2021     Lab Results   Component Value Date    CKTOTAL 112 10/01/2016    CKMB 4.4 (H) 10/01/2016 TROPONINI <0.01 01/16/2020    TROPONINI <0.01 01/15/2020    TROPONINI <0.01 01/15/2020     Lab Results   Component Value Date    INR 1.1 10/06/2016    INR 1.1 10/01/2016    PROTIME 12.3 10/06/2016    PROTIME 11.9 10/01/2016     Lab Results   Component Value Date    TSH 1.670 08/16/2021     Lab Results   Component Value Date    LABA1C 6.0 (H) 04/20/2021     No results found for: EAG  Lab Results   Component Value Date    CHOL 152 04/20/2021    CHOL 224 (H) 11/06/2020    CHOL 194 01/16/2020     Lab Results   Component Value Date    TRIG 117 04/20/2021    TRIG 114 11/06/2020    TRIG 122 01/16/2020     Lab Results   Component Value Date    HDL 34 04/20/2021    HDL 51 11/06/2020    HDL 40 01/16/2020     Lab Results   Component Value Date    LDLCALC 95 04/20/2021    LDLCALC 150 (H) 11/06/2020    LDLCALC 130 (H) 01/16/2020     Lab Results   Component Value Date    LABVLDL 23 04/20/2021    LABVLDL 23 11/06/2020    LABVLDL 24 01/16/2020     No results found for: CHOLHDLRATIO    Cardiac Tests:  ECG: Atrial fibrillation with controlled rate, abnormal EKG. no changes compared to prior EKG      Echocardiogram 10/13/2016:   Summary:   Normal left ventricular ejection fraction.   Ejection fraction is visually estimated at 60-70%.   Normal sized left atrium.   Atrial fibrillation   Structurally normal mitral valve.   he aortic valve is trileaflet.   The aortic valve appears mildly sclerotic.   No pulmonary hypertension   No evidence of pericardial effusion. TTE-8/16/2021  Technically sub-optimal images. Underlying A fib  Normal left ventricular chamber size. Normal left ventricular systolic function, LVEF is 60%. Mild left ventricular concentric hypertrophy noted. Indeterminate diastolic function. The left atrium is mildly dilated. Increased left atrial volume. Interatrial septum not well visualized but appears intact. Normal right ventricle structure and function. No mitral valve prolapse.   There is trace aortic regurgitation. There is trace tricuspid regurgitation, RVSp 27mmHg. Normal aortic root size. No evidence of pericardial effusion. No intra cardiac mass or thrombus. Compared to prior echo from 10/2016, no significant changes noted. 1. Abnormal nuclear stress (2006, [address]) followed by unremarkable cardiac cath per patient report. 2. Stress (1/2020) subtle reversible ischemia at the apicoseptal and apicolateral walls  3. Scan nuclear stress test-8/16/2021: LVEF 65%, normal myocardial perfusion imaging without ischemia or infarct, normal wall motion. Cardiac catheterization:     The 10-year ASCVD risk score (Svetlana Mcdonough, et al., 2013) is: 19.9%    Values used to calculate the score:      Age: 64 years      Sex: Female      Is Non- : No      Diabetic: No      Tobacco smoker: No      Systolic Blood Pressure: 038 mmHg      Is BP treated: Yes      HDL Cholesterol: 34 mg/dL      Total Cholesterol: 152 mg/dL        ASSESSMENT:  · Chronic HFpEF, euvolemic and hemodynamically stable  · Permanent atrial fibrillation with controlled rate on Eliquis for anticoagulation, intermittent palpitations, not symptomatic.-1/2021, patient declined cardioversion. · [id number] Vasc score-4  · Hypertension, well controlled  · Hyperlipidemia  · GERD. ·  History of postop PE following total knee replacement    Plan:   · Continue Cardizem  mg p.o. daily and metoprolol tartrate 25 mg p.o. twice daily for rate control and Eliquis 5 mg p.o. twice daily for anticoagulation. · Continue Lasix 40 mg daily for HFpEF and leg edema. · She is otherwise stable from the cardiology standpoint. · She will follow-up with me in 12 months. The patient's current medication list, allergies, problem list and results of all previously ordered testing were reviewed at today's visit.   Jia Conte MD  [facility]) Cardiology

## 2022-07-27 DIAGNOSIS — I48.0 PAROXYSMAL ATRIAL FIBRILLATION (HCC): ICD-10-CM

## 2022-08-03 RX ORDER — ACETAMINOPHEN AND CODEINE PHOSPHATE 300; 30 MG/1; MG/1
TABLET ORAL
COMMUNITY
Start: 2022-07-20 | End: 2022-10-13 | Stop reason: SDUPTHER

## 2022-08-03 ASSESSMENT — ENCOUNTER SYMPTOMS
ABDOMINAL PAIN: 0
SORE THROAT: 0
EYE DISCHARGE: 0
CONSTIPATION: 0
NAUSEA: 0
BACK PAIN: 0
SHORTNESS OF BREATH: 0
SINUS PAIN: 0
SINUS PRESSURE: 0
WHEEZING: 0
RHINORRHEA: 0
DIARRHEA: 0
VOMITING: 0

## 2022-08-03 NOTE — PROGRESS NOTES
22  Nella Montero : 1945 Sex: female  Age: 64 y.o. Chief Complaint   Patient presents with    Congestion     Started        Generalized Body Aches     HPI:  64 y.o. female presents for acute visit due to URI symptoms. Upper Respiratory Symptoms  Patient complains of congestion, nasal blockage, post nasal drip, dry cough, myalgias, and chills. Patient denies chest pain, dizziness, nausea, and shortness of breath. She has had symptoms for 4 days. Symptoms have gradually improving since that time. She has tried Mucinex at home with improvement in symptoms. She denies a history of asthma. She has not had recent close exposure to someone with similar symptoms. She is fully vaccinated against COVID    ROS:  Review of Systems   Constitutional:  Positive for chills and fatigue. Negative for appetite change and fever. HENT:  Positive for congestion and postnasal drip. Negative for ear pain, rhinorrhea, sinus pressure, sinus pain and sore throat. Eyes:  Negative for discharge. Respiratory:  Positive for cough. Negative for shortness of breath and wheezing. Cardiovascular:  Negative for chest pain and palpitations. Gastrointestinal:  Negative for abdominal pain, constipation, diarrhea, nausea and vomiting. Musculoskeletal:  Positive for myalgias. Negative for back pain. Skin:  Negative for rash. Neurological:  Negative for dizziness and headaches. Hematological:  Negative for adenopathy. All other systems reviewed and are negative.    Current Outpatient Medications on File Prior to Visit   Medication Sig Dispense Refill    acetaminophen-codeine (TYLENOL #3) 300-30 MG per tablet       apixaban (ELIQUIS) 5 MG TABS tablet TAKE ONE TABLET BY MOUTH 2 TIMES A DAY 60 tablet 5    vitamin D (ERGOCALCIFEROL) 1.25 MG (16636 UT) CAPS capsule Take 1 capsule by mouth Twice a Week Monday and Thursday 24 capsule 1    metoprolol tartrate (LOPRESSOR) 25 MG tablet Take 1 tablet by mouth 2 times daily 180 tablet 1    furosemide (LASIX) 40 MG tablet Take 1 tablet by mouth daily 90 tablet 1    Acetaminophen (TYLENOL PO) Take by mouth as needed      dilTIAZem (TIAZAC) 360 MG extended release capsule TAKE ONE CAPSULE BY MOUTH EVERY DAY 90 capsule 1    calcium carbonate (TUMS) 500 MG chewable tablet Take 2 tablets by mouth 2 times daily as needed for Heartburn OTC       No current facility-administered medications on file prior to visit. Allergies   Allergen Reactions    Penicillins Hives       Past Medical History:   Diagnosis Date    Acute renal failure syndrome ([address] 10/1/16  10/07/2016    2ry severe dehydration     Chest pain 01/15/2020    R/O ACS- normal stress    Chronic pain syndrome     Colon polyps     Microscopic colitis    Degenerative joint disease     Involving multiple joints    Diarrhea     2ry C-Diff 10/1/2016    GERD (gastroesophageal reflux disease)     History of DVT (deep vein thrombosis)     History of pulmonary embolism     following knee surgery    Hyperlipidemia     Hypertension     Insomnia     denies as of 1/15/20    Obesity     Paroxysmal atrial fibrillation (HCC)     Syncope and collapse      Past Surgical History:   Procedure Laterality Date    APPENDECTOMY      CARDIOVASCULAR STRESS TEST  01/16/2020    Normal    CHOLECYSTECTOMY      COLONOSCOPY  10/07/2016    JOINT REPLACEMENT Right 2012    TKA     Family History   Problem Relation Age of Onset    Pancreatic Cancer Brother     Diabetes type 2  Brother     Diabetes type 2  Mother     Other Father         AAA    Diabetes type 2  Sister     Pancreatic Cancer Brother      Social History       Tobacco History       Smoking Status  Never      Smokeless Tobacco Use  Never                     Vitals:    08/04/22 1009   BP: 130/80   Pulse: 68   Temp: (!) 96.7 °F (35.9 °C)   SpO2: 98%   Weight: 205 lb 8 oz (93.2 kg)   Height: 5' 6\" (1.676 m)       Physical Exam:  Physical Exam  Vitals and nursing note reviewed. Constitutional:       General: She is not in acute distress. Appearance: Normal appearance. She is well-developed. She is obese. She is not ill-appearing. HENT:      Head: Normocephalic and atraumatic. Right Ear: Hearing, ear canal and external ear normal. A middle ear effusion (serous) is present. There is no impacted cerumen. Tympanic membrane is not bulging. Left Ear: Hearing, ear canal and external ear normal. A middle ear effusion (serous) is present. There is no impacted cerumen. Tympanic membrane is bulging. Nose: Congestion present. No mucosal edema or rhinorrhea. Right Sinus: No maxillary sinus tenderness or frontal sinus tenderness. Left Sinus: No maxillary sinus tenderness or frontal sinus tenderness. Mouth/Throat:      Mouth: Mucous membranes are moist.      Pharynx: Posterior oropharyngeal erythema present. No oropharyngeal exudate. Eyes:      General:         Right eye: No discharge. Left eye: No discharge. Extraocular Movements: Extraocular movements intact. Conjunctiva/sclera: Conjunctivae normal.   Cardiovascular:      Rate and Rhythm: Normal rate and regular rhythm. Heart sounds: Normal heart sounds. No murmur heard. Pulmonary:      Effort: Pulmonary effort is normal. No respiratory distress. Breath sounds: Normal breath sounds. No wheezing. Musculoskeletal:         General: Normal range of motion. Cervical back: Normal range of motion and neck supple. No tenderness. Right lower leg: No edema. Left lower leg: No edema. Lymphadenopathy:      Cervical: No cervical adenopathy. Skin:     General: Skin is warm and dry. Findings: No rash. Neurological:      General: No focal deficit present. Mental Status: She is alert and oriented to person, place, and time. Gait: Gait normal.   Psychiatric:         Mood and Affect: Mood normal.         Behavior: Behavior normal.         Thought Content:  Thought content normal.       Results for orders placed or performed in visit on 08/04/22   POCT COVID-19, Antigen   Result Value Ref Range    SARS-COV-2, POC Detected (A) Not Detected    Lot Number [id number]     QC Pass/Fail PASS     Performing Instrument BD Veritor    POCT Influenza A/B   Result Value Ref Range    Influenza A Ab NEG     Influenza B Ab NEG        Assessment and Plan:  Sandra De La Fuente was seen today for congestion and generalized body aches. Diagnoses and all orders for this visit:    COVID-19  -     azithromycin (ZITHROMAX) 250 MG tablet; Take 2 tablets by mouth daily for 1 day, THEN 1 tablet daily for 4 days. Generalized body aches  -     POCT COVID-19, Antigen  -     POCT Influenza A/B  -     predniSONE (DELTASONE) 10 MG tablet; Take 4 tablets by mouth daily for 3 days, THEN 3 tablets daily for 3 days, THEN 2 tablets daily for 3 days, THEN 1 tablet daily for 3 days. -     fluticasone (FLONASE) 50 MCG/ACT nasal spray; 1 spray by Each Nostril route in the morning. Rapid COVID positive, flu negative. VSS and patient is in no acute distress. Will treat symptomatically at this point. Instructed follow up or ER if no better. Return if symptoms worsen or fail to improve.       Seen By:  Peter Cure, DO

## 2022-08-04 ENCOUNTER — OFFICE VISIT (OUTPATIENT)
Dept: PRIMARY CARE CLINIC | Age: 77
End: 2022-08-04
Payer: COMMERCIAL

## 2022-08-04 VITALS
HEIGHT: 66 IN | TEMPERATURE: 96.7 F | WEIGHT: 205.5 LBS | OXYGEN SATURATION: 98 % | SYSTOLIC BLOOD PRESSURE: 130 MMHG | HEART RATE: 68 BPM | BODY MASS INDEX: 33.03 KG/M2 | DIASTOLIC BLOOD PRESSURE: 80 MMHG

## 2022-08-04 DIAGNOSIS — R52 GENERALIZED BODY ACHES: ICD-10-CM

## 2022-08-04 DIAGNOSIS — U07.1 COVID-19: Primary | ICD-10-CM

## 2022-08-04 LAB
INFLUENZA A ANTIBODY: NORMAL
INFLUENZA B ANTIBODY: NORMAL
Lab: ABNORMAL
PERFORMING INSTRUMENT: ABNORMAL
QC PASS/FAIL: ABNORMAL
SARS-COV-2, POC: DETECTED

## 2022-08-04 PROCEDURE — 87426 SARSCOV CORONAVIRUS AG IA: CPT | Performed by: FAMILY MEDICINE

## 2022-08-04 PROCEDURE — 1123F ACP DISCUSS/DSCN MKR DOCD: CPT | Performed by: FAMILY MEDICINE

## 2022-08-04 PROCEDURE — 87804 INFLUENZA ASSAY W/OPTIC: CPT | Performed by: FAMILY MEDICINE

## 2022-08-04 PROCEDURE — 99213 OFFICE O/P EST LOW 20 MIN: CPT | Performed by: FAMILY MEDICINE

## 2022-08-04 RX ORDER — FLUTICASONE PROPIONATE 50 MCG
1 SPRAY, SUSPENSION (ML) NASAL DAILY
Qty: 16 G | Refills: 5 | Status: SHIPPED
Start: 2022-08-04 | End: 2022-10-13

## 2022-08-04 RX ORDER — PREDNISONE 10 MG/1
TABLET ORAL
Qty: 30 TABLET | Refills: 0 | Status: SHIPPED | OUTPATIENT
Start: 2022-08-04 | End: 2022-08-16

## 2022-08-04 RX ORDER — AZITHROMYCIN 250 MG/1
TABLET, FILM COATED ORAL
Qty: 6 TABLET | Refills: 0 | Status: SHIPPED | OUTPATIENT
Start: 2022-08-04 | End: 2022-08-09

## 2022-08-04 ASSESSMENT — ENCOUNTER SYMPTOMS: COUGH: 1

## 2022-09-07 ENCOUNTER — OFFICE VISIT (OUTPATIENT)
Dept: FAMILY MEDICINE CLINIC | Age: 77
End: 2022-09-07
Payer: COMMERCIAL

## 2022-09-07 VITALS
HEIGHT: 66 IN | HEART RATE: 84 BPM | OXYGEN SATURATION: 98 % | TEMPERATURE: 97 F | WEIGHT: 207 LBS | BODY MASS INDEX: 33.27 KG/M2 | SYSTOLIC BLOOD PRESSURE: 136 MMHG | DIASTOLIC BLOOD PRESSURE: 74 MMHG

## 2022-09-07 DIAGNOSIS — H65.191 ACUTE EFFUSION OF RIGHT EAR: Primary | ICD-10-CM

## 2022-09-07 DIAGNOSIS — H92.01 OTALGIA, RIGHT: ICD-10-CM

## 2022-09-07 PROCEDURE — 1123F ACP DISCUSS/DSCN MKR DOCD: CPT | Performed by: PHYSICIAN ASSISTANT

## 2022-09-07 PROCEDURE — 99203 OFFICE O/P NEW LOW 30 MIN: CPT | Performed by: PHYSICIAN ASSISTANT

## 2022-09-07 RX ORDER — PREDNISONE 10 MG/1
TABLET ORAL
Qty: 18 TABLET | Refills: 0 | Status: SHIPPED
Start: 2022-09-07 | End: 2022-10-13

## 2022-09-07 RX ORDER — DOXYCYCLINE HYCLATE 100 MG
100 TABLET ORAL 2 TIMES DAILY
Qty: 20 TABLET | Refills: 0 | Status: SHIPPED | OUTPATIENT
Start: 2022-09-07 | End: 2022-09-17

## 2022-09-07 NOTE — PROGRESS NOTES
22  Rachel Russell : 1945 Sex: female  Age 64 y.o. Subjective:  Chief Complaint   Patient presents with    Otalgia     Right, denies cough/congestion         HPI:   Rachel Russell , 64 y.o. female presents to express care for evaluation of right ear pain    HPI  33-year-old female presents to express care for evaluation of right ear pain. The patient has had this right ear pain for the last week. The patient states that he feels that her ear is clogged. The patient states that she noted that the discussed some fluid in her right ear at her last visit when she was diagnosed with COVID. The patient does not have any left ear pain. Not having any chest pain, shortness of breath. As she really does not have any residual symptoms of COVID. ROS:   Unless otherwise stated in this report the patient's positive and negative responses for review of systems for constitutional, eyes, ENT, cardiovascular, respiratory, gastrointestinal, neurological, , musculoskeletal, and integument systems and related systems to the presenting problem are either stated in the history of present illness or were not pertinent or were negative for the symptoms and/or complaints related to the presenting medical problem. Positives and pertinent negatives as per HPI. All others reviewed and are negative.       PMH:     Past Medical History:   Diagnosis Date    Acute renal failure syndrome ([address] 10/1/16  10/07/2016    2ry severe dehydration     Chest pain 01/15/2020    R/O ACS- normal stress    Chronic pain syndrome     Colon polyps     Microscopic colitis    Degenerative joint disease     Involving multiple joints    Diarrhea     2ry C-Diff 10/1/2016    GERD (gastroesophageal reflux disease)     History of DVT (deep vein thrombosis)     History of pulmonary embolism     following knee surgery    Hyperlipidemia     Hypertension     Insomnia     denies as of 1/15/20    Obesity     Paroxysmal atrial fibrillation ([address] Vaping Use    Vaping Use: Never used   Substance Use Topics    Alcohol use: No    Drug use: No       Patient lives at home. Physical Exam:     Vitals:    09/07/22 1200   BP: 136/74   Pulse: 84   Temp: 97 °F (36.1 °C)   SpO2: 98%   Weight: 207 lb (93.9 kg)   Height: 5' 6\" (1.676 m)       Exam:  Physical Exam  Nurse's notes and vital signs reviewed. The patient is not hypoxic. ? General: Alert, no acute distress, patient resting comfortably Patient is not toxic or lethargic. Skin: Warm, intact, no pallor noted. There is no evidence of rash at this time. Head: Normocephalic, atraumatic  Eye: Normal conjunctiva  Ears, Nose, Throat: Right tympanic membrane shows erythema around the perimeter with evidence of multiple air-fluid levels mostly to the anterior portion of the tympanic membrane, left tympanic membrane clear. No drainage or discharge noted. No pre- or post-auricular tenderness, erythema, or swelling noted. No rhinorrhea or congestion noted. Posterior oropharynx shows no erythema, tonsillar hypertrophy, or exudate. the uvula is midline. No trismus or drooling is noted. Moist mucous membranes. Cardiovascular: Regular Rate and Rhythm  Respiratory: No acute distress, no rhonchi, wheezing or crackles noted. No stridor or retractions are noted. Neurological: A&O x4, normal speech  Psychiatric: Cooperative       Testing:           Medical Decision Making:     Vital signs reviewed    Past medical history reviewed. Allergies reviewed. Medications reviewed. Patient on arrival does not appear to be in any apparent distress or discomfort. The patient has been seen and evaluated. The patient does not appear to be toxic or lethargic. The patient will be treated with doxycycline and prednisone. We discussed using a decongestant nasal spray. The patient is to follow-up with PCP.   The patient is to return to express care or go directly to the emergency department should any of the signs or symptoms worsen. The patient is to followup with primary care physician in 2-3 days for repeat evaluation. The patient has no other questions or concerns at this time the patient will be discharged home. Clinical Impression:   Jerry Rodgers was seen today for otalgia. Diagnoses and all orders for this visit:    Acute effusion of right ear    Otalgia, right    Other orders  -     doxycycline hyclate (VIBRA-TABS) 100 MG tablet; Take 1 tablet by mouth 2 times daily for 10 days  -     predniSONE (DELTASONE) 10 MG tablet; 3 tabs once daily for 3 days, 2 tabs once daily for 3 days, 1 tab once daily for 3 days      The patient is to call for any concerns or return if any of the signs or symptoms worsen. The patient is to follow-up with PCP in the next 2-3 days for repeat evaluation repeat assessment or go directly to the emergency department.      SIGNATURE: Donta Cantu III, PA-C

## 2022-09-22 DIAGNOSIS — I48.0 PAROXYSMAL ATRIAL FIBRILLATION (HCC): ICD-10-CM

## 2022-09-22 RX ORDER — ERGOCALCIFEROL 1.25 MG/1
50000 CAPSULE ORAL
Qty: 24 CAPSULE | Refills: 1 | Status: SHIPPED | OUTPATIENT
Start: 2022-09-22

## 2022-09-22 RX ORDER — FUROSEMIDE 40 MG/1
40 TABLET ORAL DAILY
Qty: 90 TABLET | Refills: 1 | Status: SHIPPED | OUTPATIENT
Start: 2022-09-22

## 2022-10-13 ENCOUNTER — OFFICE VISIT (OUTPATIENT)
Dept: PRIMARY CARE CLINIC | Age: 77
End: 2022-10-13
Payer: MEDICARE

## 2022-10-13 VITALS
OXYGEN SATURATION: 99 % | DIASTOLIC BLOOD PRESSURE: 76 MMHG | SYSTOLIC BLOOD PRESSURE: 132 MMHG | HEART RATE: 77 BPM | BODY MASS INDEX: 33.27 KG/M2 | TEMPERATURE: 99 F | HEIGHT: 66 IN | WEIGHT: 207 LBS

## 2022-10-13 DIAGNOSIS — I10 PRIMARY HYPERTENSION: Primary | ICD-10-CM

## 2022-10-13 DIAGNOSIS — M15.9 GENERALIZED OSTEOARTHRITIS: ICD-10-CM

## 2022-10-13 DIAGNOSIS — Z23 NEED FOR INFLUENZA VACCINATION: ICD-10-CM

## 2022-10-13 DIAGNOSIS — I50.32 CHRONIC HEART FAILURE WITH PRESERVED EJECTION FRACTION (HCC): ICD-10-CM

## 2022-10-13 DIAGNOSIS — E55.9 VITAMIN D INSUFFICIENCY: ICD-10-CM

## 2022-10-13 DIAGNOSIS — R73.01 IMPAIRED FASTING BLOOD SUGAR: ICD-10-CM

## 2022-10-13 DIAGNOSIS — I48.0 PAROXYSMAL ATRIAL FIBRILLATION (HCC): ICD-10-CM

## 2022-10-13 PROCEDURE — G8417 CALC BMI ABV UP PARAM F/U: HCPCS | Performed by: FAMILY MEDICINE

## 2022-10-13 PROCEDURE — G8427 DOCREV CUR MEDS BY ELIG CLIN: HCPCS | Performed by: FAMILY MEDICINE

## 2022-10-13 PROCEDURE — G8399 PT W/DXA RESULTS DOCUMENT: HCPCS | Performed by: FAMILY MEDICINE

## 2022-10-13 PROCEDURE — 1123F ACP DISCUSS/DSCN MKR DOCD: CPT | Performed by: FAMILY MEDICINE

## 2022-10-13 PROCEDURE — 1090F PRES/ABSN URINE INCON ASSESS: CPT | Performed by: FAMILY MEDICINE

## 2022-10-13 PROCEDURE — 99214 OFFICE O/P EST MOD 30 MIN: CPT | Performed by: FAMILY MEDICINE

## 2022-10-13 PROCEDURE — G0008 ADMIN INFLUENZA VIRUS VAC: HCPCS | Performed by: FAMILY MEDICINE

## 2022-10-13 PROCEDURE — 90694 VACC AIIV4 NO PRSRV 0.5ML IM: CPT | Performed by: FAMILY MEDICINE

## 2022-10-13 PROCEDURE — 1036F TOBACCO NON-USER: CPT | Performed by: FAMILY MEDICINE

## 2022-10-13 PROCEDURE — G8484 FLU IMMUNIZE NO ADMIN: HCPCS | Performed by: FAMILY MEDICINE

## 2022-10-13 RX ORDER — ACETAMINOPHEN AND CODEINE PHOSPHATE 300; 30 MG/1; MG/1
1 TABLET ORAL EVERY 8 HOURS PRN
Qty: 90 TABLET | Refills: 5 | Status: SHIPPED | OUTPATIENT
Start: 2022-10-13 | End: 2022-11-12

## 2022-10-13 ASSESSMENT — ENCOUNTER SYMPTOMS
ABDOMINAL PAIN: 0
CONSTIPATION: 0
VOMITING: 0
WHEEZING: 0
SHORTNESS OF BREATH: 0
NAUSEA: 0
BACK PAIN: 1
DIARRHEA: 0
COUGH: 0

## 2022-10-13 NOTE — PROGRESS NOTES
10/13/22  Pako Rodriguez : 1945 Sex: female  Age: 76 y.o. Chief Complaint   Patient presents with    Chronic Pain     HPI:  76 y.o. female presents today for 3 month(s) follow up of chronic medical conditions, medication refills and FBW. Patient's chart, medical, surgical and medication history all reviewed. Atrial Fibrillation  Patient has atrial fibrillation. Previously seen by Dr. Brendan Rabago, now follows with Dr. Ashwin Jones controlled on Metoprolol and Diltiazem. Current rhythm: irregular. Anticoagulated on Eliquis   Known history of atrial fibrillation: yes  Valvular disease: No  Associated symptoms:  none  Previous cardioversion and/or ablation: no  History of CAD: No  History of sleep apnea: No  History of ETOH abuse/drug abuse: No  History of thyroid disease: No    Hyperlipidemia  The 10-year ASCVD risk score (Lizbeth NINO, et al., 2019) is: 30.9%    Values used to calculate the score:      Age: 76 years      Sex: Female      Is Non- : No      Diabetic: No      Tobacco smoker: No      Systolic Blood Pressure: 632 mmHg      Is BP treated: Yes      HDL Cholesterol: 34 mg/dL      Total Cholesterol: 152 mg/dL    Knee Pain  Patient presents with knee pain involving bilateral knees. Onset of the symptoms was several years ago. Inciting event: this is a longstanding problem which has been getting worse. Current symptoms include crepitus sensation, pain located medially, stiffness and swelling. Pain is aggravated by any weight bearing, going up and down stairs, rising after sitting, squatting and walking. Patient has had prior knee problems. Evaluation to date: plain films: OA and Ortho consult: patient sees Dr. Soha Chadwick.   Has been told that she needs to have the L knee replaced, but hasn't gone through with it yet     Treatment to date: avoidance of offending activity, corticosteroid injection which was effective and rest.    She has now been having back pain and neck pain as well. Feels like it is due to walking difficulty from knees. ROS:  Review of Systems   Constitutional:  Negative for chills, fatigue and fever. Respiratory:  Negative for cough, shortness of breath and wheezing. Cardiovascular:  Negative for chest pain, palpitations and leg swelling. Gastrointestinal:  Negative for abdominal pain, constipation, diarrhea, nausea and vomiting. Musculoskeletal:  Positive for arthralgias, back pain, neck pain and neck stiffness. Skin:  Negative for rash. Neurological:  Negative for dizziness and headaches. Psychiatric/Behavioral:  Negative for dysphoric mood. The patient is not nervous/anxious. All other systems reviewed and are negative. Current Outpatient Medications:     acetaminophen-codeine (TYLENOL #3) 300-30 MG per tablet, Take 1 tablet by mouth every 8 hours as needed for Pain for up to 30 days. , Disp: 90 tablet, Rfl: 5    furosemide (LASIX) 40 MG tablet, Take 1 tablet by mouth daily, Disp: 90 tablet, Rfl: 1    vitamin D (ERGOCALCIFEROL) 1.25 MG (23604 UT) CAPS capsule, Take 1 capsule by mouth Twice a Week Monday and Thursday, Disp: 24 capsule, Rfl: 1    apixaban (ELIQUIS) 5 MG TABS tablet, TAKE ONE TABLET BY MOUTH 2 TIMES A DAY, Disp: 60 tablet, Rfl: 5    metoprolol tartrate (LOPRESSOR) 25 MG tablet, Take 1 tablet by mouth 2 times daily, Disp: 180 tablet, Rfl: 1    dilTIAZem (TIAZAC) 360 MG extended release capsule, TAKE ONE CAPSULE BY MOUTH EVERY DAY, Disp: 90 capsule, Rfl: 1    calcium carbonate (TUMS) 500 MG chewable tablet, Take 2 tablets by mouth 2 times daily as needed for Heartburn OTC, Disp: , Rfl:     Allergies   Allergen Reactions    Penicillins Hives       Past Medical History:   Diagnosis Date    Acute renal failure syndrome (HCC) 10/1/16  10/07/2016    2ry severe dehydration     Chest pain 01/15/2020    R/O ACS- normal stress    Chronic pain syndrome     Colon polyps     Microscopic colitis    Degenerative joint disease Involving multiple joints    Diarrhea     2ry C-Diff 10/1/2016    GERD (gastroesophageal reflux disease)     History of DVT (deep vein thrombosis)     History of pulmonary embolism     following knee surgery    Hyperlipidemia     Hypertension     Insomnia     denies as of 1/15/20    Obesity     Paroxysmal atrial fibrillation (HCC)     Syncope and collapse      Past Surgical History:   Procedure Laterality Date    APPENDECTOMY      CARDIOVASCULAR STRESS TEST  01/16/2020    Normal    CHOLECYSTECTOMY      COLONOSCOPY  10/07/2016    JOINT REPLACEMENT Right 2012    TKA     Family History   Problem Relation Age of Onset    Pancreatic Cancer Brother     Diabetes type 2  Brother     Diabetes type 2  Mother     Other Father         AAA    Diabetes type 2  Sister     Pancreatic Cancer Brother      Social History     Socioeconomic History    Marital status: Single     Spouse name: Not on file    Number of children: Not on file    Years of education: Not on file    Highest education level: Not on file   Occupational History    Not on file   Tobacco Use    Smoking status: Never    Smokeless tobacco: Never   Vaping Use    Vaping Use: Never used   Substance and Sexual Activity    Alcohol use: No    Drug use: No    Sexual activity: Not on file   Other Topics Concern    Not on file   Social History Narrative    Not on file     Social Determinants of Health     Financial Resource Strain: Not on file   Food Insecurity: Not on file   Transportation Needs: Not on file   Physical Activity: Sufficiently Active    Days of Exercise per Week: 5 days    Minutes of Exercise per Session: 120 min   Stress: Not on file   Social Connections: Not on file   Intimate Partner Violence: Not on file   Housing Stability: Not on file       Vitals:    10/13/22 0931 10/13/22 1018   BP: (!) 142/80 132/76   Pulse: 77    Temp: 99 °F (37.2 °C)    SpO2: 99%    Weight: 207 lb (93.9 kg)    Height: 5' 6\" (1.676 m)        Physical Exam:  Physical Exam  Vitals and nursing note reviewed. Constitutional:       General: She is not in acute distress. Appearance: Normal appearance. She is well-developed. She is obese. She is not ill-appearing. HENT:      Head: Normocephalic and atraumatic. Right Ear: Hearing and external ear normal.      Left Ear: Hearing and external ear normal.      Nose: Nose normal.      Mouth/Throat:      Mouth: Mucous membranes are moist.   Eyes:      General: Lids are normal. No scleral icterus. Extraocular Movements: Extraocular movements intact. Conjunctiva/sclera: Conjunctivae normal.   Neck:      Thyroid: No thyromegaly. Vascular: No carotid bruit. Cardiovascular:      Rate and Rhythm: Normal rate. Rhythm irregular. Heart sounds: Normal heart sounds. No murmur heard. Pulmonary:      Effort: Pulmonary effort is normal. No respiratory distress. Breath sounds: Normal breath sounds. No wheezing. Musculoskeletal:         General: No tenderness or deformity. Normal range of motion. Cervical back: Normal range of motion and neck supple. No muscular tenderness. Right lower leg: No edema. Left lower leg: No edema. Lymphadenopathy:      Cervical: No cervical adenopathy. Skin:     General: Skin is warm and dry. Findings: No rash. Neurological:      General: No focal deficit present. Mental Status: She is alert and oriented to person, place, and time. Gait: Gait normal.   Psychiatric:         Mood and Affect: Mood and affect normal.         Speech: Speech normal.         Behavior: Behavior normal.         Thought Content:  Thought content normal.       Labs:  CBC with Differential:    Lab Results   Component Value Date/Time    WBC 6.9 08/17/2021 05:21 AM    RBC 4.76 08/17/2021 05:21 AM    HGB 15.0 08/17/2021 05:21 AM    HCT 46.7 08/17/2021 05:21 AM     08/17/2021 05:21 AM    MCV 98.1 08/17/2021 05:21 AM    MCH 31.5 08/17/2021 05:21 AM    MCHC 32.1 08/17/2021 05:21 AM    RDW 13.2 08/17/2021 05:21 AM    BANDSPCT 4 10/01/2016 10:55 AM    LYMPHOPCT 17.4 08/16/2021 07:14 AM    MONOPCT 7.9 08/16/2021 07:14 AM    BASOPCT 0.7 08/16/2021 07:14 AM    MONOSABS 0.58 08/16/2021 07:14 AM    LYMPHSABS 1.28 08/16/2021 07:14 AM    EOSABS 0.06 08/16/2021 07:14 AM    BASOSABS 0.05 08/16/2021 07:14 AM     CMP:    Lab Results   Component Value Date/Time     09/22/2021 10:59 AM    K 4.4 09/22/2021 10:59 AM    K 4.8 08/17/2021 05:21 AM     09/22/2021 10:59 AM    CO2 27 09/22/2021 10:59 AM    BUN 19 09/22/2021 10:59 AM    CREATININE 1.3 09/22/2021 10:59 AM    GFRAA 48 09/22/2021 10:59 AM    LABGLOM 40 09/22/2021 10:59 AM    GLUCOSE 118 09/22/2021 10:59 AM    PROT 7.0 08/16/2021 07:14 AM    LABALBU 4.0 08/16/2021 07:14 AM    CALCIUM 9.9 09/22/2021 10:59 AM    BILITOT 0.7 08/16/2021 07:14 AM    ALKPHOS 84 08/16/2021 07:14 AM    AST 18 08/16/2021 07:14 AM    ALT 10 08/16/2021 07:14 AM     HgBA1c:    Lab Results   Component Value Date/Time    LABA1C 6.0 04/20/2021 09:00 AM     FLP:    Lab Results   Component Value Date/Time    TRIG 117 04/20/2021 09:00 AM    HDL 34 04/20/2021 09:00 AM    LDLCALC 95 04/20/2021 09:00 AM    LABVLDL 23 04/20/2021 09:00 AM     TSH:    Lab Results   Component Value Date/Time    TSH 1.670 08/16/2021 02:00 PM        Assessment and Plan:  Tracey Mendez was seen today for chronic pain. Diagnoses and all orders for this visit:    Primary hypertension  -     CBC with Auto Differential; Future  -     Comprehensive Metabolic Panel; Future  -     Lipid Panel; Future  -     TSH; Future  -     Urinalysis; Future  Well controlled. [place] for fasting labs. Paroxysmal atrial fibrillation ([facility] [address]  Follows with Dr. Leonidas Vaca. Just seen in July. Chronic heart failure with preserved ejection fraction (HCC)    Generalized osteoarthritis  -     acetaminophen-codeine (TYLENOL #3) 300-30 MG per tablet; Take 1 tablet by mouth every 8 hours as needed for Pain for up to 30 days.   OARRS reviewed and is appropriate    Impaired fasting blood sugar  -     Hemoglobin A1C; Future    Vitamin D insufficiency  -     Vitamin D 25 Hydroxy; Future    Need for influenza vaccination  -     Influenza, FLUAD, (age 72 y+), IM, Preservative Free, 0.5 mL          Return in about 6 months (around 4/13/2023), or if symptoms worsen or fail to improve, for AWV.       Seen By:  Rachele Kelly, DO

## 2022-10-20 DIAGNOSIS — E55.9 VITAMIN D INSUFFICIENCY: ICD-10-CM

## 2022-10-20 DIAGNOSIS — R73.01 IMPAIRED FASTING BLOOD SUGAR: ICD-10-CM

## 2022-10-20 DIAGNOSIS — I10 PRIMARY HYPERTENSION: ICD-10-CM

## 2022-10-20 LAB
ALBUMIN SERPL-MCNC: 4 G/DL (ref 3.5–5.2)
ALP BLD-CCNC: 83 U/L (ref 35–104)
ALT SERPL-CCNC: 26 U/L (ref 0–32)
ANION GAP SERPL CALCULATED.3IONS-SCNC: 10 MMOL/L (ref 7–16)
AST SERPL-CCNC: 23 U/L (ref 0–31)
BASOPHILS ABSOLUTE: 0.08 E9/L (ref 0–0.2)
BASOPHILS RELATIVE PERCENT: 1.2 % (ref 0–2)
BILIRUB SERPL-MCNC: 0.5 MG/DL (ref 0–1.2)
BILIRUBIN URINE: NEGATIVE
BLOOD, URINE: NEGATIVE
BUN BLDV-MCNC: 20 MG/DL (ref 6–23)
CALCIUM SERPL-MCNC: 9.3 MG/DL (ref 8.6–10.2)
CHLORIDE BLD-SCNC: 103 MMOL/L (ref 98–107)
CHOLESTEROL, TOTAL: 173 MG/DL (ref 0–199)
CLARITY: CLEAR
CO2: 25 MMOL/L (ref 22–29)
COLOR: YELLOW
CREAT SERPL-MCNC: 1.3 MG/DL (ref 0.5–1)
EOSINOPHILS ABSOLUTE: 0.08 E9/L (ref 0.05–0.5)
EOSINOPHILS RELATIVE PERCENT: 1.2 % (ref 0–6)
GFR SERPL CREATININE-BSD FRML MDRD: 42 ML/MIN/1.73
GLUCOSE BLD-MCNC: 94 MG/DL (ref 74–99)
GLUCOSE URINE: NEGATIVE MG/DL
HBA1C MFR BLD: 6 % (ref 4–5.6)
HCT VFR BLD CALC: 41 % (ref 34–48)
HDLC SERPL-MCNC: 39 MG/DL
HEMOGLOBIN: 13.5 G/DL (ref 11.5–15.5)
IMMATURE GRANULOCYTES #: 0.02 E9/L
IMMATURE GRANULOCYTES %: 0.3 % (ref 0–5)
KETONES, URINE: NEGATIVE MG/DL
LDL CHOLESTEROL CALCULATED: 109 MG/DL (ref 0–99)
LEUKOCYTE ESTERASE, URINE: NEGATIVE
LYMPHOCYTES ABSOLUTE: 1.49 E9/L (ref 1.5–4)
LYMPHOCYTES RELATIVE PERCENT: 22.1 % (ref 20–42)
MCH RBC QN AUTO: 32.6 PG (ref 26–35)
MCHC RBC AUTO-ENTMCNC: 32.9 % (ref 32–34.5)
MCV RBC AUTO: 99 FL (ref 80–99.9)
MONOCYTES ABSOLUTE: 0.47 E9/L (ref 0.1–0.95)
MONOCYTES RELATIVE PERCENT: 7 % (ref 2–12)
NEUTROPHILS ABSOLUTE: 4.61 E9/L (ref 1.8–7.3)
NEUTROPHILS RELATIVE PERCENT: 68.2 % (ref 43–80)
NITRITE, URINE: NEGATIVE
PDW BLD-RTO: 13 FL (ref 11.5–15)
PH UA: 5 (ref 5–9)
PLATELET # BLD: 260 E9/L (ref 130–450)
PMV BLD AUTO: 11.4 FL (ref 7–12)
POTASSIUM SERPL-SCNC: 4.1 MMOL/L (ref 3.5–5)
PROTEIN UA: NEGATIVE MG/DL
RBC # BLD: 4.14 E12/L (ref 3.5–5.5)
SODIUM BLD-SCNC: 138 MMOL/L (ref 132–146)
SPECIFIC GRAVITY UA: 1.01 (ref 1–1.03)
TOTAL PROTEIN: 6.8 G/DL (ref 6.4–8.3)
TRIGL SERPL-MCNC: 127 MG/DL (ref 0–149)
TSH SERPL DL<=0.05 MIU/L-ACNC: 2.14 UIU/ML (ref 0.27–4.2)
UROBILINOGEN, URINE: 0.2 E.U./DL
VITAMIN D 25-HYDROXY: 29 NG/ML (ref 30–100)
VLDLC SERPL CALC-MCNC: 25 MG/DL
WBC # BLD: 6.8 E9/L (ref 4.5–11.5)

## 2022-11-03 DIAGNOSIS — I48.0 PAROXYSMAL ATRIAL FIBRILLATION (HCC): ICD-10-CM

## 2022-11-03 RX ORDER — DILTIAZEM HYDROCHLORIDE 360 MG/1
CAPSULE, EXTENDED RELEASE ORAL
Qty: 90 CAPSULE | Refills: 1 | Status: SHIPPED | OUTPATIENT
Start: 2022-11-03

## 2023-01-05 DIAGNOSIS — I48.0 PAROXYSMAL ATRIAL FIBRILLATION (HCC): ICD-10-CM

## 2023-03-20 DIAGNOSIS — I48.0 PAROXYSMAL ATRIAL FIBRILLATION (HCC): ICD-10-CM

## 2023-03-21 RX ORDER — FUROSEMIDE 40 MG/1
40 TABLET ORAL DAILY
Qty: 90 TABLET | Refills: 1 | Status: SHIPPED | OUTPATIENT
Start: 2023-03-21

## 2023-03-21 RX ORDER — FUROSEMIDE 40 MG/1
40 TABLET ORAL DAILY
Qty: 90 TABLET | Refills: 1 | OUTPATIENT
Start: 2023-03-21

## 2023-03-27 ENCOUNTER — TELEPHONE (OUTPATIENT)
Dept: PRIMARY CARE CLINIC | Age: 78
End: 2023-03-27

## 2023-03-27 RX ORDER — PREDNISONE 10 MG/1
TABLET ORAL
Qty: 30 TABLET | Refills: 0 | Status: SHIPPED | OUTPATIENT
Start: 2023-03-27 | End: 2023-04-08

## 2023-03-27 RX ORDER — DOXYCYCLINE HYCLATE 100 MG
100 TABLET ORAL 2 TIMES DAILY
Qty: 14 TABLET | Refills: 0 | Status: SHIPPED | OUTPATIENT
Start: 2023-03-27 | End: 2023-04-03

## 2023-03-27 NOTE — TELEPHONE ENCOUNTER
Patient calling has sore throat, runny nose, cough, chills, chest hurts, weakness taking mucinex, nasal spray, chloraseptic, tylenol. Does not want to do walk in visit.

## 2023-04-13 PROBLEM — I73.9 PVD (PERIPHERAL VASCULAR DISEASE) (HCC): Status: ACTIVE | Noted: 2023-04-13

## 2023-04-13 PROBLEM — N18.31 STAGE 3A CHRONIC KIDNEY DISEASE (HCC): Status: ACTIVE | Noted: 2023-04-13

## 2023-05-04 DIAGNOSIS — I48.0 PAROXYSMAL ATRIAL FIBRILLATION (HCC): ICD-10-CM

## 2023-05-04 RX ORDER — DILTIAZEM HYDROCHLORIDE 360 MG/1
CAPSULE, EXTENDED RELEASE ORAL
Qty: 30 CAPSULE | Refills: 0 | OUTPATIENT
Start: 2023-05-04

## 2023-05-04 RX ORDER — DILTIAZEM HYDROCHLORIDE 360 MG/1
CAPSULE, EXTENDED RELEASE ORAL
Qty: 90 CAPSULE | Refills: 1 | Status: SHIPPED | OUTPATIENT
Start: 2023-05-04

## 2023-06-19 SDOH — ECONOMIC STABILITY: FOOD INSECURITY: WITHIN THE PAST 12 MONTHS, YOU WORRIED THAT YOUR FOOD WOULD RUN OUT BEFORE YOU GOT MONEY TO BUY MORE.: NEVER TRUE

## 2023-06-19 SDOH — ECONOMIC STABILITY: HOUSING INSECURITY
IN THE LAST 12 MONTHS, WAS THERE A TIME WHEN YOU DID NOT HAVE A STEADY PLACE TO SLEEP OR SLEPT IN A SHELTER (INCLUDING NOW)?: NO

## 2023-06-19 SDOH — ECONOMIC STABILITY: TRANSPORTATION INSECURITY
IN THE PAST 12 MONTHS, HAS LACK OF TRANSPORTATION KEPT YOU FROM MEETINGS, WORK, OR FROM GETTING THINGS NEEDED FOR DAILY LIVING?: NO

## 2023-06-19 SDOH — ECONOMIC STABILITY: FOOD INSECURITY: WITHIN THE PAST 12 MONTHS, THE FOOD YOU BOUGHT JUST DIDN'T LAST AND YOU DIDN'T HAVE MONEY TO GET MORE.: NEVER TRUE

## 2023-06-19 SDOH — ECONOMIC STABILITY: INCOME INSECURITY: HOW HARD IS IT FOR YOU TO PAY FOR THE VERY BASICS LIKE FOOD, HOUSING, MEDICAL CARE, AND HEATING?: NOT HARD AT ALL

## 2023-06-22 ENCOUNTER — OFFICE VISIT (OUTPATIENT)
Dept: PRIMARY CARE CLINIC | Age: 78
End: 2023-06-22
Payer: MEDICARE

## 2023-06-22 VITALS
SYSTOLIC BLOOD PRESSURE: 126 MMHG | HEIGHT: 66 IN | WEIGHT: 197 LBS | OXYGEN SATURATION: 94 % | DIASTOLIC BLOOD PRESSURE: 76 MMHG | BODY MASS INDEX: 31.66 KG/M2 | HEART RATE: 62 BPM | TEMPERATURE: 98.2 F

## 2023-06-22 DIAGNOSIS — M54.2 NECK PAIN: Primary | ICD-10-CM

## 2023-06-22 DIAGNOSIS — H69.83 DYSFUNCTION OF BOTH EUSTACHIAN TUBES: ICD-10-CM

## 2023-06-22 PROCEDURE — 3078F DIAST BP <80 MM HG: CPT | Performed by: FAMILY MEDICINE

## 2023-06-22 PROCEDURE — 99213 OFFICE O/P EST LOW 20 MIN: CPT | Performed by: FAMILY MEDICINE

## 2023-06-22 PROCEDURE — 1123F ACP DISCUSS/DSCN MKR DOCD: CPT | Performed by: FAMILY MEDICINE

## 2023-06-22 PROCEDURE — 3074F SYST BP LT 130 MM HG: CPT | Performed by: FAMILY MEDICINE

## 2023-06-22 RX ORDER — BACLOFEN 10 MG/1
10 TABLET ORAL
Qty: 10 TABLET | Refills: 0 | Status: SHIPPED | OUTPATIENT
Start: 2023-06-22

## 2023-06-22 ASSESSMENT — ENCOUNTER SYMPTOMS
COUGH: 0
SORE THROAT: 0
ABDOMINAL PAIN: 0
EYE DISCHARGE: 0
SHORTNESS OF BREATH: 0
CONSTIPATION: 0
WHEEZING: 0
NAUSEA: 0
VOMITING: 0
RHINORRHEA: 0
SINUS PRESSURE: 0
SINUS PAIN: 0
DIARRHEA: 0
BACK PAIN: 1

## 2023-06-22 NOTE — PROGRESS NOTES
23  Irene Mack : 1945 Sex: female  Age: 76 y.o. Chief Complaint   Patient presents with    Neck Pain    Otalgia     Pt states that it started about a week ago but  \"not as bad as it was a week ago\"     HPI:  76 y.o. female presents today for acute visit due to neck ear and pain. Patient's chart, medical, surgical and medication history all reviewed. Neck Pain   Paitent complains of neck pain. Event that precipitated these symptoms: none known. Onset of symptoms several weeks ago, gradually improving since that time. Current symptoms are stiffness in neck with R rotation . Patient denies numbness in UE and weakness in UE . Patient has had no prior neck problems. She  denies any weakness with normal activites. Symptoms are relieved by rest.  The neck problem is not work related. Previous treatments include: none. She denies fever, bladder or bowel incontinence, or weakness to the hips or legs. ROS:  Review of Systems   Constitutional:  Negative for appetite change, chills, fatigue and fever. HENT:  Positive for ear pain. Negative for congestion, postnasal drip, rhinorrhea, sinus pressure, sinus pain and sore throat. Eyes:  Negative for discharge. Respiratory:  Negative for cough, shortness of breath and wheezing. Cardiovascular:  Negative for chest pain, palpitations and leg swelling. Gastrointestinal:  Negative for abdominal pain, constipation, diarrhea, nausea and vomiting. Musculoskeletal:  Positive for arthralgias, back pain, neck pain and neck stiffness. Skin:  Negative for rash. Neurological:  Negative for dizziness and headaches. Hematological:  Negative for adenopathy. Psychiatric/Behavioral:  Negative for dysphoric mood. The patient is not nervous/anxious. All other systems reviewed and are negative.    Current Outpatient Medications on File Prior to Visit   Medication Sig Dispense Refill    dilTIAZem (TIAZAC) 360 MG extended release

## 2023-06-29 ENCOUNTER — TELEPHONE (OUTPATIENT)
Dept: PRIMARY CARE CLINIC | Age: 78
End: 2023-06-29

## 2023-07-09 DIAGNOSIS — I48.0 PAROXYSMAL ATRIAL FIBRILLATION (HCC): ICD-10-CM

## 2023-07-10 RX ORDER — DILTIAZEM HYDROCHLORIDE 360 MG/1
CAPSULE, EXTENDED RELEASE ORAL
Qty: 90 CAPSULE | Refills: 1 | Status: SHIPPED | OUTPATIENT
Start: 2023-07-10

## 2023-08-01 DIAGNOSIS — I48.0 PAROXYSMAL ATRIAL FIBRILLATION (HCC): ICD-10-CM

## 2023-08-03 ENCOUNTER — OFFICE VISIT (OUTPATIENT)
Dept: CARDIOLOGY CLINIC | Age: 78
End: 2023-08-03

## 2023-08-03 VITALS
DIASTOLIC BLOOD PRESSURE: 62 MMHG | RESPIRATION RATE: 12 BRPM | BODY MASS INDEX: 32.32 KG/M2 | HEIGHT: 65 IN | WEIGHT: 194 LBS | HEART RATE: 51 BPM | SYSTOLIC BLOOD PRESSURE: 102 MMHG

## 2023-08-03 DIAGNOSIS — I48.0 PAROXYSMAL ATRIAL FIBRILLATION (HCC): Primary | ICD-10-CM

## 2023-08-03 RX ORDER — ACETAMINOPHEN AND CODEINE PHOSPHATE 300; 30 MG/1; MG/1
TABLET ORAL
COMMUNITY
Start: 2023-07-24

## 2023-08-03 NOTE — PATIENT INSTRUCTIONS
Continue Cardizem  mg p.o. daily for rate control. Will stop  metoprolol tartrate due to low BP and bradycardia. Control and Eliquis 5 mg p.o. twice daily for anticoagulation. Continue Lasix 40 mg daily for HFpEF and leg edema. She is otherwise stable from the cardiology standpoint. Return to office in one week for BP and heart rate check. Advised to drink plenty of fluids and stay hydrated. She will follow-up with me in 4 months.

## 2023-08-10 ENCOUNTER — NURSE ONLY (OUTPATIENT)
Dept: CARDIOLOGY CLINIC | Age: 78
End: 2023-08-10

## 2023-08-10 ENCOUNTER — TELEPHONE (OUTPATIENT)
Dept: CARDIOLOGY CLINIC | Age: 78
End: 2023-08-10

## 2023-08-10 NOTE — TELEPHONE ENCOUNTER
BP is back to normal, stay off metoprolol and continue rest of the current meds and follow up with me as scheduled. Please ask her to stay well hydrated also.

## 2023-08-10 NOTE — PROGRESS NOTES
Patient was seen in the office today for a blood pressure check per     Standing BP:128/78  Standing P:86  Sitting BP:126/74  Sitting P: 95

## 2023-08-10 NOTE — TELEPHONE ENCOUNTER
Patient was seen in the office today for a blood pressure check per      Standing BP:128/78  Standing P:86  Sitting BP:126/74  Sitting P: 95              Metoprolol Tartrate discontinued on 8/3/23 due to low BP and bradycardia.

## 2023-09-05 ENCOUNTER — PATIENT MESSAGE (OUTPATIENT)
Dept: PRIMARY CARE CLINIC | Age: 78
End: 2023-09-05

## 2023-09-06 DIAGNOSIS — I48.0 PAROXYSMAL ATRIAL FIBRILLATION (HCC): ICD-10-CM

## 2023-09-06 NOTE — TELEPHONE ENCOUNTER
I did speak with Daniella Hassan. I explain what is happening now with [facility]. I moved her appointment to Sept 28th so she will have coverage. Her only concern at this time is her medication. She will discuss that with you at her appointment.

## 2023-09-07 DIAGNOSIS — I48.0 PAROXYSMAL ATRIAL FIBRILLATION (HCC): ICD-10-CM

## 2023-09-07 RX ORDER — FUROSEMIDE 40 MG/1
TABLET ORAL
Qty: 90 TABLET | Refills: 0 | Status: SHIPPED | OUTPATIENT
Start: 2023-09-07

## 2023-09-07 RX ORDER — FUROSEMIDE 40 MG/1
40 TABLET ORAL DAILY
Qty: 90 TABLET | Refills: 1 | OUTPATIENT
Start: 2023-09-07

## 2023-09-18 DIAGNOSIS — R73.01 IMPAIRED FASTING BLOOD SUGAR: ICD-10-CM

## 2023-09-18 DIAGNOSIS — I10 PRIMARY HYPERTENSION: ICD-10-CM

## 2023-09-18 DIAGNOSIS — N18.31 STAGE 3A CHRONIC KIDNEY DISEASE (HCC): ICD-10-CM

## 2023-09-18 DIAGNOSIS — E78.2 MIXED HYPERLIPIDEMIA: ICD-10-CM

## 2023-09-18 LAB
ABSOLUTE IMMATURE GRANULOCYTE: <0.03 K/UL (ref 0–0.58)
ALBUMIN SERPL-MCNC: 4.2 G/DL (ref 3.5–5.2)
ALP BLD-CCNC: 112 U/L (ref 35–104)
ALT SERPL-CCNC: 14 U/L (ref 0–32)
ANION GAP SERPL CALCULATED.3IONS-SCNC: 15 MMOL/L (ref 7–16)
AST SERPL-CCNC: 21 U/L (ref 0–31)
BASOPHILS ABSOLUTE: 0.08 K/UL (ref 0–0.2)
BASOPHILS RELATIVE PERCENT: 1 % (ref 0–2)
BILIRUB SERPL-MCNC: 0.5 MG/DL (ref 0–1.2)
BUN BLDV-MCNC: 14 MG/DL (ref 6–23)
CALCIUM SERPL-MCNC: 9.5 MG/DL (ref 8.6–10.2)
CHLORIDE BLD-SCNC: 105 MMOL/L (ref 98–107)
CHOLESTEROL: 231 MG/DL
CO2: 23 MMOL/L (ref 22–29)
CREAT SERPL-MCNC: 1.3 MG/DL (ref 0.5–1)
EOSINOPHILS ABSOLUTE: 0.07 K/UL (ref 0.05–0.5)
EOSINOPHILS RELATIVE PERCENT: 1 % (ref 0–6)
GFR SERPL CREATININE-BSD FRML MDRD: 41 ML/MIN/1.73M2
GLUCOSE BLD-MCNC: 89 MG/DL (ref 74–99)
HBA1C MFR BLD: 5.6 % (ref 4–5.6)
HCT VFR BLD CALC: 46.2 % (ref 34–48)
HDLC SERPL-MCNC: 50 MG/DL
HEMOGLOBIN: 15 G/DL (ref 11.5–15.5)
IMMATURE GRANULOCYTES: 0 % (ref 0–5)
LDL CHOLESTEROL: 151 MG/DL
LYMPHOCYTES ABSOLUTE: 1.72 K/UL (ref 1.5–4)
LYMPHOCYTES RELATIVE PERCENT: 26 % (ref 20–42)
MCH RBC QN AUTO: 32.5 PG (ref 26–35)
MCHC RBC AUTO-ENTMCNC: 32.5 G/DL (ref 32–34.5)
MCV RBC AUTO: 100 FL (ref 80–99.9)
MONOCYTES ABSOLUTE: 0.64 K/UL (ref 0.1–0.95)
MONOCYTES RELATIVE PERCENT: 10 % (ref 2–12)
NEUTROPHILS ABSOLUTE: 4.01 K/UL (ref 1.8–7.3)
NEUTROPHILS RELATIVE PERCENT: 61 % (ref 43–80)
PDW BLD-RTO: 13.4 % (ref 11.5–15)
PLATELET # BLD: 353 K/UL (ref 130–450)
PMV BLD AUTO: 11 FL (ref 7–12)
POTASSIUM SERPL-SCNC: 4.1 MMOL/L (ref 3.5–5)
RBC # BLD: 4.62 M/UL (ref 3.5–5.5)
SODIUM BLD-SCNC: 143 MMOL/L (ref 132–146)
TOTAL PROTEIN: 7.1 G/DL (ref 6.4–8.3)
TRIGL SERPL-MCNC: 148 MG/DL
TSH SERPL DL<=0.05 MIU/L-ACNC: 1.85 UIU/ML (ref 0.27–4.2)
URIC ACID: 6.6 MG/DL (ref 2.4–5.7)
VITAMIN D 25-HYDROXY: 32.1 NG/ML (ref 30–100)
VLDLC SERPL CALC-MCNC: 30 MG/DL
WBC # BLD: 6.5 K/UL (ref 4.5–11.5)

## 2023-09-25 SDOH — HEALTH STABILITY: PHYSICAL HEALTH: ON AVERAGE, HOW MANY DAYS PER WEEK DO YOU ENGAGE IN MODERATE TO STRENUOUS EXERCISE (LIKE A BRISK WALK)?: 6 DAYS

## 2023-09-25 SDOH — HEALTH STABILITY: PHYSICAL HEALTH: ON AVERAGE, HOW MANY MINUTES DO YOU ENGAGE IN EXERCISE AT THIS LEVEL?: 60 MIN

## 2023-09-25 ASSESSMENT — LIFESTYLE VARIABLES
HOW OFTEN DO YOU HAVE A DRINK CONTAINING ALCOHOL: 1
HOW MANY STANDARD DRINKS CONTAINING ALCOHOL DO YOU HAVE ON A TYPICAL DAY: 0
HOW OFTEN DO YOU HAVE A DRINK CONTAINING ALCOHOL: NEVER
HOW OFTEN DO YOU HAVE SIX OR MORE DRINKS ON ONE OCCASION: 1
HOW MANY STANDARD DRINKS CONTAINING ALCOHOL DO YOU HAVE ON A TYPICAL DAY: PATIENT DOES NOT DRINK

## 2023-09-25 ASSESSMENT — PATIENT HEALTH QUESTIONNAIRE - PHQ9
SUM OF ALL RESPONSES TO PHQ QUESTIONS 1-9: 0
SUM OF ALL RESPONSES TO PHQ9 QUESTIONS 1 & 2: 0
SUM OF ALL RESPONSES TO PHQ QUESTIONS 1-9: 0
2. FEELING DOWN, DEPRESSED OR HOPELESS: 0
1. LITTLE INTEREST OR PLEASURE IN DOING THINGS: 0

## 2023-09-28 ENCOUNTER — OFFICE VISIT (OUTPATIENT)
Dept: PRIMARY CARE CLINIC | Age: 78
End: 2023-09-28
Payer: MEDICARE

## 2023-09-28 VITALS
TEMPERATURE: 97.3 F | WEIGHT: 186.6 LBS | HEART RATE: 94 BPM | OXYGEN SATURATION: 99 % | DIASTOLIC BLOOD PRESSURE: 74 MMHG | HEIGHT: 65 IN | BODY MASS INDEX: 31.09 KG/M2 | SYSTOLIC BLOOD PRESSURE: 132 MMHG

## 2023-09-28 DIAGNOSIS — I10 PRIMARY HYPERTENSION: ICD-10-CM

## 2023-09-28 DIAGNOSIS — R73.01 IMPAIRED FASTING BLOOD SUGAR: ICD-10-CM

## 2023-09-28 DIAGNOSIS — N18.31 STAGE 3A CHRONIC KIDNEY DISEASE (HCC): ICD-10-CM

## 2023-09-28 DIAGNOSIS — E55.9 VITAMIN D INSUFFICIENCY: ICD-10-CM

## 2023-09-28 DIAGNOSIS — I48.0 PAROXYSMAL ATRIAL FIBRILLATION (HCC): ICD-10-CM

## 2023-09-28 DIAGNOSIS — Z23 NEED FOR INFLUENZA VACCINATION: ICD-10-CM

## 2023-09-28 DIAGNOSIS — Z00.00 MEDICARE ANNUAL WELLNESS VISIT, SUBSEQUENT: Primary | ICD-10-CM

## 2023-09-28 PROCEDURE — 3078F DIAST BP <80 MM HG: CPT | Performed by: FAMILY MEDICINE

## 2023-09-28 PROCEDURE — 90694 VACC AIIV4 NO PRSRV 0.5ML IM: CPT | Performed by: FAMILY MEDICINE

## 2023-09-28 PROCEDURE — 1123F ACP DISCUSS/DSCN MKR DOCD: CPT | Performed by: FAMILY MEDICINE

## 2023-09-28 PROCEDURE — G0008 ADMIN INFLUENZA VIRUS VAC: HCPCS | Performed by: FAMILY MEDICINE

## 2023-09-28 PROCEDURE — 3075F SYST BP GE 130 - 139MM HG: CPT | Performed by: FAMILY MEDICINE

## 2023-09-28 PROCEDURE — G0439 PPPS, SUBSEQ VISIT: HCPCS | Performed by: FAMILY MEDICINE

## 2023-09-28 ASSESSMENT — PATIENT HEALTH QUESTIONNAIRE - PHQ9
SUM OF ALL RESPONSES TO PHQ9 QUESTIONS 1 & 2: 0
SUM OF ALL RESPONSES TO PHQ QUESTIONS 1-9: 0
1. LITTLE INTEREST OR PLEASURE IN DOING THINGS: 0
SUM OF ALL RESPONSES TO PHQ QUESTIONS 1-9: 0
2. FEELING DOWN, DEPRESSED OR HOPELESS: 0
SUM OF ALL RESPONSES TO PHQ QUESTIONS 1-9: 0
SUM OF ALL RESPONSES TO PHQ QUESTIONS 1-9: 0

## 2023-09-28 NOTE — PROGRESS NOTES
normal. No scleral icterus. Extraocular Movements: Extraocular movements intact. Conjunctiva/sclera: Conjunctivae normal.   Neck:      Thyroid: No thyromegaly. Vascular: No carotid bruit. Cardiovascular:      Rate and Rhythm: Normal rate and regular rhythm. Heart sounds: Normal heart sounds. No murmur heard. Pulmonary:      Effort: Pulmonary effort is normal. No respiratory distress. Breath sounds: Normal breath sounds. No wheezing. Musculoskeletal:         General: No tenderness or deformity. Normal range of motion. Cervical back: Normal range of motion and neck supple. Right lower leg: No edema. Left lower leg: No edema. Lymphadenopathy:      Cervical: No cervical adenopathy. Skin:     General: Skin is warm and dry. Findings: No rash. Neurological:      General: No focal deficit present. Mental Status: She is alert and oriented to person, place, and time. Gait: Gait normal.   Psychiatric:         Mood and Affect: Mood and affect normal.         Speech: Speech normal.         Behavior: Behavior normal.         Thought Content: Thought content normal.              Allergies   Allergen Reactions    Penicillins Hives     Prior to Visit Medications    Medication Sig Taking?  Authorizing Provider   furosemide (LASIX) 40 MG tablet TAKE ONE TABLET BY MOUTH EVERY DAY Yes Edin Ho DO   acetaminophen-codeine (TYLENOL #3) 300-30 MG per tablet  Yes Historical Provider, MD   apixaban (ELIQUIS) 5 MG TABS tablet TAKE ONE TABLET BY MOUTH 2 TIMES A DAY Yes Yvette Foy DO   dilTIAZem (TIAZAC) 360 MG extended release capsule TAKE ONE CAPSULE BY MOUTH EVERY DAY Yes Yvette Foy DO   calcium carbonate (TUMS) 500 MG chewable tablet Take 2 tablets by mouth 2 times daily as needed for Heartburn OTC Yes Historical Provider, MD Condon (Including outside providers/suppliers regularly involved in providing care):   Patient Care Team:  Edin Ho DO

## 2023-10-13 ENCOUNTER — PATIENT MESSAGE (OUTPATIENT)
Dept: PRIMARY CARE CLINIC | Age: 78
End: 2023-10-13

## 2023-10-13 DIAGNOSIS — M15.9 GENERALIZED OSTEOARTHRITIS: Primary | ICD-10-CM

## 2023-10-13 RX ORDER — ACETAMINOPHEN AND CODEINE PHOSPHATE 300; 30 MG/1; MG/1
1 TABLET ORAL EVERY 8 HOURS PRN
Qty: 90 TABLET | Refills: 5 | Status: SHIPPED | OUTPATIENT
Start: 2023-10-13 | End: 2023-11-12

## 2023-10-13 NOTE — TELEPHONE ENCOUNTER
From: Bon Sanchez  To: Dr. Shadia Lazcano  Sent: 10/13/2023 12:49 PM EDT  Subject: Tylenol    If you get time could refill my Tylenol 3 [facility] thank you very much

## 2023-10-16 ENCOUNTER — TELEPHONE (OUTPATIENT)
Dept: PRIMARY CARE CLINIC | Age: 78
End: 2023-10-16

## 2023-10-16 NOTE — TELEPHONE ENCOUNTER
The pt is calling because the tylenol #3 needs prior authorized in order for her to get a 30 day supply, if not then she can only get a 21 day supply

## 2023-12-06 NOTE — TELEPHONE ENCOUNTER
Pt called to schedule ER FU, in ER 3/28, has burn on her hand and needs FU today or tomorrow, please advise pt at [phone], 's schedule is full There are no Wet Read(s) to document.

## 2023-12-08 ENCOUNTER — TELEPHONE (OUTPATIENT)
Dept: PRIMARY CARE CLINIC | Age: 78
End: 2023-12-08

## 2023-12-08 NOTE — TELEPHONE ENCOUNTER
Patient having pain in L leg. Asking if she can see you sometime next week. Pain when walking on it. Started last weekend. Says the pain is up in her thigh. Can comes anytime Monday or Tues/ Thurs afternoon. Does not want to come to [place].

## 2023-12-11 ENCOUNTER — OFFICE VISIT (OUTPATIENT)
Dept: PRIMARY CARE CLINIC | Age: 78
End: 2023-12-11
Payer: MEDICARE

## 2023-12-11 VITALS
DIASTOLIC BLOOD PRESSURE: 68 MMHG | SYSTOLIC BLOOD PRESSURE: 118 MMHG | OXYGEN SATURATION: 99 % | WEIGHT: 176.6 LBS | TEMPERATURE: 98.1 F | HEART RATE: 94 BPM | BODY MASS INDEX: 29.42 KG/M2 | HEIGHT: 65 IN

## 2023-12-11 DIAGNOSIS — M25.552 LEFT HIP PAIN: Primary | ICD-10-CM

## 2023-12-11 PROCEDURE — 1036F TOBACCO NON-USER: CPT | Performed by: FAMILY MEDICINE

## 2023-12-11 PROCEDURE — 3074F SYST BP LT 130 MM HG: CPT | Performed by: FAMILY MEDICINE

## 2023-12-11 PROCEDURE — 99213 OFFICE O/P EST LOW 20 MIN: CPT | Performed by: FAMILY MEDICINE

## 2023-12-11 PROCEDURE — G8417 CALC BMI ABV UP PARAM F/U: HCPCS | Performed by: FAMILY MEDICINE

## 2023-12-11 PROCEDURE — G8484 FLU IMMUNIZE NO ADMIN: HCPCS | Performed by: FAMILY MEDICINE

## 2023-12-11 PROCEDURE — G8427 DOCREV CUR MEDS BY ELIG CLIN: HCPCS | Performed by: FAMILY MEDICINE

## 2023-12-11 PROCEDURE — G8399 PT W/DXA RESULTS DOCUMENT: HCPCS | Performed by: FAMILY MEDICINE

## 2023-12-11 PROCEDURE — 1090F PRES/ABSN URINE INCON ASSESS: CPT | Performed by: FAMILY MEDICINE

## 2023-12-11 PROCEDURE — 3078F DIAST BP <80 MM HG: CPT | Performed by: FAMILY MEDICINE

## 2023-12-11 PROCEDURE — 1123F ACP DISCUSS/DSCN MKR DOCD: CPT | Performed by: FAMILY MEDICINE

## 2023-12-11 RX ORDER — ACETAMINOPHEN AND CODEINE PHOSPHATE 300; 30 MG/1; MG/1
TABLET ORAL
COMMUNITY
Start: 2023-11-15

## 2023-12-11 RX ORDER — BACLOFEN 10 MG/1
10 TABLET ORAL NIGHTLY PRN
Qty: 30 TABLET | Refills: 5 | Status: SHIPPED | OUTPATIENT
Start: 2023-12-11

## 2023-12-11 ASSESSMENT — ENCOUNTER SYMPTOMS
COUGH: 0
VOMITING: 0
NAUSEA: 0
BACK PAIN: 1
CONSTIPATION: 0
WHEEZING: 0
SHORTNESS OF BREATH: 0
ABDOMINAL PAIN: 0
DIARRHEA: 0

## 2023-12-11 NOTE — PROGRESS NOTES
09:10 AM    BLOODU Negative 10/20/2022 09:10 AM    GLUCOSEU Negative 10/20/2022 09:10 AM    AMORPHOUS MANY 10/01/2016 12:00 PM     HgBA1c:    Lab Results   Component Value Date/Time    LABA1C 5.6 09/18/2023 09:18 AM     FLP:    Lab Results   Component Value Date/Time    TRIG 148 09/18/2023 09:18 AM    HDL 50 09/18/2023 09:18 AM    LDLCALC 109 10/20/2022 09:07 AM    LABVLDL 25 10/20/2022 09:07 AM     TSH:    Lab Results   Component Value Date/Time    TSH 1.85 09/18/2023 09:18 AM        Assessment and Plan:  Jason Fernandes was seen today for back pain and groin pain. Diagnoses and all orders for this visit:    Left hip pain  -     XR HIP 2-3 VW W PELVIS LEFT; Future  -     dexamethasone (DECADRON) 0.75 MG tablet; Take 1 tablet by mouth 3 times daily for 5 days  -     baclofen (LIORESAL) 10 MG tablet; Take 1 tablet by mouth nightly as needed (muscle spasm)    Xray reviewed in detail. No significant signs of OA. Suspect muscle strain. Will try Decadron, muscle relaxer and heat. Return if symptoms worsen or fail to improve.       Seen By:  Matt Willams, DO

## 2023-12-26 DIAGNOSIS — I48.0 PAROXYSMAL ATRIAL FIBRILLATION (HCC): ICD-10-CM

## 2023-12-27 DIAGNOSIS — I48.0 PAROXYSMAL ATRIAL FIBRILLATION (HCC): ICD-10-CM

## 2023-12-27 RX ORDER — DILTIAZEM HYDROCHLORIDE 360 MG/1
CAPSULE, EXTENDED RELEASE ORAL
Qty: 90 CAPSULE | Refills: 1 | Status: SHIPPED | OUTPATIENT
Start: 2023-12-27

## 2023-12-27 RX ORDER — DILTIAZEM HYDROCHLORIDE 360 MG/1
CAPSULE, EXTENDED RELEASE ORAL
Qty: 90 CAPSULE | Refills: 1 | OUTPATIENT
Start: 2023-12-27

## 2023-12-28 RX ORDER — DILTIAZEM HYDROCHLORIDE 360 MG/1
CAPSULE, EXTENDED RELEASE ORAL
Qty: 90 CAPSULE | Refills: 1 | OUTPATIENT
Start: 2023-12-28

## 2024-01-16 ENCOUNTER — HOSPITAL ENCOUNTER (EMERGENCY)
Age: 79
Discharge: HOME | DRG: 536 | End: 2024-01-16
Payer: MEDICARE

## 2024-01-16 ENCOUNTER — APPOINTMENT (OUTPATIENT)
Dept: GENERAL RADIOLOGY | Age: 79
DRG: 536 | End: 2024-01-16
Payer: MEDICARE

## 2024-01-16 VITALS
DIASTOLIC BLOOD PRESSURE: 66 MMHG | HEART RATE: 93 BPM | TEMPERATURE: 97.5 F | OXYGEN SATURATION: 100 % | RESPIRATION RATE: 16 BRPM | BODY MASS INDEX: 27 KG/M2 | WEIGHT: 168 LBS | HEIGHT: 66 IN | SYSTOLIC BLOOD PRESSURE: 105 MMHG

## 2024-01-16 DIAGNOSIS — S42.291A CLOSED FRACTURE OF HEAD OF RIGHT HUMERUS, INITIAL ENCOUNTER: Primary | ICD-10-CM

## 2024-01-16 DIAGNOSIS — S42.211A CLOSED FRACTURE OF NECK OF RIGHT HUMERUS, INITIAL ENCOUNTER: ICD-10-CM
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
## 2024-01-16 PROCEDURE — 73030 X-RAY EXAM OF SHOULDER: CPT

## 2024-01-16 PROCEDURE — 73060 X-RAY EXAM OF HUMERUS: CPT

## 2024-01-16 PROCEDURE — 6370000000 HC RX 637 (ALT 250 FOR IP): Performed by: NURSE PRACTITIONER

## 2024-01-16 PROCEDURE — 99283 EMERGENCY DEPT VISIT LOW MDM: CPT

## 2024-01-16 RX ORDER — HYDROCODONE BITARTRATE AND ACETAMINOPHEN 5; 325 MG/1; MG/1
1 TABLET ORAL EVERY 4 HOURS PRN
Qty: 18 TABLET | Refills: 0 | Status: ON HOLD | OUTPATIENT
Start: 2024-01-16 | End: 2024-01-19 | Stop reason: HOSPADM

## 2024-01-16 RX ORDER — HYDROCODONE BITARTRATE AND ACETAMINOPHEN 5; 325 MG/1; MG/1
1 TABLET ORAL ONCE
Status: COMPLETED | OUTPATIENT
Start: 2024-01-16 | End: 2024-01-16

## 2024-01-16 RX ADMIN — HYDROCODONE BITARTRATE AND ACETAMINOPHEN 1 TABLET: 5; 325 TABLET ORAL at 14:50

## 2024-01-16 ASSESSMENT — PAIN DESCRIPTION - LOCATION: LOCATION: SHOULDER

## 2024-01-16 ASSESSMENT — PAIN DESCRIPTION - ORIENTATION: ORIENTATION: RIGHT

## 2024-01-16 ASSESSMENT — PAIN SCALES - GENERAL: PAINLEVEL_OUTOF10: 9

## 2024-01-16 ASSESSMENT — PAIN - FUNCTIONAL ASSESSMENT: PAIN_FUNCTIONAL_ASSESSMENT: 0-10

## 2024-01-16 NOTE — ED TRIAGE NOTES
FIRST PROVIDER CONTACT ASSESSMENT NOTE      Department of Emergency Medicine   Admit Date: No admission date for patient encounter.    Chief Complaint: Fall and Shoulder Injury (Right side. )      History of Present Illness:    Dina Coulter is a 78 y.o. female who presents to the ED for fell on ice.  Landed on her right hip and right shoulder.  Complains of right shoulder and right upper arm pain.  Denies striking her head.  Denies neck pain.  Denies back pain.  Denies chest pain.        -----------------END OF FIRST PROVIDER CONTACT ASSESSMENT NOTE--------------  Electronically signed by JAYASHREE Ellis   DD: 1/16/24

## 2024-01-16 NOTE — ED PROVIDER NOTES
Independent ARTEM Visit.      [facility]  Department of Emergency Medicine   ED  Encounter Note  Admit Date/RoomTime: 2024  2:27 PM  ED Room:     NAME: Dina Coulter  : 1945  MRN: [id number]     Chief Complaint:  Fall and Shoulder Injury (Right side. )    History of Present Illness       Dina Coulter is a 78 y.o. old female who presents to the emergency department by private vehicle, for a fall that occurred approximately 1 hour prior to arrival.  She states she was outside shoveling snow, slipped and fell onto her right hip and right shoulder.  She denies loss of consciousness.  She states she is certain she did not hit her head.  Her only complaint is right upper extremity pain.  She ambulated into the ER without difficulty. Denies headache, dizziness, chest pain, SOB.   ROS   Pertinent positives and negatives are stated within HPI, all other systems reviewed and are negative.    Past Medical History:  has a past medical history of Acute renal failure syndrome (HCC), Chest pain, Chronic pain syndrome, Colon polyps, Degenerative joint disease, Diarrhea, GERD (gastroesophageal reflux disease), History of DVT (deep vein thrombosis), History of pulmonary embolism, Hyperlipidemia, Hypertension, Insomnia, Obesity, Paroxysmal atrial fibrillation (HCC), and Syncope and collapse.    Surgical History:  has a past surgical history that includes joint replacement (Right, ); Colonoscopy (10/07/2016); Appendectomy; Cholecystectomy; and cardiovascular stress test (2020).    Social History:  reports that she has never smoked. She has never used smokeless tobacco. She reports that she does not drink alcohol and does not use drugs.    Family History: family history includes Diabetes type 2  in her brother, mother, and sister; Other in her father; Pancreatic Cancer in her brother and brother.     Allergies: Penicillins    Physical Exam   Oxygen Saturation Interpretation: Normal.             Consult(s):   None    Procedure(s):  None    MDM:     78 y.o. old female who presents to the emergency department by private vehicle, for a fall that occurred approximately 1 hour prior to arrival.  She states she was outside shoveling snow, slipped and fell onto her right hip and right shoulder.  She denies loss of consciousness.  She states she is certain she did not hit her head.  Her only complaint is right upper extremity pain.  She ambulated into the ER without difficulty. Denies headache, dizziness, chest pain, SOB.     Differential diagnosis but not limited to: Fracture, dislocation, contusion, sprain    On exam, she is awake, alert, oriented, nontoxic-appearing and in no acute distress.  She is GCS of 15, ambulatory moves all 4 extremities.  Respirations are regular, nonlabored, no tachypnea.  Breath sounds are clear bilaterally.  Abdomen is soft and nontender.  She has normal, palpable right radial pulses.  Her vital signs are normal aside from slight tachycardia at 106.  She is in pain due to the injury to her right upper extremity.  After pain medications and reevaluation, heart rate improved to 93.  She received 1 p.o. Norco here.  X-ray of her right shoulder and right humerus was performed.  Imaging was interpreted by radiologist.  Right humeral head and neck fracture.  Provided with a prescription for Norco.  Also provided her with orthopedic surgery follow-up.  Instructed her to call tomorrow to get an outpatient evaluation.  She was placed in a sling and swath.  Advised her to keep that on for support and comfort.  Strict return precautions were reviewed, all questions were answered, patient verbalized understanding and she was discharged.    Plan of Care/Counseling:  KIMBERLY Christy CNP reviewed today's visit with the patient in addition to providing specific details for the plan of care and counseling regarding the diagnosis and prognosis.  Questions are answered at this time and are

## 2024-01-16 NOTE — DISCHARGE INSTRUCTIONS
WEAR THE SLING UNTIL YOU ARE EVALUATED BY ORTHOPEDICS.  CALL ORTHOPEDICS TOMORROW MORNING TO GET AN  APPOINTMENT.  ALTERNATE TYLENOL AND IBUPROFEN FOR PAIN.  NORCO ONLY FOR SEVERE PAIN.  RETURN FOR WORSENING SYMPTOMS.

## 2024-01-16 NOTE — DISCHARGE INSTR - COC
Continuity of Care Form    Patient Name: Dina Lipscomb   :  1945  MRN:  [id number]    Admit date:  2024  Discharge date:  ***    Code Status Order: Prior   Advance Directives:     Admitting Physician:  No admitting provider for patient encounter.  PCP: Yvette Foy DO    Discharging Nurse: ***  Discharging Hospital Unit/Room#:   Discharging Unit Phone Number: ***    Emergency Contact:   Extended Emergency Contact Information  Primary Emergency Contact: Gaudencio Lipscomb  Address: [address] [email or web address] [address] [facility]  Home Phone: [phone]  Relation: Niece/Nephew  Secondary Emergency Contact: josiane lipscomb  Address: [address] [place] [address] [facility]  Home Phone: [phone]  Mobile Phone: [phone]  Relation: Niece/Nephew   needed? No    Past Surgical History:  Past Surgical History:   Procedure Laterality Date    APPENDECTOMY      CARDIOVASCULAR STRESS TEST  2020    Normal    CHOLECYSTECTOMY      COLONOSCOPY  10/07/2016    JOINT REPLACEMENT Right 2012    TKA       Immunization History:   Immunization History   Administered Date(s) Administered    COVID-19, PFIZER GRAY top, DO NOT Dilute, (age 12 y+), IM, 30 mcg/0.3 mL 2022    COVID-19, PFIZER PURPLE top, DILUTE for use, (age 12 y+), 30mcg/0.3mL 2021, 2021, 2021    Influenza Vaccine, unspecified formulation 10/01/2010, 2011, 10/08/2012, 10/08/2013, 10/07/2015, 2017    Influenza Virus Vaccine 10/01/2010, 2011, 10/08/2012, 10/08/2013, 10/07/2015, 2017    Influenza, FLUAD, (age 65 y+), Adjuvanted, 0.5mL 2020, 10/13/2022, 2023    Influenza, FLUCELVAX, (age 6 mo+), MDCK, PF, 0.5mL 10/06/2021    Influenza, High Dose (Fluzone 65 yrs and older) 2019    Influenza, Triv, inactivated, subunit, adjuvanted, IM (Fluad 65 yrs and older) 2018, 2019    Pneumococcal, PCV20, PREVNAR 20, (age

## 2024-01-17 ENCOUNTER — HOSPITAL ENCOUNTER (INPATIENT)
Age: 79
LOS: 2 days | Discharge: OTHER INSTITUTION | DRG: 536 | End: 2024-01-19
Attending: STUDENT IN AN ORGANIZED HEALTH CARE EDUCATION/TRAINING PROGRAM | Admitting: INTERNAL MEDICINE
Payer: MEDICARE

## 2024-01-17 ENCOUNTER — APPOINTMENT (OUTPATIENT)
Dept: CT IMAGING | Age: 79
DRG: 536 | End: 2024-01-17
Payer: MEDICARE

## 2024-01-17 DIAGNOSIS — N17.9 AKI (ACUTE KIDNEY INJURY) (HCC): ICD-10-CM

## 2024-01-17 DIAGNOSIS — R26.2 AMBULATORY DYSFUNCTION: ICD-10-CM

## 2024-01-17 DIAGNOSIS — S32.592A INFERIOR PUBIC RAMUS FRACTURE, LEFT, CLOSED, INITIAL ENCOUNTER (HCC): ICD-10-CM

## 2024-01-17 DIAGNOSIS — S32.9XXA CLOSED NONDISPLACED FRACTURE OF PELVIS, UNSPECIFIED PART OF PELVIS, INITIAL ENCOUNTER (HCC): Primary | ICD-10-CM

## 2024-01-17 PROBLEM — S32.599A INFERIOR PUBIC RAMUS FRACTURE (HCC): Status: ACTIVE | Noted: 2024-01-17

## 2024-01-17 LAB
ALBUMIN SERPL-MCNC: 3.7 G/DL (ref 3.5–5.2)
ALP SERPL-CCNC: 120 U/L (ref 35–104)
ALT SERPL-CCNC: 25 U/L (ref 0–32)
ANION GAP SERPL CALCULATED.3IONS-SCNC: 16 MMOL/L (ref 7–16)
AST SERPL-CCNC: 19 U/L (ref 0–31)
BASOPHILS # BLD: 0.02 K/UL (ref 0–0.2)
BASOPHILS NFR BLD: 0 % (ref 0–2)
BILIRUB SERPL-MCNC: 0.8 MG/DL (ref 0–1.2)
BUN SERPL-MCNC: 31 MG/DL (ref 6–23)
CALCIUM SERPL-MCNC: 9.1 MG/DL (ref 8.6–10.2)
CHLORIDE SERPL-SCNC: 100 MMOL/L (ref 98–107)
CO2 SERPL-SCNC: 21 MMOL/L (ref 22–29)
CREAT SERPL-MCNC: 1.7 MG/DL (ref 0.5–1)
EOSINOPHIL # BLD: 0 K/UL (ref 0.05–0.5)
EOSINOPHILS RELATIVE PERCENT: 0 % (ref 0–6)
ERYTHROCYTE [DISTWIDTH] IN BLOOD BY AUTOMATED COUNT: 14.1 % (ref 11.5–15)
GFR SERPL CREATININE-BSD FRML MDRD: 30 ML/MIN/1.73M2
GLUCOSE SERPL-MCNC: 135 MG/DL (ref 74–99)
HCT VFR BLD AUTO: 34.4 % (ref 34–48)
HGB BLD-MCNC: 11.6 G/DL (ref 11.5–15.5)
IMM GRANULOCYTES # BLD AUTO: 0.06 K/UL (ref 0–0.58)
IMM GRANULOCYTES NFR BLD: 1 % (ref 0–5)
LYMPHOCYTES NFR BLD: 1.26 K/UL (ref 1.5–4)
LYMPHOCYTES RELATIVE PERCENT: 10 % (ref 20–42)
MCH RBC QN AUTO: 33 PG (ref 26–35)
MCHC RBC AUTO-ENTMCNC: 33.7 G/DL (ref 32–34.5)
MCV RBC AUTO: 97.7 FL (ref 80–99.9)
MONOCYTES NFR BLD: 1.04 K/UL (ref 0.1–0.95)
MONOCYTES NFR BLD: 8 % (ref 2–12)
NEUTROPHILS NFR BLD: 81 % (ref 43–80)
NEUTS SEG NFR BLD: 10.18 K/UL (ref 1.8–7.3)
PLATELET # BLD AUTO: 275 K/UL (ref 130–450)
PMV BLD AUTO: 10.5 FL (ref 7–12)
POTASSIUM SERPL-SCNC: 3.6 MMOL/L (ref 3.5–5)
PROT SERPL-MCNC: 6.6 G/DL (ref 6.4–8.3)
RBC # BLD AUTO: 3.52 M/UL (ref 3.5–5.5)
SODIUM SERPL-SCNC: 137 MMOL/L (ref 132–146)
TROPONIN I SERPL HS-MCNC: 24 NG/L (ref 0–9)
TROPONIN I SERPL HS-MCNC: 25 NG/L (ref 0–9)
WBC OTHER # BLD: 12.6 K/UL (ref 4.5–11.5)

## 2024-01-17 PROCEDURE — 85025 COMPLETE CBC W/AUTO DIFF WBC: CPT

## 2024-01-17 PROCEDURE — 93005 ELECTROCARDIOGRAM TRACING: CPT | Performed by: NURSE PRACTITIONER

## 2024-01-17 PROCEDURE — 70450 CT HEAD/BRAIN W/O DYE: CPT

## 2024-01-17 PROCEDURE — 96360 HYDRATION IV INFUSION INIT: CPT

## 2024-01-17 PROCEDURE — 2580000003 HC RX 258

## 2024-01-17 PROCEDURE — 80053 COMPREHEN METABOLIC PANEL: CPT

## 2024-01-17 PROCEDURE — 2580000003 HC RX 258: Performed by: STUDENT IN AN ORGANIZED HEALTH CARE EDUCATION/TRAINING PROGRAM

## 2024-01-17 PROCEDURE — 99285 EMERGENCY DEPT VISIT HI MDM: CPT

## 2024-01-17 PROCEDURE — 1200000000 HC SEMI PRIVATE

## 2024-01-17 PROCEDURE — 72192 CT PELVIS W/O DYE: CPT

## 2024-01-17 PROCEDURE — 6370000000 HC RX 637 (ALT 250 FOR IP)

## 2024-01-17 PROCEDURE — 84484 ASSAY OF TROPONIN QUANT: CPT

## 2024-01-17 RX ORDER — SENNOSIDES A AND B 8.6 MG/1
1 TABLET, FILM COATED ORAL DAILY PRN
Status: DISCONTINUED | OUTPATIENT
Start: 2024-01-17 | End: 2024-01-19 | Stop reason: HOSPADM

## 2024-01-17 RX ORDER — DILTIAZEM HYDROCHLORIDE 180 MG/1
360 CAPSULE, COATED, EXTENDED RELEASE ORAL DAILY
Status: DISCONTINUED | OUTPATIENT
Start: 2024-01-18 | End: 2024-01-19 | Stop reason: HOSPADM

## 2024-01-17 RX ORDER — ONDANSETRON 2 MG/ML
4 INJECTION INTRAMUSCULAR; INTRAVENOUS EVERY 6 HOURS PRN
Status: DISCONTINUED | OUTPATIENT
Start: 2024-01-17 | End: 2024-01-19 | Stop reason: HOSPADM

## 2024-01-17 RX ORDER — BACLOFEN 10 MG/1
10 TABLET ORAL NIGHTLY PRN
Status: DISCONTINUED | OUTPATIENT
Start: 2024-01-17 | End: 2024-01-19 | Stop reason: HOSPADM

## 2024-01-17 RX ORDER — HYDROCODONE BITARTRATE AND ACETAMINOPHEN 5; 325 MG/1; MG/1
1 TABLET ORAL EVERY 4 HOURS PRN
Status: DISCONTINUED | OUTPATIENT
Start: 2024-01-17 | End: 2024-01-18 | Stop reason: ALTCHOICE

## 2024-01-17 RX ORDER — SODIUM CHLORIDE 0.9 % (FLUSH) 0.9 %
10 SYRINGE (ML) INJECTION PRN
Status: DISCONTINUED | OUTPATIENT
Start: 2024-01-17 | End: 2024-01-19 | Stop reason: HOSPADM

## 2024-01-17 RX ORDER — FUROSEMIDE 40 MG/1
40 TABLET ORAL DAILY
Status: DISCONTINUED | OUTPATIENT
Start: 2024-01-17 | End: 2024-01-19 | Stop reason: HOSPADM

## 2024-01-17 RX ORDER — SODIUM CHLORIDE 0.9 % (FLUSH) 0.9 %
SYRINGE (ML) INJECTION
Status: DISPENSED
Start: 2024-01-17 | End: 2024-01-18

## 2024-01-17 RX ORDER — SODIUM CHLORIDE 9 MG/ML
INJECTION, SOLUTION INTRAVENOUS PRN
Status: DISCONTINUED | OUTPATIENT
Start: 2024-01-17 | End: 2024-01-19 | Stop reason: HOSPADM

## 2024-01-17 RX ORDER — ACETAMINOPHEN 650 MG/1
650 SUPPOSITORY RECTAL EVERY 6 HOURS PRN
Status: DISCONTINUED | OUTPATIENT
Start: 2024-01-17 | End: 2024-01-18 | Stop reason: SDUPTHER

## 2024-01-17 RX ORDER — SODIUM CHLORIDE 0.9 % (FLUSH) 0.9 %
10 SYRINGE (ML) INJECTION EVERY 12 HOURS SCHEDULED
Status: DISCONTINUED | OUTPATIENT
Start: 2024-01-17 | End: 2024-01-19 | Stop reason: HOSPADM

## 2024-01-17 RX ORDER — 0.9 % SODIUM CHLORIDE 0.9 %
500 INTRAVENOUS SOLUTION INTRAVENOUS ONCE
Status: COMPLETED | OUTPATIENT
Start: 2024-01-17 | End: 2024-01-17

## 2024-01-17 RX ORDER — ONDANSETRON 4 MG/1
4 TABLET, ORALLY DISINTEGRATING ORAL EVERY 8 HOURS PRN
Status: DISCONTINUED | OUTPATIENT
Start: 2024-01-17 | End: 2024-01-19 | Stop reason: HOSPADM

## 2024-01-17 RX ORDER — ACETAMINOPHEN 325 MG/1
650 TABLET ORAL EVERY 6 HOURS PRN
Status: DISCONTINUED | OUTPATIENT
Start: 2024-01-17 | End: 2024-01-18 | Stop reason: SDUPTHER

## 2024-01-17 RX ORDER — CALCIUM CARBONATE 500 MG/1
2 TABLET, CHEWABLE ORAL 2 TIMES DAILY PRN
Status: DISCONTINUED | OUTPATIENT
Start: 2024-01-17 | End: 2024-01-19 | Stop reason: HOSPADM

## 2024-01-17 RX ADMIN — SODIUM CHLORIDE 500 ML: 9 INJECTION, SOLUTION INTRAVENOUS at 19:44

## 2024-01-17 RX ADMIN — SODIUM CHLORIDE, PRESERVATIVE FREE 10 ML: 5 INJECTION INTRAVENOUS at 22:58

## 2024-01-17 RX ADMIN — BACLOFEN 10 MG: 10 TABLET ORAL at 22:57

## 2024-01-17 RX ADMIN — HYDROCODONE BITARTRATE AND ACETAMINOPHEN 1 TABLET: 5; 325 TABLET ORAL at 22:57

## 2024-01-17 ASSESSMENT — LIFESTYLE VARIABLES
HOW OFTEN DO YOU HAVE A DRINK CONTAINING ALCOHOL: NEVER
HOW MANY STANDARD DRINKS CONTAINING ALCOHOL DO YOU HAVE ON A TYPICAL DAY: PATIENT DOES NOT DRINK

## 2024-01-17 ASSESSMENT — PAIN DESCRIPTION - DESCRIPTORS: DESCRIPTORS: ACHING;DISCOMFORT;SORE;TENDER

## 2024-01-17 ASSESSMENT — PAIN - FUNCTIONAL ASSESSMENT: PAIN_FUNCTIONAL_ASSESSMENT: ACTIVITIES ARE NOT PREVENTED

## 2024-01-17 ASSESSMENT — PAIN DESCRIPTION - FREQUENCY: FREQUENCY: CONTINUOUS

## 2024-01-17 ASSESSMENT — PAIN DESCRIPTION - ONSET: ONSET: ON-GOING

## 2024-01-17 ASSESSMENT — PAIN SCALES - GENERAL
PAINLEVEL_OUTOF10: 8
PAINLEVEL_OUTOF10: 8

## 2024-01-17 ASSESSMENT — PAIN DESCRIPTION - ORIENTATION: ORIENTATION: MID;INNER;LEFT;RIGHT

## 2024-01-17 ASSESSMENT — PAIN DESCRIPTION - PAIN TYPE: TYPE: ACUTE PAIN

## 2024-01-17 ASSESSMENT — PAIN DESCRIPTION - LOCATION: LOCATION: SHOULDER;PELVIS

## 2024-01-17 NOTE — ED NOTES
Department of Emergency Medicine  FIRST PROVIDER TRIAGE NOTE             Independent MLP           1/17/24  3:16 PM EST    Date of Encounter: 1/17/24   MRN: [id number]      HPI: Dina Coulter is a 78 y.o. female who presents to the ED for Dizziness (Was seen yesterday after slipping on ice and breaking right shoulder) and Difficulty Walking     Patient was seen yesterday after a mechanical fall slipping on ice and having a fracture of her right shoulder.  She states now she is having dizziness but did not hit her head during the fall.  She states every time she stands up she gets dizzy and she is having difficulties walking with groin pain.    ROS: Negative for cp or sob.    PE: Gen Appearance/Constitutional: alert  Musculoskeletal: moves all extremities x 4.  Right upper extremity in arm sling     Initial Plan of Care: All treatment areas with department are currently occupied. Plan to order/Initiate the following while awaiting opening in ED: EKG.  Initiate Treatment-Testing, Proceed toTreatment Area When Bed Available for ED Attending/MLP to Continue Care    Electronically signed by KIMBERLY Estrada CNP   DD: 1/17/24       Todd Aburto APRN - CNP  01/17/24 0926

## 2024-01-17 NOTE — ED PROVIDER NOTES
but is not limited to electrolyte abnormality, hip fracture, intracranial hemorrhage, ACS, KELLY,  Ct head did not show ich patient stable               SEP-1 CORE MEASURE DATA      Sepsis Criteria   Severe Sepsis Criteria   Septic Shock Criteria     Must be confirmed or suspected to move forward with diagnosis of sepsis.    Must meet 2:    [] Temperature > 100.9 F (38.3 C)        or < 96.8 F (36 C)  [] HR > 90  [] RR > 20  [] WBC > 12 or < 4 or 10% bands    AND:    [] Infection Confirmed or        Suspected.    OR:    [x] Exclude from SEP-1 because:    [x] No infection present or suspected  [] Does not have 2+ SIRS criteria but may have an incidental infection that requires treatment  [] May have sepsis, but does not meet criteria for severe sepsis or septic shock  [] Alternative explanation for abnormal labs and/or vitals (see MDM)  [] Viral etiology found or highly suspected (including COVID-19) without concomitant bacterial infection   Must meet 1:    [] Lactate > 2       or   [] Signs of Organ Dysfunction:    - SBP < 90 or MAP < 65  - Altered mental status  - Creatinine > 2 or increased from      baseline  - Urine Output < 0.5 ml/kg/hr  - Bilirubin > 2  - INR > 1.5 (not anticoagulated)  - Platelets < 100,000  - Acute Respiratory Failure as     evidenced by new need for NIPPV     or mechanical ventilation        [] No criteria met for Severe Sepsis.   Must meet 1:    [] Lactate > 4        or   [] SBP < 90 or MAP < 65 for at        least two readings in the first        hour after fluid bolus        administration      [] Vasopressors initiated (if hypotension persists after fluid resuscitation)                [] No criteria met for Septic Shock.   Patient Vitals for the past 6 hrs:   BP Temp Pulse Resp SpO2   01/17/24 2130 137/89 98 °F (36.7 °C) -- -- 98 %   01/17/24 2159 139/78 97.9 °F (36.6 °C) (!) 118 18 100 %   01/17/24 2324 133/76 100.2 °F (37.9 °C) (!) 106 18 96 %      Recent Labs     01/17/24  1710   WBC  Absolute 10.18 (H) 1.80 - 7.30 k/uL    Lymphocytes Absolute 1.26 (L) 1.50 - 4.00 k/uL    Monocytes Absolute 1.04 (H) 0.10 - 0.95 k/uL    Eosinophils Absolute 0.00 (L) 0.05 - 0.50 k/uL    Basophils Absolute 0.02 0.00 - 0.20 k/uL    Absolute Immature Granulocyte 0.06 0.00 - 0.58 k/uL   Comprehensive Metabolic Panel w/ Reflex to MG   Result Value Ref Range    Sodium 137 132 - 146 mmol/L    Potassium 3.6 3.5 - 5.0 mmol/L    Chloride 100 98 - 107 mmol/L    CO2 21 (L) 22 - 29 mmol/L    Anion Gap 16 7 - 16 mmol/L    Glucose 135 (H) 74 - 99 mg/dL    BUN 31 (H) 6 - 23 mg/dL    Creatinine 1.7 (H) 0.50 - 1.00 mg/dL    Est, Glom Filt Rate 30 (L) >60 mL/min/1.73m2    Calcium 9.1 8.6 - 10.2 mg/dL    Total Protein 6.6 6.4 - 8.3 g/dL    Albumin 3.7 3.5 - 5.2 g/dL    Total Bilirubin 0.8 0.0 - 1.2 mg/dL    Alkaline Phosphatase 120 (H) 35 - 104 U/L    ALT 25 0 - 32 U/L    AST 19 0 - 31 U/L   Troponin   Result Value Ref Range    Troponin, High Sensitivity 25 (H) 0 - 9 ng/L   Troponin   Result Value Ref Range    Troponin, High Sensitivity 24 (H) 0 - 9 ng/L   EKG 12 Lead   Result Value Ref Range    Ventricular Rate 103 BPM    Atrial Rate 94 BPM    QRS Duration 64 ms    Q-T Interval 376 ms    QTc Calculation (Bazett) 492 ms    R Axis 49 degrees    T Axis -20 degrees       RADIOLOGY:  CT HEAD WO CONTRAST   Final Result   No acute intracranial abnormality.         CT PELVIS WO CONTRAST Additional Contrast? None   Final Result   Acute nondisplaced oblique fracture in the mid portion of the inferior pubic   ramus on the left side.             EKG:  This EKG is signed and interpreted by me.    Rate: 103  Rhythm: Atrial fibrillation  Interpretation: non-specific EKG  Comparison: changes compared to previous EKG      ------------------------- NURSING NOTES AND VITALS REVIEWED ---------------------------  Date / Time Roomed:  1/17/2024  4:45 PM  ED Bed Assignment:  [id number]-A    The nursing notes within the ED encounter and vital signs as below

## 2024-01-18 LAB
ALBUMIN SERPL-MCNC: 3.2 G/DL (ref 3.5–5.2)
ALP SERPL-CCNC: 108 U/L (ref 35–104)
ALT SERPL-CCNC: 18 U/L (ref 0–32)
ANION GAP SERPL CALCULATED.3IONS-SCNC: 12 MMOL/L (ref 7–16)
AST SERPL-CCNC: 22 U/L (ref 0–31)
BASOPHILS # BLD: 0.03 K/UL (ref 0–0.2)
BASOPHILS NFR BLD: 0 % (ref 0–2)
BILIRUB SERPL-MCNC: 0.5 MG/DL (ref 0–1.2)
BUN SERPL-MCNC: 30 MG/DL (ref 6–23)
CALCIUM SERPL-MCNC: 8.7 MG/DL (ref 8.6–10.2)
CHLORIDE SERPL-SCNC: 101 MMOL/L (ref 98–107)
CO2 SERPL-SCNC: 22 MMOL/L (ref 22–29)
CREAT SERPL-MCNC: 1.6 MG/DL (ref 0.5–1)
EKG ATRIAL RATE: 94 BPM
EKG Q-T INTERVAL: 376 MS
EKG QRS DURATION: 64 MS
EKG QTC CALCULATION (BAZETT): 492 MS
EKG R AXIS: 49 DEGREES
EKG T AXIS: -20 DEGREES
EKG VENTRICULAR RATE: 103 BPM
EOSINOPHIL # BLD: 0.01 K/UL (ref 0.05–0.5)
EOSINOPHILS RELATIVE PERCENT: 0 % (ref 0–6)
ERYTHROCYTE [DISTWIDTH] IN BLOOD BY AUTOMATED COUNT: 14.2 % (ref 11.5–15)
GFR SERPL CREATININE-BSD FRML MDRD: 32 ML/MIN/1.73M2
GLUCOSE SERPL-MCNC: 115 MG/DL (ref 74–99)
HCT VFR BLD AUTO: 33.5 % (ref 34–48)
HGB BLD-MCNC: 10.6 G/DL (ref 11.5–15.5)
IMM GRANULOCYTES # BLD AUTO: 0.05 K/UL (ref 0–0.58)
IMM GRANULOCYTES NFR BLD: 0 % (ref 0–5)
LYMPHOCYTES NFR BLD: 1.35 K/UL (ref 1.5–4)
LYMPHOCYTES RELATIVE PERCENT: 11 % (ref 20–42)
MCH RBC QN AUTO: 32 PG (ref 26–35)
MCHC RBC AUTO-ENTMCNC: 31.6 G/DL (ref 32–34.5)
MCV RBC AUTO: 101.2 FL (ref 80–99.9)
MONOCYTES NFR BLD: 1.32 K/UL (ref 0.1–0.95)
MONOCYTES NFR BLD: 11 % (ref 2–12)
NEUTROPHILS NFR BLD: 77 % (ref 43–80)
NEUTS SEG NFR BLD: 9.4 K/UL (ref 1.8–7.3)
PLATELET # BLD AUTO: 239 K/UL (ref 130–450)
PMV BLD AUTO: 11.5 FL (ref 7–12)
POTASSIUM SERPL-SCNC: 4.8 MMOL/L (ref 3.5–5)
PROT SERPL-MCNC: 6.1 G/DL (ref 6.4–8.3)
RBC # BLD AUTO: 3.31 M/UL (ref 3.5–5.5)
SODIUM SERPL-SCNC: 135 MMOL/L (ref 132–146)
WBC OTHER # BLD: 12.2 K/UL (ref 4.5–11.5)

## 2024-01-18 PROCEDURE — 2580000003 HC RX 258

## 2024-01-18 PROCEDURE — 93010 ELECTROCARDIOGRAM REPORT: CPT | Performed by: INTERNAL MEDICINE

## 2024-01-18 PROCEDURE — 97165 OT EVAL LOW COMPLEX 30 MIN: CPT

## 2024-01-18 PROCEDURE — 6370000000 HC RX 637 (ALT 250 FOR IP)

## 2024-01-18 PROCEDURE — 97530 THERAPEUTIC ACTIVITIES: CPT

## 2024-01-18 PROCEDURE — 80053 COMPREHEN METABOLIC PANEL: CPT

## 2024-01-18 PROCEDURE — 6360000002 HC RX W HCPCS: Performed by: ORTHOPAEDIC SURGERY

## 2024-01-18 PROCEDURE — 97161 PT EVAL LOW COMPLEX 20 MIN: CPT

## 2024-01-18 PROCEDURE — 1200000000 HC SEMI PRIVATE

## 2024-01-18 PROCEDURE — 85025 COMPLETE CBC W/AUTO DIFF WBC: CPT

## 2024-01-18 PROCEDURE — 6370000000 HC RX 637 (ALT 250 FOR IP): Performed by: ORTHOPAEDIC SURGERY

## 2024-01-18 RX ORDER — ACETAMINOPHEN 325 MG/1
650 TABLET ORAL EVERY 6 HOURS PRN
Status: DISCONTINUED | OUTPATIENT
Start: 2024-01-18 | End: 2024-01-19 | Stop reason: HOSPADM

## 2024-01-18 RX ORDER — ACETAMINOPHEN 650 MG/1
650 SUPPOSITORY RECTAL EVERY 6 HOURS PRN
Status: DISCONTINUED | OUTPATIENT
Start: 2024-01-18 | End: 2024-01-19 | Stop reason: HOSPADM

## 2024-01-18 RX ORDER — HYDROCODONE BITARTRATE AND ACETAMINOPHEN 7.5; 325 MG/1; MG/1
1 TABLET ORAL EVERY 4 HOURS PRN
Status: DISCONTINUED | OUTPATIENT
Start: 2024-01-18 | End: 2024-01-19 | Stop reason: HOSPADM

## 2024-01-18 RX ADMIN — APIXABAN 5 MG: 5 TABLET, FILM COATED ORAL at 09:50

## 2024-01-18 RX ADMIN — HYDROMORPHONE HYDROCHLORIDE 0.5 MG: 1 INJECTION, SOLUTION INTRAMUSCULAR; INTRAVENOUS; SUBCUTANEOUS at 08:53

## 2024-01-18 RX ADMIN — SODIUM CHLORIDE, PRESERVATIVE FREE 10 ML: 5 INJECTION INTRAVENOUS at 20:36

## 2024-01-18 RX ADMIN — DILTIAZEM HYDROCHLORIDE 360 MG: 180 CAPSULE, COATED, EXTENDED RELEASE ORAL at 09:50

## 2024-01-18 RX ADMIN — APIXABAN 5 MG: 5 TABLET, FILM COATED ORAL at 20:35

## 2024-01-18 RX ADMIN — HYDROCODONE BITARTRATE AND ACETAMINOPHEN 1 TABLET: 7.5; 325 TABLET ORAL at 20:35

## 2024-01-18 RX ADMIN — SODIUM CHLORIDE, PRESERVATIVE FREE 10 ML: 5 INJECTION INTRAVENOUS at 09:50

## 2024-01-18 RX ADMIN — HYDROCODONE BITARTRATE AND ACETAMINOPHEN 1 TABLET: 7.5; 325 TABLET ORAL at 14:20

## 2024-01-18 RX ADMIN — FUROSEMIDE 40 MG: 40 TABLET ORAL at 09:54

## 2024-01-18 ASSESSMENT — PAIN SCALES - GENERAL
PAINLEVEL_OUTOF10: 7
PAINLEVEL_OUTOF10: 9
PAINLEVEL_OUTOF10: 8
PAINLEVEL_OUTOF10: 2
PAINLEVEL_OUTOF10: 2

## 2024-01-18 ASSESSMENT — PAIN DESCRIPTION - LOCATION
LOCATION: SHOULDER
LOCATION: SHOULDER
LOCATION: PELVIS;SHOULDER

## 2024-01-18 ASSESSMENT — PAIN DESCRIPTION - ORIENTATION
ORIENTATION: RIGHT;LEFT
ORIENTATION: RIGHT
ORIENTATION: RIGHT

## 2024-01-18 ASSESSMENT — ENCOUNTER SYMPTOMS
COUGH: 0
VOMITING: 0
NAUSEA: 0
DIARRHEA: 0
ABDOMINAL DISTENTION: 0
PHOTOPHOBIA: 0
ABDOMINAL PAIN: 0
SHORTNESS OF BREATH: 0
CHEST TIGHTNESS: 0

## 2024-01-18 ASSESSMENT — PAIN DESCRIPTION - DESCRIPTORS
DESCRIPTORS: ACHING;DISCOMFORT;DULL
DESCRIPTORS: ACHING;SHARP
DESCRIPTORS: ACHING;DISCOMFORT

## 2024-01-18 ASSESSMENT — PAIN DESCRIPTION - ONSET: ONSET: ON-GOING

## 2024-01-18 ASSESSMENT — PAIN DESCRIPTION - PAIN TYPE: TYPE: ACUTE PAIN

## 2024-01-18 ASSESSMENT — PAIN SCALES - WONG BAKER
WONGBAKER_NUMERICALRESPONSE: 0
WONGBAKER_NUMERICALRESPONSE: 2

## 2024-01-18 ASSESSMENT — PAIN - FUNCTIONAL ASSESSMENT: PAIN_FUNCTIONAL_ASSESSMENT: PREVENTS OR INTERFERES SOME ACTIVE ACTIVITIES AND ADLS

## 2024-01-18 ASSESSMENT — PAIN DESCRIPTION - FREQUENCY: FREQUENCY: CONTINUOUS

## 2024-01-18 NOTE — PROGRESS NOTES
Occupational Therapy    OCCUPATIONAL THERAPY INITIAL EVALUATION    [facility]   [address], [place]         Date:2024                                                  Patient Name: Dina Coulter    MRN: [id number]    : 1945    Room: [address] [place] [address]      Evaluating OT: Jaylin Dash OTR/L   [id number]      Referring Provider:Tania Cuevas APRN - CNP     Specific Provider Orders/Date:OT eval and treat 2024      Diagnosis:  Inferior pubic ramus fracture, left, closed, initial encounter ([facility]) [S32.592A]  Ambulatory dysfunction [R26.2]  Closed nondisplaced fracture of pelvis, unspecified part of pelvis, initial encounter ([facility]) [S32.9XXA]  Per ortho note:   Right comminuted prox humerus fx. And left inferior pubic ramus fx.   Closed treatment of right prox humerus fx in sling  Closed treatment of left ramus fx with assistive device  WBAT both legs  NWB right arm  Let elbow hang free  Sling when  out of bed  No plan for surgery at this time   Precautions:  Fall Risk, NWB R UE sling, WBAT B LEs,      Assessment of current deficits    [x] Functional mobility  [x]ADLs  [x] Strength               []Cognition    [x] Functional transfers   [x] IADLs         [x] Safety Awareness   [x]Endurance    [] Fine Coordination              [x] Balance      [] Vision/perception   []Sensation     []Gross Motor Coordination  [] ROM  [] Delirium                   [] Motor Control     OT PLAN OF CARE   OT POC based on physician orders, patient diagnosis and results of clinical assessment    Frequency/Duration  2-4 days/wk for 2 weeks PRN   Specific OT Treatment Interventions to include:   ADL retraining/adapted techniques and AE recommendations to increase functional independence within precautions                    Energy conservation techniques to improve tolerance for selfcare routine   Functional transfer/mobility training/DME recommendations for increased  independence, safety and fall prevention         Patient/family education to increase safety and functional independence             Environmental modifications for safe mobility and completion of ADLs                             Therapeutic activity to improve functional performance during ADLs.                                         Therapeutic exercise to improve tolerance and functional strength for ADLs    Balance retraining/tolerance tasks for facilitation of postural control with dynamic challenges during ADLs .      Positioning to improve functional independence      Recommended Adaptive Equipment: TBD     Home Living: Pt lives alone,  1 story with 3 steps/ rail   Bathroom setup: walk in shower    Equipment owned: cane , walker     Prior Level of Function: Independent  with ADLs , and  with IADLs; ambulated with no device       Pain Level: R UE ;   Cognition: A&O: 4/4;    Memory:  good    Sequencing:  good    Problem solving:  good    Judgement/safety:  good      Functional Assessment:  AM-PAC Daily Activity Raw Score: 12/24   Initial Eval Status  Date: 1/18/2024 Treatment Status  Date: STGs = LTGs  Time frame: 10-14 days   Feeding SBA/set-up   Independent    Grooming Mod A , seated   SBA/set-up    UB Dressing Max A  Min A    LB Dressing Max A/dependen t  Min A    Bathing Max A   Min A    Toileting Max A   Min A    Bed Mobility  Mod A  Supine to sit   (HOB elevated )  Moving slowly   Max Ax2  Sit-supine  Min A    Functional Transfers Mod A  Sit-stand from bed   CGA   Functional Mobility Mod A, HHA   Approx  3 side steps to HOB   CGA  with good tolerance    Balance Sitting:     Static:  supervision - EOB     Dynamic:Mod A  Standing: Mod A   Independent   CGA- standing    Activity Tolerance No SOB  Good  with ADL activity    Visual/  Perceptual Glasses: none by bedside                  Hand Dominance right   AROM (PROM)   RUE  Sling  Pain   LUE WFL     Hearing: WFL   Sensation:  No c/o numbness or tingling

## 2024-01-18 NOTE — CARE COORDINATION
Met with patient and niece and nephew, Dustin about diagnosis and discharge plan of care. Pt admit for left pubic ramus fx after mechanical fall at home. Pt fell while shoveling driveway. Lives alone in [facility]. Pt has standard walker with no wheels, toilet riser and walk in shower with seat. PCP is Dr oFy. PT score 11/24. Pt receptive to ALFRED. Shakira Benitesiar-referral called and accepted. Will initiate pre-cert. 7000 and ambulance forms done. GUSTAVO initiated. Will follow-mjo

## 2024-01-18 NOTE — H&P
Internal Medicine History & Physical     Name: Dina Coulter  : 1945  Chief Complaint: Dizziness (Was seen yesterday after slipping on ice and breaking right shoulder) and Difficulty Walking  Primary Care Physician: Yvette Foy DO  Admission date: 2024  Date of service: 2024   Unit: [address] MED SURG     History of Present Illness  Dina is a 78 y.o. year old female. She presented to the hospital after having some hip pain.  She states that she slipped on the ice at her house on Tuesday when she was shoveling snow.  She had pain in her shoulder.  She came to the emergency department.  They found that she had a fracture.  She was put in a sling.  She was discharged home.  She could not walk shortly after that had hip pain.  She came back to the emergency department.  She was found to have a pubic ramus fracture.  She has pain when she moves her leg.  She denies any other complaints or issues at this time.  Overall symptoms are moderate in severity.    There were no family or friends present at bedside.  History is provided by the patient.  She is felt to be a good historian.    ED course:   Initial blood work and imaging studies performed. Admission recommended by ED physician. My coverage discussed the case with the ED provider. Meds in the ED consisted of the following: IV fluid    Past Medical History:   Diagnosis Date    Acute renal failure syndrome (HCC) 10/1/16  10/07/2016    2ry severe dehydration     Chest pain 01/15/2020    R/O ACS- normal stress    Chronic pain syndrome     Colon polyps     Microscopic colitis    Degenerative joint disease     Involving multiple joints    Diarrhea     2ry C-Diff 10/1/2016    GERD (gastroesophageal reflux disease)     History of DVT (deep vein thrombosis)     History of pulmonary embolism     following knee surgery    Hyperlipidemia     Hypertension     Insomnia     denies as of 1/15/20    Obesity     Paroxysmal atrial fibrillation (HCC)   organomegaly  : Deferred   Extremities: no lower extremity edema, right shoulder in a sling, pain with movement of the left leg, no cyanosis, no clubbing, 2+ pedal pulses palpated  Skin: normal color, normal texture, normal turgor, no rashes, no lesions  Neurologic:5/5 muscle strength throughout, normal muscle tone throughout, face symmetric, hearing intact, tongue midline, speech appropriate without slurring, sensation to fine touch intact in upper and lower extremities    Labs-   Lab Results   Component Value Date    WBC 12.6 (H) 01/17/2024    HGB 11.6 01/17/2024    HCT 34.4 01/17/2024     01/17/2024     01/17/2024    K 3.6 01/17/2024     01/17/2024    CREATININE 1.7 (H) 01/17/2024    BUN 31 (H) 01/17/2024    CO2 21 (L) 01/17/2024    GLUCOSE 135 (H) 01/17/2024    ALT 25 01/17/2024    AST 19 01/17/2024    INR 1.1 10/06/2016    APTT 48.0 (H) 10/06/2016       Lab Results   Component Value Date    TROPHS 24 (H) 01/17/2024    TROPHS 25 (H) 01/17/2024    TROPHS 27 (H) 08/16/2021        Lab Results   Component Value Date    TSH 1.85 09/18/2023    VITD25 32.1 09/18/2023     (H) 10/02/2016       Lab Results   Component Value Date    MG 1.9 10/06/2016    PHOS 3.2 10/07/2016       Recent Radiological Studies:  CT HEAD WO CONTRAST   Final Result   No acute intracranial abnormality.         CT PELVIS WO CONTRAST Additional Contrast? None   Final Result   Acute nondisplaced oblique fracture in the mid portion of the inferior pubic   ramus on the left side.             Assessment  Active Hospital Problems    Diagnosis     Inferior pubic ramus fracture, left, closed, initial encounter ([facility]) [S32.442A]      Priority: High    Stage 3a chronic kidney disease ([facility]) [N18.31]     Chronic heart failure with preserved ejection fraction ([facility]) [I50.32]     History of pulmonary embolism [Z86.711]     GERD (gastroesophageal reflux disease) [K21.9]     Hypertension [I10]     Chronic pain syndrome [G89.4]

## 2024-01-18 NOTE — PROGRESS NOTES
P Quality Flow/Interdisciplinary Rounds Progress Note        Quality Flow Rounds held on January 18, 2024    Disciplines Attending:  Bedside Nurse, , , and Nursing Unit Leadership    Dina Coulter was admitted on 1/17/2024  4:45 PM    Anticipated Discharge Date:       Disposition:    Alphonse Score:  Alphonse Scale Score: 15    Readmission Risk              Risk of Unplanned Readmission:  12           Discussed patient goal for the day, patient clinical progression, and barriers to discharge.  The following Goal(s) of the Day/Commitment(s) have been identified:   ortho consult - nonsurgical, PT/OT, discharge planning      Cole Ward RN  January 18, 2024

## 2024-01-18 NOTE — CONSULTS
Ortho consult    Requested by Dr. Grayson  Pain score 7/10  CC right shoulder and left hip pain  Reason for consult - right prox hum fx and left pubic ramus fx      Dina Coulter was an 78 year old female who presented to ED with concern for weakness and difficulty ambulating. Patient reported she had a mechanical fall yesterday and was found to have a humeral head fx. Patient was discharged home, this morning patient realized she was unable to ambulate. Patient return to ED, patient also reported she was feeling lightheaded this morning.  Patient states that she has return of symptoms when she tries to stand or she turns her head.  Patient denies numbness, tingling, weakness, fever, chills    Her pain is sharp.  Pain is the same.  Pain is worse with activities and better at rest.     The history is provided by the patient and medical records.         Review of Systems   Constitutional:  Negative for chills, diaphoresis, fatigue and fever.   Eyes:  Negative for photophobia and visual disturbance.   Respiratory:  Negative for cough, chest tightness and shortness of breath.    Cardiovascular:  Negative for chest pain, palpitations and leg swelling.   Gastrointestinal:  Negative for abdominal distention, abdominal pain, diarrhea, nausea and vomiting.   Genitourinary:  Negative for dysuria.   Musculoskeletal:  Negative for neck pain and neck stiffness.   Skin:  Negative for pallor and rash.   Neurological:  Positive for dizziness and light-headedness. Negative for headaches.   Psychiatric/Behavioral:  Negative for confusion.          Physical Exam  Vitals and nursing note reviewed.   Constitutional:       General: She is not in acute distress.     Appearance: Normal appearance. She is not ill-appearing.   HENT:      Head: Normocephalic and atraumatic.   Eyes:      General: No scleral icterus.     Conjunctiva/sclera: Conjunctivae normal.      Pupils: Pupils are equal, round, and reactive to light.   Cardiovascular:

## 2024-01-18 NOTE — PROGRESS NOTES
Physical Therapy  Facility/Department: [address] INTERMEDIATE MED SURG  Physical Therapy Initial Assessment    Name: Dina Coulter  : 1945  MRN: [id number]  Date of Service: 2024    Attending Provider:  Geovani Grayson DO    Evaluating PT:  Duane Thomas Jr., P.T.    Room #:  0726/0726-A  Diagnosis:  Inferior pubic ramus fracture, left, closed, initial encounter ([facility]) [S32.592A]  Ambulatory dysfunction [R26.2]  Closed nondisplaced fracture of pelvis, unspecified part of pelvis, initial encounter ([facility]) [S32.9XXA], On 24 pt slipped on the ice outside and fell which caused a fractured her R shoulder.  She was sent home from ER and then next day 24 was walking at home and felt dizzy and thinks she passed out and fell to the floor.  She was taken to ER and found to have a pelvic fracture.    Imaging: x-rays of R shoulder show Displaced comminuted fracture of the right humeral head and neck. CT scan of pelvis show Acute nondisplaced oblique fracture in the mid portion of the inferior pubic ramus on the left side.  Precautions:  WBAT BLE, NWB RUE with sling when OOB, falls, bed/chair alarm.  Equipment Needs:  TBD    SUBJECTIVE:    Pt lives alone in a 1 floor condo with 3 stairs and 1 rail to enter through the garage or can go in through the front door with 1 step to enter.  Pt ambulated with no AD PTA, but has a cane and ww if needed.     OBJECTIVE:   Initial Evaluation  Date: 24 Treatment Short Term/ Long Term   Goals   Was pt agreeable to Eval/treatment? yes     Does pt have pain? Was just given IV Dilaudid and has minimal pain L hip at rest, but pain increased with movement and with WB, R shoulder pain was 6-7/10     Bed Mobility  Rolling: MAX A  Supine to sit: MAX A x2  Sit to supine: MAX A x2  Scooting: MAX A x2  Independent    Transfers Sit to stand: MOD A x2  Stand to sit: MOD A x2  Stand pivot: MOD A with Chilo cane  SBA   Ambulation   3 feet x 2 reps with hemicane on L side MOD A  50  medical information, gathering information on past medical history/social history and prior level of function, completion of standardized testing/informal observation of tasks, assessment of data and education on plan of care and goals.    CPT codes:  [x] Low Complexity PT evaluation 75665  [] Moderate Complexity PT evaluation 75663  [] High Complexity PT evaluation 56001  [] PT Re-evaluation 71160  [] Gait training 14401 ** minutes  [] Manual therapy 71637 ** minutes  [x] Therapeutic activities 54449 15 minutes  [] Therapeutic exercises 64376 ** minutes  [] Neuromuscular reeducation 04670 ** minutes     Duane Thomas Jr., P.T.  License Number: [id number]

## 2024-01-18 NOTE — PLAN OF CARE
Problem: Discharge Planning  Goal: Discharge to home or other facility with appropriate resources  Outcome: Progressing     Problem: Pain  Goal: Verbalizes/displays adequate comfort level or baseline comfort level  Outcome: Progressing     Problem: Skin/Tissue Integrity  Goal: Absence of new skin breakdown  Outcome: Progressing     Problem: Safety - Adult  Goal: Free from fall injury  Outcome: Progressing     Problem: Chronic Conditions and Co-morbidities  Goal: Patient's chronic conditions and co-morbidity symptoms are monitored and maintained or improved  Outcome: Progressing

## 2024-01-18 NOTE — CARE COORDINATION
Internal Medicine On-call Care Coordination Note    I was called by the ED physician because they recommended admission for this patient and we cover their PCP.  The history as I understand it after discussion with the ED physician is as follows:    Presents s/p fall yesterday  Imaging showing acute nondisplaced oblique fx inferior pubic ramus  Unable to ambulate    I placed admission orders.  Including:    PT/OT & Social work consult  General admission orders  Hold Lasix (baseline creat 1.3, currently at 1.7)    Dr. Grayson or his coverage will see the patient tomorrow for H&P.    Electronically signed by KIMBERLY Hoskins CNP on 1/17/2024 at 8:41 PM

## 2024-01-18 NOTE — PROGRESS NOTES
4 Eyes Skin Assessment     NAME:  Dina Coulter  YOB: 1945  MEDICAL RECORD NUMBER:  [id number]    The patient is being assessed for  Admission    I agree that at least one RN has performed a thorough Head to Toe Skin Assessment on the patient. ALL assessment sites listed below have been assessed.      Areas assessed by both nurses:    Head, Face, Ears, Shoulders, Back, Chest, Arms, Elbows, Hands, Sacrum. Buttock, Coccyx, Ischium, Legs. Feet and Heels, and Under Medical Devices         Does the Patient have a Wound? No noted wound(s)       Alphonse Prevention initiated by RN: No  Wound Care Orders initiated by RN: No    Pressure Injury (Stage 3,4, Unstageable, DTI, NWPT, and Complex wounds) if present, place Wound referral order by RN under : No    New Ostomies, if present place, Ostomy referral order under : No     Nurse 1 eSignature: Electronically signed by Haley Abad RN on 1/18/24 at 6:01 AM EST    **SHARE this note so that the co-signing nurse can place an eSignature**    Nurse 2 eSignature: Electronically signed by Devin Franco RN on 1/18/24 at 4:27 PM EST

## 2024-01-18 NOTE — DISCHARGE INSTR - COC
Continuity of Care Form    Patient Name: Dina Lipscomb   :  1945  MRN:  [id number]    Admit date:  2024  Discharge date:  24    Code Status Order: Full Code   Advance Directives:     Admitting Physician:  Geovani Grayson DO  PCP: Yvette Foy DO    Discharging Nurse: Rachel  Discharging Hospital Unit/Room#: 0726/0726-A  Discharging Unit Phone Number: [phone]    Emergency Contact:   Extended Emergency Contact Information  Primary Emergency Contact: Gaudencio Lipscomb  Address: [address] [place] [address] [facility]  Home Phone: [phone]  Relation: Niece/Nephew  Secondary Emergency Contact: josiane lipscomb  Address: [address] [place] [address] [facility]  Home Phone: [phone]  Mobile Phone: [phone]  Relation: Niece/Nephew   needed? No    Past Surgical History:  Past Surgical History:   Procedure Laterality Date    APPENDECTOMY      CARDIOVASCULAR STRESS TEST  2020    Normal    CHOLECYSTECTOMY      COLONOSCOPY  10/07/2016    JOINT REPLACEMENT Right 2012    TKA       Immunization History:   Immunization History   Administered Date(s) Administered    COVID-19, PFIZER GRAY top, DO NOT Dilute, (age 12 y+), IM, 30 mcg/0.3 mL 2022    COVID-19, PFIZER PURPLE top, DILUTE for use, (age 12 y+), 30mcg/0.3mL 2021, 2021, 2021    Influenza Vaccine, unspecified formulation 10/01/2010, 2011, 10/08/2012, 10/08/2013, 10/07/2015, 2017    Influenza Virus Vaccine 10/01/2010, 2011, 10/08/2012, 10/08/2013, 10/07/2015, 2017    Influenza, FLUAD, (age 65 y+), Adjuvanted, 0.5mL 2020, 10/13/2022, 2023    Influenza, FLUCELVAX, (age 6 mo+), MDCK, PF, 0.5mL 10/06/2021    Influenza, High Dose (Fluzone 65 yrs and older) 2019    Influenza, Triv, inactivated, subunit, adjuvanted, IM (Fluad 65 yrs and older) 2018, 2019    Pneumococcal, PCV20, PREVNAR 20, (age    Elimination:  Continence:   Bowel: Yes  Bladder: Yes  Urinary Catheter: None   Colostomy/Ileostomy/Ileal Conduit: No       Date of Last BM: 01/18/24    Intake/Output Summary (Last 24 hours) at 1/18/2024 1447  Last data filed at 1/18/2024 0927  Gross per 24 hour   Intake --   Output 100 ml   Net -100 ml     No intake/output data recorded.    Safety Concerns:     History of Falls (last 30 days) and At Risk for Falls    Impairments/Disabilities:      None    Nutrition Therapy:  Current Nutrition Therapy:   - Oral Diet:  General    Routes of Feeding: None  Liquids: No Restrictions  Daily Fluid Restriction: no  Last Modified Barium Swallow with Video (Video Swallowing Test): not done    Treatments at the Time of Hospital Discharge:   Respiratory Treatments: ***  Oxygen Therapy:  is not on home oxygen therapy.  Ventilator:    - No ventilator support    Rehab Therapies: Physical Therapy and Occupational Therapy  Weight Bearing Status/Restrictions: Non Weight Bearing RUE  Other Medical Equipment (for information only, NOT a DME order):  walker  Other Treatments: ***    Patient's personal belongings (please select all that are sent with patient):  Glasses    RN SIGNATURE:  Electronically signed by Siobhan Arredondo RN on 1/19/24 at 1:36 PM EST    CASE MANAGEMENT/SOCIAL WORK SECTION    Inpatient Status Date: ***    Readmission Risk Assessment Score:  Readmission Risk              Risk of Unplanned Readmission:  12           Discharging to Facility/ Agency   Name: [facility]  Address   [address]             Contact Information    [phone]        Dialysis Facility (if applicable)   Name:  Address:  Dialysis Schedule:  Phone:  Fax:    / signature: Electronically signed by Saira Hammonds RN on 1/18/24 at 2:47 PM EST    PHYSICIAN SECTION    Prognosis: Fair    Condition at Discharge: Stable    Rehab Potential (if transferring to Rehab): Fair    Recommended Labs or Other

## 2024-01-18 NOTE — ACP (ADVANCE CARE PLANNING)
Advance Care Planning   Healthcare Decision Maker:    Primary Decision Maker: Gaudencio Coulter - Niece/Nephew - [phone]    Click here to complete Healthcare Decision Makers including selection of the Healthcare Decision Maker Relationship (ie \"Primary\").

## 2024-01-18 NOTE — PLAN OF CARE
Patient's chart updated to reflect:      .    - HF care plan, HF education points and HF discharge instructions.  -Orders: daily weights, I/O.  -PCP and/or Cardiologist appointment to be scheduled within 7 days of hospital discharge.  -History of HF, not primary admission Dx.  Patient admitted for treatment of pubic ramus fracture.     Laina Higuera RN BSN  Heart Failure Navigator

## 2024-01-18 NOTE — ED NOTES
ED to Inpatient Handoff Report    Notified Haley that electronic handoff available and patient ready for transport to room 726.    Safety Risks: Risk of falls    Patient in Restraints: no    Constant Observer or Patient : no    Telemetry Monitoring Ordered :No           Order to transfer to unit without monitor:N/A    Last MEWS:   Time completed: 2130    Deterioration Index Score:   Predictive Model Details          23 (Normal)  Factor Value    Calculated 1/17/2024 21:31 58% Age 78 years old    Deterioration Index Model 12% WBC count abnormal (12.6 k/uL)     11% Respiratory rate 18     6% Systolic 137     5% Pulse 94     4% BUN abnormal (31 mg/dL)     2% Potassium 3.6 mmol/L     2% Sodium 137 mmol/L     1% Pulse oximetry 98 %     0% Hematocrit 34.4 %     0% Temperature 98 °F (36.7 °C)        Vitals:    01/17/24 1516 01/17/24 1517 01/17/24 2130   BP:  108/63 137/89   Pulse:  94    Resp:  18    Temp: 97.1 °F (36.2 °C) 98.2 °F (36.8 °C) 98 °F (36.7 °C)   TempSrc: Oral  Oral   SpO2:  97% 98%   Weight: 74.8 kg (165 lb)     Height: 1.676 m (5' 6\")           Opportunity for questions and clarification was provided.

## 2024-01-19 VITALS
TEMPERATURE: 98.2 F | RESPIRATION RATE: 18 BRPM | DIASTOLIC BLOOD PRESSURE: 72 MMHG | HEIGHT: 66 IN | SYSTOLIC BLOOD PRESSURE: 136 MMHG | WEIGHT: 165 LBS | HEART RATE: 104 BPM | BODY MASS INDEX: 26.52 KG/M2 | OXYGEN SATURATION: 97 %

## 2024-01-19 LAB
ALBUMIN SERPL-MCNC: 3.1 G/DL (ref 3.5–5.2)
ALP SERPL-CCNC: 100 U/L (ref 35–104)
ALT SERPL-CCNC: 15 U/L (ref 0–32)
ANION GAP SERPL CALCULATED.3IONS-SCNC: 10 MMOL/L (ref 7–16)
AST SERPL-CCNC: 15 U/L (ref 0–31)
BASOPHILS # BLD: 0.09 K/UL (ref 0–0.2)
BASOPHILS NFR BLD: 1 % (ref 0–2)
BILIRUB SERPL-MCNC: 0.5 MG/DL (ref 0–1.2)
BUN SERPL-MCNC: 29 MG/DL (ref 6–23)
CALCIUM SERPL-MCNC: 8.5 MG/DL (ref 8.6–10.2)
CHLORIDE SERPL-SCNC: 102 MMOL/L (ref 98–107)
CO2 SERPL-SCNC: 24 MMOL/L (ref 22–29)
CREAT SERPL-MCNC: 1.5 MG/DL (ref 0.5–1)
EOSINOPHIL # BLD: 0.18 K/UL (ref 0.05–0.5)
EOSINOPHILS RELATIVE PERCENT: 2 % (ref 0–6)
ERYTHROCYTE [DISTWIDTH] IN BLOOD BY AUTOMATED COUNT: 14.1 % (ref 11.5–15)
GFR SERPL CREATININE-BSD FRML MDRD: 34 ML/MIN/1.73M2
GLUCOSE SERPL-MCNC: 109 MG/DL (ref 74–99)
HCT VFR BLD AUTO: 30.4 % (ref 34–48)
HGB BLD-MCNC: 9.9 G/DL (ref 11.5–15.5)
LYMPHOCYTES NFR BLD: 1.46 K/UL (ref 1.5–4)
LYMPHOCYTES RELATIVE PERCENT: 14 % (ref 20–42)
MCH RBC QN AUTO: 32.6 PG (ref 26–35)
MCHC RBC AUTO-ENTMCNC: 32.6 G/DL (ref 32–34.5)
MCV RBC AUTO: 100 FL (ref 80–99.9)
MONOCYTES NFR BLD: 0.91 K/UL (ref 0.1–0.95)
MONOCYTES NFR BLD: 9 % (ref 2–12)
NEUTROPHILS NFR BLD: 75 % (ref 43–80)
NEUTS SEG NFR BLD: 7.85 K/UL (ref 1.8–7.3)
PLATELET # BLD AUTO: 209 K/UL (ref 130–450)
PMV BLD AUTO: 11.1 FL (ref 7–12)
POTASSIUM SERPL-SCNC: 4.1 MMOL/L (ref 3.5–5)
PROT SERPL-MCNC: 5.9 G/DL (ref 6.4–8.3)
RBC # BLD AUTO: 3.04 M/UL (ref 3.5–5.5)
RBC # BLD: ABNORMAL 10*6/UL
RBC # BLD: ABNORMAL 10*6/UL
SODIUM SERPL-SCNC: 136 MMOL/L (ref 132–146)
WBC OTHER # BLD: 10.5 K/UL (ref 4.5–11.5)

## 2024-01-19 PROCEDURE — 6360000002 HC RX W HCPCS: Performed by: ORTHOPAEDIC SURGERY

## 2024-01-19 PROCEDURE — 2580000003 HC RX 258

## 2024-01-19 PROCEDURE — 97530 THERAPEUTIC ACTIVITIES: CPT

## 2024-01-19 PROCEDURE — 6370000000 HC RX 637 (ALT 250 FOR IP)

## 2024-01-19 PROCEDURE — 97535 SELF CARE MNGMENT TRAINING: CPT

## 2024-01-19 PROCEDURE — 85025 COMPLETE CBC W/AUTO DIFF WBC: CPT

## 2024-01-19 PROCEDURE — 80053 COMPREHEN METABOLIC PANEL: CPT

## 2024-01-19 PROCEDURE — 6370000000 HC RX 637 (ALT 250 FOR IP): Performed by: ORTHOPAEDIC SURGERY

## 2024-01-19 RX ORDER — HYDROCODONE BITARTRATE AND ACETAMINOPHEN 7.5; 325 MG/1; MG/1
1 TABLET ORAL EVERY 6 HOURS PRN
Qty: 12 TABLET | Refills: 0 | Status: SHIPPED | OUTPATIENT
Start: 2024-01-19 | End: 2024-01-22

## 2024-01-19 RX ORDER — SENNOSIDES A AND B 8.6 MG/1
1 TABLET, FILM COATED ORAL DAILY PRN
DISCHARGE
Start: 2024-01-19 | End: 2024-02-18

## 2024-01-19 RX ADMIN — SODIUM CHLORIDE, PRESERVATIVE FREE 10 ML: 5 INJECTION INTRAVENOUS at 08:27

## 2024-01-19 RX ADMIN — FUROSEMIDE 40 MG: 40 TABLET ORAL at 08:27

## 2024-01-19 RX ADMIN — HYDROCODONE BITARTRATE AND ACETAMINOPHEN 1 TABLET: 7.5; 325 TABLET ORAL at 02:01

## 2024-01-19 RX ADMIN — HYDROMORPHONE HYDROCHLORIDE 0.5 MG: 1 INJECTION, SOLUTION INTRAMUSCULAR; INTRAVENOUS; SUBCUTANEOUS at 05:05

## 2024-01-19 RX ADMIN — SENNOSIDES 8.6 MG: 8.6 TABLET, COATED ORAL at 08:27

## 2024-01-19 RX ADMIN — HYDROCODONE BITARTRATE AND ACETAMINOPHEN 1 TABLET: 7.5; 325 TABLET ORAL at 13:57

## 2024-01-19 RX ADMIN — DILTIAZEM HYDROCHLORIDE 360 MG: 180 CAPSULE, COATED, EXTENDED RELEASE ORAL at 08:26

## 2024-01-19 RX ADMIN — APIXABAN 5 MG: 5 TABLET, FILM COATED ORAL at 08:27

## 2024-01-19 RX ADMIN — HYDROCODONE BITARTRATE AND ACETAMINOPHEN 1 TABLET: 7.5; 325 TABLET ORAL at 08:26

## 2024-01-19 ASSESSMENT — PAIN DESCRIPTION - DESCRIPTORS
DESCRIPTORS: DISCOMFORT;ACHING
DESCRIPTORS: ACHING;CRAMPING;DISCOMFORT
DESCRIPTORS: DISCOMFORT;ACHING
DESCRIPTORS: ACHING;DISCOMFORT;THROBBING

## 2024-01-19 ASSESSMENT — PAIN DESCRIPTION - ORIENTATION
ORIENTATION: RIGHT

## 2024-01-19 ASSESSMENT — PAIN SCALES - GENERAL
PAINLEVEL_OUTOF10: 8
PAINLEVEL_OUTOF10: 8
PAINLEVEL_OUTOF10: 5
PAINLEVEL_OUTOF10: 9

## 2024-01-19 ASSESSMENT — PAIN DESCRIPTION - ONSET
ONSET: ON-GOING

## 2024-01-19 ASSESSMENT — PAIN DESCRIPTION - PAIN TYPE
TYPE: ACUTE PAIN

## 2024-01-19 ASSESSMENT — PAIN - FUNCTIONAL ASSESSMENT
PAIN_FUNCTIONAL_ASSESSMENT: PREVENTS OR INTERFERES WITH MANY ACTIVE NOT PASSIVE ACTIVITIES
PAIN_FUNCTIONAL_ASSESSMENT: PREVENTS OR INTERFERES SOME ACTIVE ACTIVITIES AND ADLS

## 2024-01-19 ASSESSMENT — PAIN DESCRIPTION - FREQUENCY
FREQUENCY: CONTINUOUS

## 2024-01-19 ASSESSMENT — PAIN DESCRIPTION - LOCATION
LOCATION: SHOULDER

## 2024-01-19 NOTE — PROGRESS NOTES
Physical Therapy  Facility/Department: [address] INTERMEDIATE MED SURG  Physical Therapy Treatment Note    Name: Dina Coulter  : 1945  MRN: [id number]  Date of Service: 2024    Attending Provider:  Geovani Grayson DO    Evaluating PT:  Duane Thomas Jr., P.T.    Room #:  0726/0726-A  Diagnosis:  Inferior pubic ramus fracture, left, closed, initial encounter ([facility]) [S32.592A]  Ambulatory dysfunction [R26.2]  Closed nondisplaced fracture of pelvis, unspecified part of pelvis, initial encounter ([facility]) [S32.9XXA], On 24 pt slipped on the ice outside and fell which caused a fractured her R shoulder.  She was sent home from ER and then next day 24 was walking at home and felt dizzy and thinks she passed out and fell to the floor.  She was taken to ER and found to have a pelvic fracture.    Imaging: x-rays of R shoulder show Displaced comminuted fracture of the right humeral head and neck. CT scan of pelvis show Acute nondisplaced oblique fracture in the mid portion of the inferior pubic ramus on the left side.  Precautions:  WBAT BLE, NWB RUE with sling when OOB, falls, bed/chair alarm.  Equipment Needs:  TBD    SUBJECTIVE:    Pt lives alone in a 1 floor condo with 3 stairs and 1 rail to enter through the garage or can go in through the front door with 1 step to enter.  Pt ambulated with no AD PTA, but has a cane and ww if needed.     OBJECTIVE:   Initial Evaluation  Date: 24 Treatment  2024 Short Term/ Long Term   Goals   Was pt agreeable to Eval/treatment? yes yes    Does pt have pain? Was just given IV Dilaudid and has minimal pain L hip at rest, but pain increased with movement and with WB, R shoulder pain was 6-7/10 L LE pain    Bed Mobility  Rolling: MAX A  Supine to sit: MAX A x2  Sit to supine: MAX A x2  Scooting: MAX A x2 Rolling: Max A  Supine to sit: Mod A  Scooting: Max A seated to EOB Independent    Transfers Sit to stand: MOD A x2  Stand to sit: MOD A x2  Stand pivot: MOD A  with Chilo cane Sit <> stand: Max A  Stand pivot; Mod A bed to chair with use of hemicane L UE SBA   Ambulation   3 feet x 2 reps with hemicane on L side MOD A NT 50 feet with AAD SBA   Stair negotiation: ascended and descended NA  3 steps with 1 rail SBA   AM-PAC 6 Clicks 11/24 10/24      Pt is alert, following instruction  Balance: poor during pivot transfer with chilo cane for support    Pt performed therapeutic exercise of the following: NT    Patient education/treatment  Pt was educated on L UE usage to assist with transfers and to WB through chilo cane during LE advancement    Patient response to education:   Pt verbalized understanding Pt demonstrated skill Pt requires further education in this area   yes With much difficulty yes     ASSESSMENT:   Comments: Nurse ok with Rx. Pt found in bed, assisted to EOB, sat EOB SBA for balance. Pivot very slow and laboring. Pt able to WB B LEs, noted small shuffle steps B LEs during pivot transfer. Pt states much difficulty moving her R LE due to L LE pain, was able to advance L LE without assist. Pt very fatigued after brief activity, unsafe to transfer alone.    Pt was left in a bedside chair with call light in reach, waffle cushion placed    Chair/bed alarm: chair active    Time in 0821   Time out 0845   Total Treatment Time 24 minutes   CPT codes:     Therapeutic activities 35799 24 minutes   Therapeutic exercises 27740 0 minutes       Pt is making consistent progress toward established Physical Therapy goals.  Continue with physical therapy current plan of care.    Ulises Mcarthur PTA   License Number: [id number]

## 2024-01-19 NOTE — DISCHARGE SUMMARY
Internal Medicine Discharge Summary    NAME: Dina Coulter :  1945  MRN:  [id number] PCP:Yvette Foy DO    ADMITTED: 2024   DISCHARGED: 2024    ADMITTING PHYSICIAN: Geovani Grayson DO    PCP: Yvette Foy DO    CONSULTANT(S):   IP CONSULT TO ORTHOPEDIC SURGERY  IP CONSULT TO SOCIAL WORK     ADMITTING DIAGNOSIS:   Inferior pubic ramus fracture, left, closed, initial encounter ([facility]) [S32.592A]  Ambulatory dysfunction [R26.2]  Closed nondisplaced fracture of pelvis, unspecified part of pelvis, initial encounter ([facility]) [S32.9XXA]     Please see H&P for further details    DISCHARGE DIAGNOSES:   Active Hospital Problems    Diagnosis     Inferior pubic ramus fracture, left, closed, initial encounter ([facility]) [S32.592A]     Stage 3a chronic kidney disease (HCC) [N18.31]     Chronic heart failure with preserved ejection fraction (HCC) [I50.32]     History of pulmonary embolism [Z86.711]     GERD (gastroesophageal reflux disease) [K21.9]     Hypertension [I10]     Chronic pain syndrome [G89.4]     Generalized osteoarthritis [M15.9]     Paroxysmal atrial fibrillation (HCC) [I48.0]     History of DVT (deep vein thrombosis) [Z86.718]     Hyperlipidemia [E78.5]        BRIEF HISTORY OF PRESENT ILLNESS: Dina Coulter is a 78 y.o. female patient of Yvette Foy DO who  has a past medical history of Acute renal failure syndrome (HCC), Chest pain, Chronic pain syndrome, Colon polyps, Degenerative joint disease, Diarrhea, GERD (gastroesophageal reflux disease), History of DVT (deep vein thrombosis), History of pulmonary embolism, Hyperlipidemia, Hypertension, Insomnia, Obesity, Paroxysmal atrial fibrillation (HCC), and Syncope and collapse. who originally had concerns including Dizziness (Was seen yesterday after slipping on ice and breaking right shoulder) and Difficulty Walking. at presentation on 2024, and was found to have Inferior pubic ramus fracture, left, closed, initial encounter ([facility])

## 2024-01-19 NOTE — CARE COORDINATION
Дмитрий received for Sarabjit. [facility] ambulance set up for 1 pm. Paperwork on chart. Updated staff, pt and facility. Await final discharge-shanelle

## 2024-01-19 NOTE — PROGRESS NOTES
[facility] Quality Flow/Interdisciplinary Rounds Progress Note        Quality Flow Rounds held on January 19, 2024    Disciplines Attending:  Bedside Nurse, , , and Nursing Unit Leadership    Dina Coulter was admitted on 1/17/2024  4:45 PM    Anticipated Discharge Date:  Expected Discharge Date: 01/19/24    Disposition:    Alphonse Score:  Alphonse Scale Score: 19    Readmission Risk              Risk of Unplanned Readmission:  15           Discussed patient goal for the day, patient clinical progression, and barriers to discharge.  The following Goal(s) of the Day/Commitment(s) have been identified:   discharge planning - Sarabjit Ward RN  January 19, 2024

## 2024-01-19 NOTE — PROGRESS NOTES
Internal Medicine Progress Note    Patient's name: Dina Coulter  : 1945  Chief complaints (on day of admission): Dizziness (Was seen yesterday after slipping on ice and breaking right shoulder) and Difficulty Walking  Admission date: 2024  Date of service: 2024   Room: [address]  Primary care physician: Yvette Foy DO  Reason for visit: Follow-up for pubic rami fracture    Subjective  Dina was seen and examined.  No family present at the bedside.    Currently she is seen sitting up in bed awake and alert in no distress.  She does report having some discomfort which she describes as cramping in her right neck and upper shoulder area.  She reports that she has been using an ice pack which does seem to help a lot.  She denies any shortness of breath or difficulty breathing.  Denies any fever or chills.  She denies any nausea or vomiting.  She reports that she really does not have any pain other than when she tries to get up and move which is very uncomfortable.  She is asking if she will be going to rehab today.  No other issues or concerns from nursing.    Review of Systems  Full 10 point review of systems negative unless mentioned above.    Hospital Medications  Current Facility-Administered Medications   Medication Dose Route Frequency Provider Last Rate Last Admin    HYDROcodone-acetaminophen (NORCO) 7.5-325 MG per tablet 1 tablet  1 tablet Oral Q4H PRN Greg Morataya MD   1 tablet at 24 0201    HYDROmorphone (DILAUDID) injection 0.5 mg  0.5 mg IntraVENous Q3H PRN Greg Morataya MD   0.5 mg at 24 0505    acetaminophen (TYLENOL) tablet 650 mg  650 mg Oral Q6H PRN Greg Moratyaa MD        Or    acetaminophen (TYLENOL) suppository 650 mg  650 mg Rectal Q6H PRN Greg Moratyaa MD        apixaban (ELIQUIS) tablet 5 mg  5 mg Oral BID Tania Cuevas APRN - CNP   5 mg at 24    baclofen (LIORESAL) tablet 10 mg  10 mg Oral Nightly  Hypertension [I10]     Chronic pain syndrome [G89.4]     Generalized osteoarthritis [M15.9]     Paroxysmal atrial fibrillation (HCC) [I48.0]     History of DVT (deep vein thrombosis) [Z86.718]     Hyperlipidemia [E78.5]          Plan  Acute nondisplaced oblique fx inferior pubic ramus  WBAT per ortho  Continue PT/OT  Pain medications as needed  Ortho input appreciated -- no surgery planned      Right proximal humerus fx   Continue sling to the RUE -- let elbow hang free  NWB to the RUE  Ortho input appreciated -- no surgery planned      KELLY on CKD3:   Baseline sCr ~ 1.3  Lasix restarted 1/18  Continue to monitor BMP     Afib:  Eliquis for anticoagulation  Cardizem CD for rate control    Weakness:  PT/OT evaluations reviewed  Will need rehab    PT AM-PAC-- 11/24  Follow labs   DVT prophylaxis  Please see orders for further management and care.  Discharge plan: [facility] once insurance authorization is obtained    The pertinent details of this case were discussed with Dr. Grayson.    Electronically signed by KIMBERLY Orozco - CNP on 1/19/2024 at 8:21 AM

## 2024-01-19 NOTE — PROGRESS NOTES
Occupational Therapy  OT BEDSIDE TREATMENT NOTE      Date:2024  Patient Name: Dina Coulter  MRN: [id number]  : 1945  Room: [address] [place] [address]       Evaluating OT: Jaylin Owentoñasunday OTR/L   [id number]       Referring Provider:Tania Cuevas APRN - CNP     Specific Provider Orders/Date:OT eval and treat 2024       Diagnosis:  Inferior pubic ramus fracture, left, closed, initial encounter ([facility]) [S32.592A]  Ambulatory dysfunction [R26.2]  Closed nondisplaced fracture of pelvis, unspecified part of pelvis, initial encounter ([facility]) [S32.9XXA]  Per ortho note:   Right comminuted prox humerus fx. And left inferior pubic ramus fx.   Closed treatment of right prox humerus fx in sling  Closed treatment of left ramus fx with assistive device  WBAT both legs  NWB right arm  Let elbow hang free  Sling when  out of bed  No plan for surgery at this time   Precautions:  Fall Risk, NWB R UE sling, WBAT B LEs,       Assessment of current deficits    [x] Functional mobility            [x]ADLs           [x] Strength                  []Cognition    [x] Functional transfers          [x] IADLs         [x] Safety Awareness   [x]Endurance    [] Fine Coordination                         [x] Balance      [] Vision/perception   []Sensation      []Gross Motor Coordination             [] ROM           [] Delirium                   [] Motor Control      OT PLAN OF CARE   OT POC based on physician orders, patient diagnosis and results of clinical assessment     Frequency/Duration  2-4 days/wk for 2 weeks PRN   Specific OT Treatment Interventions to include:   ADL retraining/adapted techniques and AE recommendations to increase functional independence within precautions                    Energy conservation techniques to improve tolerance for selfcare routine   Functional transfer/mobility training/DME recommendations for increased independence, safety and fall prevention         Patient/family education to increase safety and functional  Fair, arm pain at times during ADL  Good  with ADL activity      Comments:  patient cleared with nursing and agreeable to session. Patient limited by pain and fatigue but good carry over of instruction and good effort demonstrated during all ADL activity. Patient in chair with call light in reach and alarm on    Education/treatment: ADL and functional transfer/activity performed to increase safety and independence during self care tasks. Education provided on safety awareness, adl reeducation, functional transfer training, sling positioning    Pt has made progress towards set goals.     Time In: 8:44  Time Out: 9:07     Min Units   Therapeutic Ex 02353     Therapeutic Activities 16490     ADL/Self Care 73365 23 2   Orthotic Management 05990     Neuro Re-Ed 80187     Non-Billable Time     TOTAL TIMED TREATMENT 23 2       Andie JORGE/JUDITH [id number]

## 2024-01-19 NOTE — PLAN OF CARE
Problem: Discharge Planning  Goal: Discharge to home or other facility with appropriate resources  1/18/2024 2314 by Carlos A Drake, RN  Outcome: Progressing     Problem: Pain  Goal: Verbalizes/displays adequate comfort level or baseline comfort level  1/18/2024 2314 by Carlos A Drake, RN  Outcome: Progressing     Problem: Skin/Tissue Integrity  Goal: Absence of new skin breakdown  Description: 1.  Monitor for areas of redness and/or skin breakdown  2.  Assess vascular access sites hourly  3.  Every 4-6 hours minimum:  Change oxygen saturation probe site  4.  Every 4-6 hours:  If on nasal continuous positive airway pressure, respiratory therapy assess nares and determine need for appliance change or resting period.  1/18/2024 2314 by Carlos A Drake, RN  Outcome: Progressing     Problem: Safety - Adult  Goal: Free from fall injury  1/18/2024 2314 by Carlos A Drake, RN  Outcome: Progressing     Problem: Chronic Conditions and Co-morbidities  Goal: Patient's chronic conditions and co-morbidity symptoms are monitored and maintained or improved  1/18/2024 2314 by Carlos A Drake, RN  Outcome: Progressing

## 2024-01-20 NOTE — PROGRESS NOTES
Physician Progress Note      PATIENT:               JASON GARRETT  [facility] #:                  [id number]  :                       1945  ADMIT DATE:       2024 4:45 PM  DISCH DATE:        2024 3:19 PM  RESPONDING  PROVIDER #:        Geovani Grayson DO          QUERY TEXT:    Patient admitted with right proximal fracture, noted to have atrial   fibrillation documented in H&P and is maintained on Eliquis. If possible,   please document in progress notes and discharge summary if you are evaluating   and/or treating any of the following:    The medical record reflects the following:  Risk Factors: advanced age, female, HTN, CHF  Clinical Indicators: H&P  \"...Afib: Eliquis...\"  Treatment: Eliquis, Cardizem    Thank you,  Jennifer Kidd RN, BSN, CDIS  Clinical Documentation Integrity  [email or web address]  Options provided:  -- Secondary hypercoagulable state in a patient with atrial fibrillation  -- Other - I will add my own diagnosis  -- Disagree - Not applicable / Not valid  -- Disagree - Clinically unable to determine / Unknown  -- Refer to Clinical Documentation Reviewer    PROVIDER RESPONSE TEXT:    This patient has secondary hypercoagulable state in a patient with atrial   fibrillation.    Query created by: Jennifer Kidd on 2024 9:36 AM      Electronically signed by:  Geovani Grayson DO 2024 9:01 AM

## 2024-02-09 ENCOUNTER — TELEPHONE (OUTPATIENT)
Dept: PRIMARY CARE CLINIC | Age: 79
End: 2024-02-09

## 2024-02-09 NOTE — TELEPHONE ENCOUNTER
Being discharged to home on Monday will you follow for home care for PT/OT?   TRANSFER - IN REPORT:    Verbal report received from Maria Luisa(name) on Hanna Cos  being received from 4w(unit) for routine progression of care      Report consisted of patients Situation, Background, Assessment and   Recommendations(SBAR). Information from the following report(s) SBAR, Kardex and Recent Results was reviewed with the receiving nurse. Opportunity for questions and clarification was provided. Assessment completed upon patients arrival to unit and care assumed.

## 2024-02-13 ENCOUNTER — CARE COORDINATION (OUTPATIENT)
Dept: CASE MANAGEMENT | Age: 79
End: 2024-02-13

## 2024-02-13 NOTE — CARE COORDINATION
Care Transitions Post-Acute Facility Discharge Call    2024    Patient: Dina Coulter Patient : 1945   MRN: [id number]  Reason for Admission: fall and fx pubic ramis and Humerus  Discharge Date: 24 RARS: Readmission Risk Score: 14.8       Acute Care Course:    SEB Ed with fall resulting in fxs   to  [facility] L pubic ramus fx and R humerus fx. WBAT and arm in sling. No sx needed.    to  Sarabjit Hollingsworth to home with [facility].     HFU made:  3/5 Cardio  3/8 PCP    Needs HFU with Corinne Schenker from ortho and with PCP    Sig Hx:   CHF, PAF, DVT, GERD, chronic pain, OA, CKD3    DME:     Conversation:   Left HIPPA compliant message regarding the nature of the call and a request for a return call with my contact information      CARLOS Salcedo, [phone]  [facility] Transition Nurse         Follow up plan:  Will re-attempt           Care Transitions Post Acute Facility Transition    [facility]  Post Acute Admit Date: 24  Post Acute Discharge Date: 24  Do you have all of your prescriptions and are they filled?: Yes            Care Transitions Interventions         Future Appointments   Date Time Provider Department Center   3/5/2024  1:20 PM Ro Cruz MD [facility]   3/28/2024 11:30 AM Yvette Foy DO [facility]       Jayla Maurice RN

## 2024-02-14 ENCOUNTER — CARE COORDINATION (OUTPATIENT)
Dept: CASE MANAGEMENT | Age: 79
End: 2024-02-14

## 2024-02-14 NOTE — CARE COORDINATION
Care Transitions Post-Acute Facility Discharge Call    2024    Patient: Dina Coulter Patient : 1945   MRN: [id number]  Reason for Admission: L pubic ramus, R humerus fx  Discharge Date: 24 RARS: Readmission Risk Score: 14.8    Called and spoke to Dina. She asked that I call back as she has company      SARI SalcedoN, RN   [facility] Transition Nurse  [phone]    Care Transitions Post Acute Facility Transition    Post Acute Facility: [facility]  Post Acute Admit Date: 24  Post Acute Discharge Date: 24  Do you have all of your prescriptions and are they filled?: Yes            Care Transitions Interventions         Future Appointments   Date Time Provider Department Center   3/5/2024  1:20 PM Ro Cruz MD [facility]   3/28/2024 11:30 AM Yvette Foy DO [facility]       Jayla Maurice, RN

## 2024-02-15 ENCOUNTER — CARE COORDINATION (OUTPATIENT)
Dept: CASE MANAGEMENT | Age: 79
End: 2024-02-15

## 2024-02-15 DIAGNOSIS — S32.592A INFERIOR PUBIC RAMUS FRACTURE, LEFT, CLOSED, INITIAL ENCOUNTER (HCC): Primary | ICD-10-CM

## 2024-02-15 PROCEDURE — 1111F DSCHRG MED/CURRENT MED MERGE: CPT | Performed by: FAMILY MEDICINE

## 2024-02-16 NOTE — CARE COORDINATION
Care Transitions Initial Follow Up Call    Call within 2 business days of discharge: Yes    Patient Current Location:  Home: [address] [place] [address] [place] [address]    Care Transition Nurse contacted the patient by telephone to perform post hospital discharge assessment. Verified name and  with patient as identifiers. Provided introduction to self, and explanation of the Care Transition Nurse role.     Patient: Dina Coulter Patient : 1945   MRN: [id number]  Reason for Admission: pelvis fx, fatigue and weakness.  Discharge Date: 24 RARS: Readmission Risk Score: 14.8      Last Discharge Facility       Date Complaint Diagnosis Description Type Department Provider    24 Dizziness; Difficulty Walking Closed nondisplaced fracture of pelvis, unspecified part of pelvis, initial encounter (HCC) ... ED to Hosp-Admission (Discharged) (ADMITTED) [facility] Geovani Grayson, ; Katie Echavarria...            Was this an external facility discharge? Yes,   Discharge Facility: [facility]    Challenges to be reviewed by the provider   Additional needs identified to be addressed with provider: No  none               Method of communication with provider: none.    Acute Care Course:    [facility] Ed with fall resulting in fxs   to  [facility] L pubic ramus fx and R humerus fx. WBAT and arm in sling. No sx needed.    to  [facility].      HFU made:  3/5 Cardio  3/8 PCP          Sig Hx:   CHF, PAF, DVT, GERD, chronic pain, OA, CKD3     DME:      Conversation:   Spoke with Dina after 2 IDs. She states she is doing great and that C has established. She is gets up with a cane then is able to walk by herself. She only has 2 steps to get into the condo. She saw ortho on Monday and is doing well. She is sleeping and eating well. She orders her meds and they are delivered and she orders her food to be delivered. Reviewed medications. Educated on constipation. Educated on importance of

## 2024-03-05 DIAGNOSIS — I48.0 PAROXYSMAL ATRIAL FIBRILLATION (HCC): ICD-10-CM

## 2024-03-05 RX ORDER — FUROSEMIDE 40 MG/1
40 TABLET ORAL DAILY
Qty: 90 TABLET | Refills: 1 | Status: SHIPPED | OUTPATIENT
Start: 2024-03-05

## 2024-03-21 DIAGNOSIS — M25.552 LEFT HIP PAIN: ICD-10-CM

## 2024-03-21 DIAGNOSIS — I48.0 PAROXYSMAL ATRIAL FIBRILLATION (HCC): ICD-10-CM

## 2024-03-21 RX ORDER — BACLOFEN 10 MG/1
10 TABLET ORAL NIGHTLY PRN
Qty: 30 TABLET | Refills: 5 | Status: SHIPPED | OUTPATIENT
Start: 2024-03-21

## 2024-03-21 RX ORDER — DILTIAZEM HYDROCHLORIDE 360 MG/1
CAPSULE, EXTENDED RELEASE ORAL
Qty: 90 CAPSULE | Refills: 1 | Status: SHIPPED | OUTPATIENT
Start: 2024-03-21

## 2024-03-30 ENCOUNTER — OFFICE VISIT (OUTPATIENT)
Dept: PRIMARY CARE CLINIC | Age: 79
End: 2024-03-30
Payer: MEDICARE

## 2024-03-30 VITALS
SYSTOLIC BLOOD PRESSURE: 112 MMHG | DIASTOLIC BLOOD PRESSURE: 64 MMHG | BODY MASS INDEX: 29.25 KG/M2 | TEMPERATURE: 97.3 F | WEIGHT: 182 LBS | OXYGEN SATURATION: 99 % | HEIGHT: 66 IN | HEART RATE: 56 BPM

## 2024-03-30 DIAGNOSIS — Z09 HOSPITAL DISCHARGE FOLLOW-UP: ICD-10-CM

## 2024-03-30 DIAGNOSIS — F51.01 PRIMARY INSOMNIA: ICD-10-CM

## 2024-03-30 DIAGNOSIS — M15.9 GENERALIZED OSTEOARTHRITIS: ICD-10-CM

## 2024-03-30 DIAGNOSIS — S42.221D CLOSED 2-PART DISPLACED FRACTURE OF SURGICAL NECK OF RIGHT HUMERUS WITH ROUTINE HEALING, SUBSEQUENT ENCOUNTER: ICD-10-CM

## 2024-03-30 DIAGNOSIS — S32.592D CLOSED FRACTURE OF LEFT INFERIOR PUBIC RAMUS WITH ROUTINE HEALING, SUBSEQUENT ENCOUNTER: Primary | ICD-10-CM

## 2024-03-30 PROBLEM — S32.592A INFERIOR PUBIC RAMUS FRACTURE, LEFT, CLOSED, INITIAL ENCOUNTER (HCC): Status: RESOLVED | Noted: 2024-01-17 | Resolved: 2024-03-30

## 2024-03-30 PROBLEM — S32.599A INFERIOR PUBIC RAMUS FRACTURE (HCC): Status: RESOLVED | Noted: 2024-01-17 | Resolved: 2024-03-30

## 2024-03-30 PROCEDURE — 1036F TOBACCO NON-USER: CPT | Performed by: FAMILY MEDICINE

## 2024-03-30 PROCEDURE — G8399 PT W/DXA RESULTS DOCUMENT: HCPCS | Performed by: FAMILY MEDICINE

## 2024-03-30 PROCEDURE — 3074F SYST BP LT 130 MM HG: CPT | Performed by: FAMILY MEDICINE

## 2024-03-30 PROCEDURE — G8484 FLU IMMUNIZE NO ADMIN: HCPCS | Performed by: FAMILY MEDICINE

## 2024-03-30 PROCEDURE — 1123F ACP DISCUSS/DSCN MKR DOCD: CPT | Performed by: FAMILY MEDICINE

## 2024-03-30 PROCEDURE — 99214 OFFICE O/P EST MOD 30 MIN: CPT | Performed by: FAMILY MEDICINE

## 2024-03-30 PROCEDURE — 3078F DIAST BP <80 MM HG: CPT | Performed by: FAMILY MEDICINE

## 2024-03-30 PROCEDURE — 1111F DSCHRG MED/CURRENT MED MERGE: CPT | Performed by: FAMILY MEDICINE

## 2024-03-30 PROCEDURE — 1090F PRES/ABSN URINE INCON ASSESS: CPT | Performed by: FAMILY MEDICINE

## 2024-03-30 PROCEDURE — G8417 CALC BMI ABV UP PARAM F/U: HCPCS | Performed by: FAMILY MEDICINE

## 2024-03-30 PROCEDURE — G8427 DOCREV CUR MEDS BY ELIG CLIN: HCPCS | Performed by: FAMILY MEDICINE

## 2024-03-30 RX ORDER — ACETAMINOPHEN AND CODEINE PHOSPHATE 300; 30 MG/1; MG/1
1 TABLET ORAL EVERY 8 HOURS PRN
COMMUNITY
Start: 2024-03-08 | End: 2024-03-30 | Stop reason: SDUPTHER

## 2024-03-30 RX ORDER — TRAZODONE HYDROCHLORIDE 50 MG/1
50 TABLET ORAL NIGHTLY
Qty: 30 TABLET | Refills: 2 | Status: SHIPPED | OUTPATIENT
Start: 2024-03-30

## 2024-03-30 RX ORDER — ACETAMINOPHEN AND CODEINE PHOSPHATE 300; 30 MG/1; MG/1
1 TABLET ORAL EVERY 8 HOURS PRN
Qty: 90 TABLET | Refills: 2 | Status: SHIPPED | OUTPATIENT
Start: 2024-03-30 | End: 2024-06-28

## 2024-03-30 ASSESSMENT — ENCOUNTER SYMPTOMS
VOMITING: 0
CONSTIPATION: 0
ABDOMINAL PAIN: 0
NAUSEA: 0
COUGH: 0
SHORTNESS OF BREATH: 0
WHEEZING: 0
BACK PAIN: 1
DIARRHEA: 0

## 2024-03-30 ASSESSMENT — PATIENT HEALTH QUESTIONNAIRE - PHQ9
SUM OF ALL RESPONSES TO PHQ9 QUESTIONS 1 & 2: 0
SUM OF ALL RESPONSES TO PHQ QUESTIONS 1-9: 0
2. FEELING DOWN, DEPRESSED OR HOPELESS: NOT AT ALL
SUM OF ALL RESPONSES TO PHQ QUESTIONS 1-9: 0
1. LITTLE INTEREST OR PLEASURE IN DOING THINGS: NOT AT ALL

## 2024-03-30 NOTE — PROGRESS NOTES
of March 30, 2024 10:25 AM. If you have any questions, ask your nurse or doctor.                START taking these medications      traZODone 50 MG tablet  Commonly known as: DESYREL  Take 1 tablet by mouth nightly  Started by: Yvette Foy DO            CONTINUE taking these medications      acetaminophen-codeine 300-30 MG per tablet  Commonly known as: TYLENOL #3  Take 1 tablet by mouth every 8 hours as needed for Pain for up to 90 days. Max Daily Amount: 3 tablets     apixaban 5 MG Tabs tablet  Commonly known as: Eliquis  TAKE ONE TABLET BY MOUTH 2 TIMES A DAY     baclofen 10 MG tablet  Commonly known as: LIORESAL  Take 1 tablet by mouth nightly as needed (muscle spasm)     calcium carbonate 500 MG chewable tablet  Commonly known as: TUMS     dilTIAZem 360 MG extended release capsule  Commonly known as: TIAZAC  TAKE ONE CAPSULE BY MOUTH EVERY DAY     furosemide 40 MG tablet  Commonly known as: LASIX  Take 1 tablet by mouth daily               Where to Get Your Medications        These medications were sent to [facility] [place], [address] P [phone] -  [phone]  [address] [place], [place] [address] [address]      Phone: [phone]   acetaminophen-codeine 300-30 MG per tablet  traZODone 50 MG tablet          Medications marked \"taking\" at this time  Outpatient Medications Marked as Taking for the 3/30/24 encounter (Office Visit) with Yvette Foy DO   Medication Sig Dispense Refill    acetaminophen-codeine (TYLENOL #3) 300-30 MG per tablet Take 1 tablet by mouth every 8 hours as needed for Pain for up to 90 days. Max Daily Amount: 3 tablets 90 tablet 2    traZODone (DESYREL) 50 MG tablet Take 1 tablet by mouth nightly 30 tablet 2    dilTIAZem (TIAZAC) 360 MG extended release capsule TAKE ONE CAPSULE BY MOUTH EVERY DAY 90 capsule 1    baclofen (LIORESAL) 10 MG tablet Take 1 tablet by mouth nightly as needed (muscle spasm) 30 tablet 5    apixaban (ELIQUIS) 5 MG TABS

## 2024-04-08 DIAGNOSIS — N18.31 STAGE 3A CHRONIC KIDNEY DISEASE (HCC): ICD-10-CM

## 2024-04-08 DIAGNOSIS — R73.01 IMPAIRED FASTING BLOOD SUGAR: ICD-10-CM

## 2024-04-08 DIAGNOSIS — I10 PRIMARY HYPERTENSION: ICD-10-CM

## 2024-04-08 DIAGNOSIS — E55.9 VITAMIN D INSUFFICIENCY: ICD-10-CM

## 2024-04-08 LAB
ALBUMIN SERPL-MCNC: 4 G/DL (ref 3.5–5.2)
ALP BLD-CCNC: 125 U/L (ref 35–104)
ALT SERPL-CCNC: 7 U/L (ref 0–32)
ANION GAP SERPL CALCULATED.3IONS-SCNC: 14 MMOL/L (ref 7–16)
AST SERPL-CCNC: 17 U/L (ref 0–31)
BASOPHILS ABSOLUTE: 0.08 K/UL (ref 0–0.2)
BASOPHILS RELATIVE PERCENT: 1 % (ref 0–2)
BILIRUB SERPL-MCNC: 0.4 MG/DL (ref 0–1.2)
BILIRUBIN URINE: NEGATIVE
BUN BLDV-MCNC: 21 MG/DL (ref 6–23)
CALCIUM SERPL-MCNC: 9.8 MG/DL (ref 8.6–10.2)
CHLORIDE BLD-SCNC: 104 MMOL/L (ref 98–107)
CHOLESTEROL: 146 MG/DL
CO2: 23 MMOL/L (ref 22–29)
COLOR: YELLOW
COMMENT: NORMAL
CREAT SERPL-MCNC: 1.1 MG/DL (ref 0.5–1)
EOSINOPHILS ABSOLUTE: 0.12 K/UL (ref 0.05–0.5)
EOSINOPHILS RELATIVE PERCENT: 1 % (ref 0–6)
FOLATE: >20 NG/ML (ref 4.8–24.2)
GFR SERPL CREATININE-BSD FRML MDRD: 52 ML/MIN/1.73M2
GLUCOSE BLD-MCNC: 83 MG/DL (ref 74–99)
GLUCOSE URINE: NEGATIVE MG/DL
HBA1C MFR BLD: 5.4 % (ref 4–5.6)
HCT VFR BLD CALC: 45 % (ref 34–48)
HDLC SERPL-MCNC: 43 MG/DL
HEMOGLOBIN: 14.2 G/DL (ref 11.5–15.5)
IMMATURE GRANULOCYTES %: 0 % (ref 0–5)
IMMATURE GRANULOCYTES ABSOLUTE: <0.03 K/UL (ref 0–0.58)
KETONES, URINE: NEGATIVE MG/DL
LDL CHOLESTEROL: 83 MG/DL
LEUKOCYTE ESTERASE, URINE: NEGATIVE
LYMPHOCYTES ABSOLUTE: 1.48 K/UL (ref 1.5–4)
LYMPHOCYTES RELATIVE PERCENT: 17 % (ref 20–42)
MCH RBC QN AUTO: 31.4 PG (ref 26–35)
MCHC RBC AUTO-ENTMCNC: 31.6 G/DL (ref 32–34.5)
MCV RBC AUTO: 99.6 FL (ref 80–99.9)
MONOCYTES ABSOLUTE: 0.6 K/UL (ref 0.1–0.95)
MONOCYTES RELATIVE PERCENT: 7 % (ref 2–12)
NEUTROPHILS ABSOLUTE: 6.61 K/UL (ref 1.8–7.3)
NEUTROPHILS RELATIVE PERCENT: 74 % (ref 43–80)
NITRITE, URINE: NEGATIVE
PDW BLD-RTO: 14 % (ref 11.5–15)
PH UA: 6 (ref 5–9)
PLATELET # BLD: 373 K/UL (ref 130–450)
PMV BLD AUTO: 10.7 FL (ref 7–12)
POTASSIUM SERPL-SCNC: 4.7 MMOL/L (ref 3.5–5)
PROTEIN UA: NEGATIVE MG/DL
RBC # BLD: 4.52 M/UL (ref 3.5–5.5)
SODIUM BLD-SCNC: 141 MMOL/L (ref 132–146)
SPECIFIC GRAVITY UA: 1.02 (ref 1–1.03)
TOTAL PROTEIN: 7.1 G/DL (ref 6.4–8.3)
TRIGL SERPL-MCNC: 102 MG/DL
TSH SERPL DL<=0.05 MIU/L-ACNC: 1.73 UIU/ML (ref 0.27–4.2)
TURBIDITY: CLEAR
URIC ACID: 6.8 MG/DL (ref 2.4–5.7)
URINE HGB: NEGATIVE
UROBILINOGEN, URINE: 0.2 EU/DL (ref 0–1)
VITAMIN B-12: 183 PG/ML (ref 211–946)
VITAMIN D 25-HYDROXY: 27.4 NG/ML (ref 30–100)
VLDLC SERPL CALC-MCNC: 20 MG/DL
WBC # BLD: 8.9 K/UL (ref 4.5–11.5)

## 2024-07-01 ENCOUNTER — OFFICE VISIT (OUTPATIENT)
Dept: PRIMARY CARE CLINIC | Age: 79
End: 2024-07-01
Payer: MEDICARE

## 2024-07-01 VITALS
DIASTOLIC BLOOD PRESSURE: 86 MMHG | HEIGHT: 66 IN | OXYGEN SATURATION: 100 % | SYSTOLIC BLOOD PRESSURE: 138 MMHG | HEART RATE: 82 BPM | BODY MASS INDEX: 29.25 KG/M2 | WEIGHT: 182 LBS | TEMPERATURE: 97.4 F

## 2024-07-01 DIAGNOSIS — F51.01 PRIMARY INSOMNIA: ICD-10-CM

## 2024-07-01 DIAGNOSIS — M79.641 RIGHT HAND PAIN: ICD-10-CM

## 2024-07-01 DIAGNOSIS — I48.0 PAROXYSMAL ATRIAL FIBRILLATION (HCC): ICD-10-CM

## 2024-07-01 DIAGNOSIS — I73.9 PVD (PERIPHERAL VASCULAR DISEASE) (HCC): ICD-10-CM

## 2024-07-01 DIAGNOSIS — G89.4 CHRONIC PAIN SYNDROME: ICD-10-CM

## 2024-07-01 DIAGNOSIS — I50.32 CHRONIC HEART FAILURE WITH PRESERVED EJECTION FRACTION (HCC): ICD-10-CM

## 2024-07-01 DIAGNOSIS — G89.29 CHRONIC PAIN OF RIGHT KNEE: ICD-10-CM

## 2024-07-01 DIAGNOSIS — R73.01 IMPAIRED FASTING GLUCOSE: ICD-10-CM

## 2024-07-01 DIAGNOSIS — M25.561 CHRONIC PAIN OF RIGHT KNEE: ICD-10-CM

## 2024-07-01 DIAGNOSIS — N18.31 STAGE 3A CHRONIC KIDNEY DISEASE (HCC): ICD-10-CM

## 2024-07-01 DIAGNOSIS — I10 PRIMARY HYPERTENSION: Primary | ICD-10-CM

## 2024-07-01 PROCEDURE — 1036F TOBACCO NON-USER: CPT | Performed by: FAMILY MEDICINE

## 2024-07-01 PROCEDURE — 1090F PRES/ABSN URINE INCON ASSESS: CPT | Performed by: FAMILY MEDICINE

## 2024-07-01 PROCEDURE — G8399 PT W/DXA RESULTS DOCUMENT: HCPCS | Performed by: FAMILY MEDICINE

## 2024-07-01 PROCEDURE — G8417 CALC BMI ABV UP PARAM F/U: HCPCS | Performed by: FAMILY MEDICINE

## 2024-07-01 PROCEDURE — G2211 COMPLEX E/M VISIT ADD ON: HCPCS | Performed by: FAMILY MEDICINE

## 2024-07-01 PROCEDURE — 3075F SYST BP GE 130 - 139MM HG: CPT | Performed by: FAMILY MEDICINE

## 2024-07-01 PROCEDURE — 99214 OFFICE O/P EST MOD 30 MIN: CPT | Performed by: FAMILY MEDICINE

## 2024-07-01 PROCEDURE — G8427 DOCREV CUR MEDS BY ELIG CLIN: HCPCS | Performed by: FAMILY MEDICINE

## 2024-07-01 PROCEDURE — 1123F ACP DISCUSS/DSCN MKR DOCD: CPT | Performed by: FAMILY MEDICINE

## 2024-07-01 PROCEDURE — 3079F DIAST BP 80-89 MM HG: CPT | Performed by: FAMILY MEDICINE

## 2024-07-01 RX ORDER — TRAZODONE HYDROCHLORIDE 100 MG/1
100 TABLET ORAL NIGHTLY
Qty: 30 TABLET | Refills: 5 | Status: SHIPPED | OUTPATIENT
Start: 2024-07-01

## 2024-07-01 RX ORDER — FUROSEMIDE 40 MG/1
40 TABLET ORAL DAILY
Qty: 90 TABLET | Refills: 1 | Status: SHIPPED | OUTPATIENT
Start: 2024-07-01

## 2024-07-01 RX ORDER — DILTIAZEM HYDROCHLORIDE 360 MG/1
CAPSULE, EXTENDED RELEASE ORAL
Qty: 90 CAPSULE | Refills: 1 | Status: SHIPPED | OUTPATIENT
Start: 2024-07-01

## 2024-07-01 RX ORDER — ACETAMINOPHEN AND CODEINE PHOSPHATE 300; 30 MG/1; MG/1
1 TABLET ORAL EVERY 8 HOURS PRN
Qty: 90 TABLET | Refills: 2 | Status: SHIPPED | OUTPATIENT
Start: 2024-07-01 | End: 2024-07-31

## 2024-07-01 NOTE — PROGRESS NOTES
24  Dina Coulter : 1945 Sex: female  Age: 78 y.o.    Chief Complaint   Patient presents with    3 Month Follow-Up     Continued shoulder pain, making it difficult to sleep    Discuss Medications     Tylenol #3     HPI:  78 y.o. female presents today for 4 month(s) follow up of chronic medical conditions, medication refills.  Patient's chart, medical, surgical and medication history all reviewed.    Atrial Fibrillation  Patient has atrial fibrillation.  Previously seen by Dr. Huang, now follows with Dr. Cruz  Rate controlled on Metoprolol and Diltiazem. Current rhythm: irregular.   Anticoagulated on Eliquis   Known history of atrial fibrillation: yes  Valvular disease: No  Associated symptoms:  none  Previous cardioversion and/or ablation: no  History of CAD: No  History of sleep apnea: No  History of ETOH abuse/drug abuse: No  History of thyroid disease: No    Hyperlipidemia  The ASCVD Risk score (Lizbeth DK, et al., 2019) failed to calculate for the following reasons:    The 2019 ASCVD risk score is only valid for ages 40 to 79    Knee Pain  Patient presents with knee pain involving bilateral knees. Onset of the symptoms was several years ago.   Inciting event: this is a longstanding problem which has been getting worse.      Current symptoms include crepitus sensation, pain located medially, stiffness and swelling. Pain is aggravated by any weight bearing, going up and down stairs, rising after sitting, squatting and walking.  Patient has had prior knee problems.      Evaluation to date: plain films: OA and Ortho consult: patient sees Dr. Ramirez.  Has been told that she needs to have the L knee replaced, but hasn't gone through with it yet     Treatment to date: avoidance of offending activity, corticosteroid injection which was effective and rest.    Shoulder pain  Patient was seen in the ER after a slip and fall on ice in January.  She was found to have a fracture of her R humerus.  She was

## 2024-07-22 ASSESSMENT — ENCOUNTER SYMPTOMS: COUGH: 0

## 2024-08-19 ENCOUNTER — TELEPHONE (OUTPATIENT)
Dept: PRIMARY CARE CLINIC | Age: 79
End: 2024-08-19

## 2024-08-19 NOTE — TELEPHONE ENCOUNTER
The pt is calling to see if she can get in to see you because for the last 2 days she has been nauseated and throwing up, she says she isn't having any other symptoms

## 2024-08-20 ENCOUNTER — OFFICE VISIT (OUTPATIENT)
Dept: PRIMARY CARE CLINIC | Age: 79
End: 2024-08-20
Payer: MEDICARE

## 2024-08-20 VITALS
SYSTOLIC BLOOD PRESSURE: 122 MMHG | HEIGHT: 66 IN | WEIGHT: 178.8 LBS | BODY MASS INDEX: 28.73 KG/M2 | DIASTOLIC BLOOD PRESSURE: 70 MMHG | OXYGEN SATURATION: 98 % | HEART RATE: 79 BPM | TEMPERATURE: 96.9 F

## 2024-08-20 DIAGNOSIS — R11.2 NAUSEA AND VOMITING, UNSPECIFIED VOMITING TYPE: Primary | ICD-10-CM

## 2024-08-20 PROCEDURE — G8427 DOCREV CUR MEDS BY ELIG CLIN: HCPCS | Performed by: FAMILY MEDICINE

## 2024-08-20 PROCEDURE — 3074F SYST BP LT 130 MM HG: CPT | Performed by: FAMILY MEDICINE

## 2024-08-20 PROCEDURE — 1090F PRES/ABSN URINE INCON ASSESS: CPT | Performed by: FAMILY MEDICINE

## 2024-08-20 PROCEDURE — 99213 OFFICE O/P EST LOW 20 MIN: CPT | Performed by: FAMILY MEDICINE

## 2024-08-20 PROCEDURE — 1123F ACP DISCUSS/DSCN MKR DOCD: CPT | Performed by: FAMILY MEDICINE

## 2024-08-20 PROCEDURE — 1036F TOBACCO NON-USER: CPT | Performed by: FAMILY MEDICINE

## 2024-08-20 PROCEDURE — G8399 PT W/DXA RESULTS DOCUMENT: HCPCS | Performed by: FAMILY MEDICINE

## 2024-08-20 PROCEDURE — 3078F DIAST BP <80 MM HG: CPT | Performed by: FAMILY MEDICINE

## 2024-08-20 PROCEDURE — G8417 CALC BMI ABV UP PARAM F/U: HCPCS | Performed by: FAMILY MEDICINE

## 2024-08-20 RX ORDER — ONDANSETRON 4 MG/1
4 TABLET, FILM COATED ORAL 3 TIMES DAILY PRN
Qty: 30 TABLET | Refills: 0 | Status: SHIPPED | OUTPATIENT
Start: 2024-08-20

## 2024-08-20 RX ORDER — PANTOPRAZOLE SODIUM 40 MG/1
40 TABLET, DELAYED RELEASE ORAL
Qty: 30 TABLET | Refills: 1 | Status: SHIPPED | OUTPATIENT
Start: 2024-08-20

## 2024-08-20 ASSESSMENT — ENCOUNTER SYMPTOMS
VOMITING: 1
NAUSEA: 1
ABDOMINAL PAIN: 0
WHEEZING: 0
BACK PAIN: 1
CONSTIPATION: 0
COUGH: 0
SHORTNESS OF BREATH: 0
DIARRHEA: 0

## 2024-08-20 NOTE — PROGRESS NOTES
24  Dina Coulter : 1945 Sex: female  Age: 78 y.o.    Chief Complaint   Patient presents with    Nausea & Vomiting     Since Saturday      HPI:  78 y.o. female presents for acute visit due to nausea and vomiting.   Symptoms started 4 days ago.  No fever or chills.  Spells are intermittent.  Thinks it may be due to eating large amounts of tomatoes.  No diarrhea or melena.  No abdominal pain associated with it.     ROS:  Review of Systems   Constitutional:  Negative for appetite change, chills, fatigue and fever.   Respiratory:  Negative for cough, shortness of breath and wheezing.    Cardiovascular:  Negative for chest pain, palpitations and leg swelling.   Gastrointestinal:  Positive for nausea and vomiting. Negative for abdominal pain, constipation and diarrhea.   Musculoskeletal:  Positive for arthralgias, back pain, gait problem, neck pain and neck stiffness.   Skin:  Negative for rash.   Neurological:  Negative for dizziness and headaches.   Hematological:  Negative for adenopathy.   Psychiatric/Behavioral:  Negative for dysphoric mood. The patient is not nervous/anxious.    All other systems reviewed and are negative.     Current Outpatient Medications on File Prior to Visit   Medication Sig Dispense Refill    diclofenac sodium (VOLTAREN) 1 % GEL Apply 4 g topically 4 times daily 100 g 2    furosemide (LASIX) 40 MG tablet Take 1 tablet by mouth daily 90 tablet 1    apixaban (ELIQUIS) 5 MG TABS tablet TAKE ONE TABLET BY MOUTH 2 TIMES A  tablet 3    traZODone (DESYREL) 100 MG tablet Take 1 tablet by mouth nightly (Patient taking differently: Take 1 tablet by mouth as needed for Sleep (At night)) 30 tablet 5    dilTIAZem (TIAZAC) 360 MG extended release capsule TAKE ONE CAPSULE BY MOUTH EVERY DAY 90 capsule 1    calcium carbonate (TUMS) 500 MG chewable tablet Take 2 tablets by mouth 2 times daily as needed for Heartburn OTC       No current facility-administered medications on file prior

## 2024-08-27 DIAGNOSIS — G89.4 CHRONIC PAIN SYNDROME: ICD-10-CM

## 2024-08-27 RX ORDER — ACETAMINOPHEN AND CODEINE PHOSPHATE 300; 30 MG/1; MG/1
TABLET ORAL
Qty: 90 TABLET | Refills: 0 | OUTPATIENT
Start: 2024-08-27

## 2024-09-19 ENCOUNTER — OFFICE VISIT (OUTPATIENT)
Dept: CARDIOLOGY CLINIC | Age: 79
End: 2024-09-19
Payer: MEDICARE

## 2024-09-19 VITALS
WEIGHT: 178.5 LBS | RESPIRATION RATE: 16 BRPM | HEIGHT: 63 IN | SYSTOLIC BLOOD PRESSURE: 116 MMHG | BODY MASS INDEX: 31.63 KG/M2 | DIASTOLIC BLOOD PRESSURE: 80 MMHG | HEART RATE: 71 BPM

## 2024-09-19 DIAGNOSIS — I48.0 PAROXYSMAL ATRIAL FIBRILLATION (HCC): Primary | ICD-10-CM

## 2024-09-19 PROCEDURE — G8427 DOCREV CUR MEDS BY ELIG CLIN: HCPCS | Performed by: INTERNAL MEDICINE

## 2024-09-19 PROCEDURE — 3079F DIAST BP 80-89 MM HG: CPT | Performed by: INTERNAL MEDICINE

## 2024-09-19 PROCEDURE — 93000 ELECTROCARDIOGRAM COMPLETE: CPT | Performed by: INTERNAL MEDICINE

## 2024-09-19 PROCEDURE — 1123F ACP DISCUSS/DSCN MKR DOCD: CPT | Performed by: INTERNAL MEDICINE

## 2024-09-19 PROCEDURE — 99214 OFFICE O/P EST MOD 30 MIN: CPT | Performed by: INTERNAL MEDICINE

## 2024-09-19 PROCEDURE — 1090F PRES/ABSN URINE INCON ASSESS: CPT | Performed by: INTERNAL MEDICINE

## 2024-09-19 PROCEDURE — 1036F TOBACCO NON-USER: CPT | Performed by: INTERNAL MEDICINE

## 2024-09-19 PROCEDURE — G8399 PT W/DXA RESULTS DOCUMENT: HCPCS | Performed by: INTERNAL MEDICINE

## 2024-09-19 PROCEDURE — 3074F SYST BP LT 130 MM HG: CPT | Performed by: INTERNAL MEDICINE

## 2024-09-19 PROCEDURE — G8417 CALC BMI ABV UP PARAM F/U: HCPCS | Performed by: INTERNAL MEDICINE

## 2024-09-25 ENCOUNTER — PATIENT MESSAGE (OUTPATIENT)
Dept: PRIMARY CARE CLINIC | Age: 79
End: 2024-09-25

## 2024-09-25 DIAGNOSIS — R73.01 IMPAIRED FASTING GLUCOSE: ICD-10-CM

## 2024-09-25 DIAGNOSIS — M15.9 GENERALIZED OSTEOARTHRITIS: ICD-10-CM

## 2024-09-25 DIAGNOSIS — S32.592D CLOSED FRACTURE OF LEFT INFERIOR PUBIC RAMUS WITH ROUTINE HEALING, SUBSEQUENT ENCOUNTER: ICD-10-CM

## 2024-09-25 DIAGNOSIS — S42.221D CLOSED 2-PART DISPLACED FRACTURE OF SURGICAL NECK OF RIGHT HUMERUS WITH ROUTINE HEALING, SUBSEQUENT ENCOUNTER: ICD-10-CM

## 2024-09-25 DIAGNOSIS — N18.31 STAGE 3A CHRONIC KIDNEY DISEASE (HCC): ICD-10-CM

## 2024-09-25 DIAGNOSIS — I10 PRIMARY HYPERTENSION: ICD-10-CM

## 2024-09-25 LAB
ALBUMIN: 4.4 G/DL (ref 3.5–5.2)
ALP BLD-CCNC: 141 U/L (ref 35–104)
ALT SERPL-CCNC: 10 U/L (ref 0–32)
ANION GAP SERPL CALCULATED.3IONS-SCNC: 15 MMOL/L (ref 7–16)
AST SERPL-CCNC: 19 U/L (ref 0–31)
BASOPHILS ABSOLUTE: 0.08 K/UL (ref 0–0.2)
BASOPHILS RELATIVE PERCENT: 1 % (ref 0–2)
BILIRUB SERPL-MCNC: 0.4 MG/DL (ref 0–1.2)
BILIRUBIN, URINE: NEGATIVE
BUN BLDV-MCNC: 16 MG/DL (ref 6–23)
CALCIUM IONIZED: 1.22 MMOL/L (ref 1.15–1.33)
CALCIUM SERPL-MCNC: 9.5 MG/DL (ref 8.6–10.2)
CHLORIDE BLD-SCNC: 102 MMOL/L (ref 98–107)
CHOLESTEROL, TOTAL: 182 MG/DL
CO2: 25 MMOL/L (ref 22–29)
COLOR, UA: YELLOW
COMMENT: NORMAL
CREAT SERPL-MCNC: 1.1 MG/DL (ref 0.5–1)
EOSINOPHILS ABSOLUTE: 0.1 K/UL (ref 0.05–0.5)
EOSINOPHILS RELATIVE PERCENT: 2 % (ref 0–6)
FOLATE: >20 NG/ML (ref 4.8–24.2)
GFR, ESTIMATED: 49 ML/MIN/1.73M2
GLUCOSE BLD-MCNC: 98 MG/DL (ref 74–99)
GLUCOSE URINE: NEGATIVE MG/DL
HBA1C MFR BLD: 5.6 % (ref 4–5.6)
HCT VFR BLD CALC: 45 % (ref 34–48)
HDLC SERPL-MCNC: 51 MG/DL
HEMOGLOBIN: 14.5 G/DL (ref 11.5–15.5)
IMMATURE GRANULOCYTES %: 0 % (ref 0–5)
IMMATURE GRANULOCYTES ABSOLUTE: <0.03 K/UL (ref 0–0.58)
KETONES, URINE: NEGATIVE MG/DL
LDL CHOLESTEROL: 113 MG/DL
LEUKOCYTE ESTERASE, URINE: NEGATIVE
LYMPHOCYTES ABSOLUTE: 1.31 K/UL (ref 1.5–4)
LYMPHOCYTES RELATIVE PERCENT: 22 % (ref 20–42)
MAGNESIUM: 2.2 MG/DL (ref 1.6–2.6)
MCH RBC QN AUTO: 31.9 PG (ref 26–35)
MCHC RBC AUTO-ENTMCNC: 32.2 G/DL (ref 32–34.5)
MCV RBC AUTO: 98.9 FL (ref 80–99.9)
MONOCYTES ABSOLUTE: 0.53 K/UL (ref 0.1–0.95)
MONOCYTES RELATIVE PERCENT: 9 % (ref 2–12)
NEUTROPHILS ABSOLUTE: 3.88 K/UL (ref 1.8–7.3)
NEUTROPHILS RELATIVE PERCENT: 66 % (ref 43–80)
NITRITE, URINE: NEGATIVE
PDW BLD-RTO: 13.2 % (ref 11.5–15)
PH, URINE: 6 (ref 5–9)
PHOSPHORUS: 3.6 MG/DL (ref 2.5–4.5)
PLATELET # BLD: 328 K/UL (ref 130–450)
PMV BLD AUTO: 11.2 FL (ref 7–12)
POTASSIUM SERPL-SCNC: 3.9 MMOL/L (ref 3.5–5)
PROTEIN UA: NEGATIVE MG/DL
PTH INTACT: 140.9 PG/ML (ref 15–65)
RBC # BLD: 4.55 M/UL (ref 3.5–5.5)
SODIUM BLD-SCNC: 142 MMOL/L (ref 132–146)
SPECIFIC GRAVITY UA: 1.01 (ref 1–1.03)
TOTAL PROTEIN: 7.7 G/DL (ref 6.4–8.3)
TRIGL SERPL-MCNC: 88 MG/DL
TSH SERPL DL<=0.05 MIU/L-ACNC: 3.28 UIU/ML (ref 0.27–4.2)
TURBIDITY: CLEAR
URIC ACID: 5.6 MG/DL (ref 2.4–5.7)
URINE HGB: NEGATIVE
UROBILINOGEN, URINE: 0.2 EU/DL (ref 0–1)
VITAMIN B-12: 241 PG/ML (ref 211–946)
VITAMIN D 25-HYDROXY: 34.5 NG/ML (ref 30–100)
VLDLC SERPL CALC-MCNC: 18 MG/DL
WBC # BLD: 5.9 K/UL (ref 4.5–11.5)

## 2024-09-25 RX ORDER — ACETAMINOPHEN AND CODEINE PHOSPHATE 300; 30 MG/1; MG/1
1 TABLET ORAL EVERY 8 HOURS PRN
Qty: 90 TABLET | Refills: 2 | Status: SHIPPED | OUTPATIENT
Start: 2024-09-25 | End: 2024-12-24

## 2024-09-27 SDOH — HEALTH STABILITY: PHYSICAL HEALTH: ON AVERAGE, HOW MANY MINUTES DO YOU ENGAGE IN EXERCISE AT THIS LEVEL?: 70 MIN

## 2024-09-27 SDOH — HEALTH STABILITY: PHYSICAL HEALTH: ON AVERAGE, HOW MANY DAYS PER WEEK DO YOU ENGAGE IN MODERATE TO STRENUOUS EXERCISE (LIKE A BRISK WALK)?: 6 DAYS

## 2024-09-27 ASSESSMENT — PATIENT HEALTH QUESTIONNAIRE - PHQ9
SUM OF ALL RESPONSES TO PHQ QUESTIONS 1-9: 0
1. LITTLE INTEREST OR PLEASURE IN DOING THINGS: NOT AT ALL
SUM OF ALL RESPONSES TO PHQ QUESTIONS 1-9: 0
SUM OF ALL RESPONSES TO PHQ QUESTIONS 1-9: 0
SUM OF ALL RESPONSES TO PHQ9 QUESTIONS 1 & 2: 0
2. FEELING DOWN, DEPRESSED OR HOPELESS: NOT AT ALL
SUM OF ALL RESPONSES TO PHQ QUESTIONS 1-9: 0

## 2024-09-27 ASSESSMENT — LIFESTYLE VARIABLES
HOW MANY STANDARD DRINKS CONTAINING ALCOHOL DO YOU HAVE ON A TYPICAL DAY: 98
HOW OFTEN DO YOU HAVE A DRINK CONTAINING ALCOHOL: 98
HOW OFTEN DO YOU HAVE SIX OR MORE DRINKS ON ONE OCCASION: 98
HOW MANY STANDARD DRINKS CONTAINING ALCOHOL DO YOU HAVE ON A TYPICAL DAY: PATIENT DECLINED
HOW OFTEN DO YOU HAVE A DRINK CONTAINING ALCOHOL: PATIENT DECLINED

## 2024-10-01 ENCOUNTER — OFFICE VISIT (OUTPATIENT)
Dept: PRIMARY CARE CLINIC | Age: 79
End: 2024-10-01

## 2024-10-01 VITALS
HEIGHT: 63 IN | TEMPERATURE: 97.3 F | SYSTOLIC BLOOD PRESSURE: 116 MMHG | DIASTOLIC BLOOD PRESSURE: 80 MMHG | OXYGEN SATURATION: 97 % | HEART RATE: 79 BPM | BODY MASS INDEX: 31.36 KG/M2 | WEIGHT: 177 LBS

## 2024-10-01 DIAGNOSIS — Z23 NEED FOR INFLUENZA VACCINATION: ICD-10-CM

## 2024-10-01 DIAGNOSIS — Z00.00 MEDICARE ANNUAL WELLNESS VISIT, SUBSEQUENT: Primary | ICD-10-CM

## 2024-10-01 NOTE — PROGRESS NOTES
Medicare Annual Wellness Visit    Dina Coulter is here for Medicare AWV    Assessment & Plan   Medicare annual wellness visit, subsequent  HRA reviewed and addressed.  UTD on HM.  Labs reviewed in detail.  Repeat in 6 months.     Need for influenza vaccination  -     Influenza, FLUAD Trivalent, (age 65 y+), IM, Preservative Free, 0.5mL    Recommendations for Preventive Services Due: see orders and patient instructions/AVS.  Recommended screening schedule for the next 5-10 years is provided to the patient in written form: see Patient Instructions/AVS.     Return in about 3 months (around 1/1/2025), or if symptoms worsen or fail to improve, for Chronic medical conditions.     Subjective   The following acute and/or chronic problems were also addressed today:    Patient doing well overall, just dealing with arthritis pain.     Patient's complete Health Risk Assessment and screening values have been reviewed and are found in Flowsheets. The following problems were reviewed today and where indicated follow up appointments were made and/or referrals ordered.    Positive Risk Factor Screenings with Interventions:    Fall Risk:  Do you feel unsteady or are you worried about falling? : no  2 or more falls in past year?: (!) yes  Fall with injury in past year?: (!) yes     Interventions:    Reviewed medications, home hazards, visual acuity, and co-morbidities that can increase risk for falls         Controlled Medication Review:    Today's Pain Level: No data recorded   Opioid Risk: (Low risk score <55) Opioid risk score: 10    Patient is low risk for opioid use disorder or overdose.    Last PDMP Ezra as Reviewed:  Review User Review Instant Review Result   FLOYDALFREDO 9/25/2024  5:09 PM     Reviewed PDMP [1]            Abnormal BMI (obese):  Body mass index is 31.35 kg/m². (!) Abnormal  Interventions:  See AVS for additional education material      Dentist Screen:  Have you seen the dentist within the past year?:

## 2024-11-19 DIAGNOSIS — I48.0 PAROXYSMAL ATRIAL FIBRILLATION (HCC): ICD-10-CM

## 2024-11-19 RX ORDER — FUROSEMIDE 40 MG/1
40 TABLET ORAL DAILY
Qty: 90 TABLET | Refills: 1 | Status: SHIPPED | OUTPATIENT
Start: 2024-11-19

## 2024-12-16 DIAGNOSIS — S32.592D CLOSED FRACTURE OF LEFT INFERIOR PUBIC RAMUS WITH ROUTINE HEALING, SUBSEQUENT ENCOUNTER: ICD-10-CM

## 2024-12-16 DIAGNOSIS — S42.221D CLOSED 2-PART DISPLACED FRACTURE OF SURGICAL NECK OF RIGHT HUMERUS WITH ROUTINE HEALING, SUBSEQUENT ENCOUNTER: ICD-10-CM

## 2024-12-16 DIAGNOSIS — I48.0 PAROXYSMAL ATRIAL FIBRILLATION (HCC): ICD-10-CM

## 2024-12-16 DIAGNOSIS — F51.01 PRIMARY INSOMNIA: ICD-10-CM

## 2024-12-16 DIAGNOSIS — M15.9 GENERALIZED OSTEOARTHRITIS: ICD-10-CM

## 2024-12-16 RX ORDER — TRAZODONE HYDROCHLORIDE 100 MG/1
100 TABLET ORAL NIGHTLY
Qty: 30 TABLET | Refills: 5 | Status: SHIPPED | OUTPATIENT
Start: 2024-12-16

## 2024-12-16 RX ORDER — ACETAMINOPHEN AND CODEINE PHOSPHATE 300; 30 MG/1; MG/1
1 TABLET ORAL EVERY 8 HOURS PRN
Qty: 90 TABLET | Refills: 2 | Status: SHIPPED | OUTPATIENT
Start: 2024-12-16 | End: 2025-03-16

## 2024-12-16 RX ORDER — DILTIAZEM HYDROCHLORIDE 360 MG/1
CAPSULE, EXTENDED RELEASE ORAL
Qty: 90 CAPSULE | Refills: 1 | Status: SHIPPED | OUTPATIENT
Start: 2024-12-16

## 2025-01-06 ASSESSMENT — PATIENT HEALTH QUESTIONNAIRE - PHQ9
SUM OF ALL RESPONSES TO PHQ9 QUESTIONS 1 & 2: 0
2. FEELING DOWN, DEPRESSED OR HOPELESS: NOT AT ALL
SUM OF ALL RESPONSES TO PHQ QUESTIONS 1-9: 0
2. FEELING DOWN, DEPRESSED OR HOPELESS: NOT AT ALL
SUM OF ALL RESPONSES TO PHQ QUESTIONS 1-9: 0
SUM OF ALL RESPONSES TO PHQ QUESTIONS 1-9: 0
1. LITTLE INTEREST OR PLEASURE IN DOING THINGS: NOT AT ALL
1. LITTLE INTEREST OR PLEASURE IN DOING THINGS: NOT AT ALL
SUM OF ALL RESPONSES TO PHQ9 QUESTIONS 1 & 2: 0
SUM OF ALL RESPONSES TO PHQ QUESTIONS 1-9: 0

## 2025-01-09 ENCOUNTER — OFFICE VISIT (OUTPATIENT)
Dept: PRIMARY CARE CLINIC | Age: 80
End: 2025-01-09

## 2025-01-09 VITALS
WEIGHT: 180 LBS | DIASTOLIC BLOOD PRESSURE: 70 MMHG | HEIGHT: 63 IN | TEMPERATURE: 97.4 F | SYSTOLIC BLOOD PRESSURE: 124 MMHG | OXYGEN SATURATION: 97 % | BODY MASS INDEX: 31.89 KG/M2 | HEART RATE: 102 BPM

## 2025-01-09 DIAGNOSIS — N18.31 STAGE 3A CHRONIC KIDNEY DISEASE (HCC): ICD-10-CM

## 2025-01-09 DIAGNOSIS — D48.9 NEOPLASM OF UNCERTAIN BEHAVIOR: ICD-10-CM

## 2025-01-09 DIAGNOSIS — I50.32 CHRONIC HEART FAILURE WITH PRESERVED EJECTION FRACTION (HCC): ICD-10-CM

## 2025-01-09 DIAGNOSIS — I10 PRIMARY HYPERTENSION: Primary | ICD-10-CM

## 2025-01-09 DIAGNOSIS — M15.9 GENERALIZED OSTEOARTHRITIS: ICD-10-CM

## 2025-01-09 DIAGNOSIS — R73.01 IMPAIRED FASTING GLUCOSE: ICD-10-CM

## 2025-01-09 DIAGNOSIS — E78.2 MIXED HYPERLIPIDEMIA: ICD-10-CM

## 2025-01-09 DIAGNOSIS — M75.01 ADHESIVE CAPSULITIS OF RIGHT SHOULDER: ICD-10-CM

## 2025-01-09 DIAGNOSIS — I48.0 PAROXYSMAL ATRIAL FIBRILLATION (HCC): ICD-10-CM

## 2025-01-09 RX ORDER — ACETAMINOPHEN AND CODEINE PHOSPHATE 300; 30 MG/1; MG/1
1 TABLET ORAL EVERY 8 HOURS PRN
Qty: 90 TABLET | Refills: 2 | Status: SHIPPED | OUTPATIENT
Start: 2025-01-09 | End: 2025-04-09

## 2025-01-09 SDOH — ECONOMIC STABILITY: FOOD INSECURITY: WITHIN THE PAST 12 MONTHS, THE FOOD YOU BOUGHT JUST DIDN'T LAST AND YOU DIDN'T HAVE MONEY TO GET MORE.: NEVER TRUE

## 2025-01-09 SDOH — ECONOMIC STABILITY: FOOD INSECURITY: WITHIN THE PAST 12 MONTHS, YOU WORRIED THAT YOUR FOOD WOULD RUN OUT BEFORE YOU GOT MONEY TO BUY MORE.: NEVER TRUE

## 2025-01-09 ASSESSMENT — ENCOUNTER SYMPTOMS
COUGH: 0
BACK PAIN: 1
NAUSEA: 0
WHEEZING: 0
CONSTIPATION: 0
VOMITING: 0
DIARRHEA: 0
ABDOMINAL PAIN: 0
SHORTNESS OF BREATH: 0

## 2025-01-09 NOTE — ASSESSMENT & PLAN NOTE
OARRS reviewed and appropriate.    Orders:    acetaminophen-codeine (TYLENOL #3) 300-30 MG per tablet; Take 1 tablet by mouth every 8 hours as needed for Pain for up to 90 days. Max Daily Amount: 3 tablets

## 2025-01-09 NOTE — ASSESSMENT & PLAN NOTE
Chronic, at goal (stable), continue current treatment plan    Orders:    CBC with Auto Differential; Future    Comprehensive Metabolic Panel; Future    Lipid Panel; Future    TSH; Future    Urinalysis; Future

## 2025-01-09 NOTE — PROGRESS NOTES
about 3 months (around 4/9/2025), or if symptoms worsen or fail to improve, for Chronic medical conditions.      Seen By:  Yvette Foy, DO

## 2025-01-09 NOTE — ASSESSMENT & PLAN NOTE
Due for repeat labs in 3 months.     Orders:    Uric Acid; Future    PTH, Intact; Future    Phosphorus; Future    Magnesium; Future    Calcium, Ionized; Future    Vitamin D 25 Hydroxy; Future

## 2025-03-10 DIAGNOSIS — I48.0 PAROXYSMAL ATRIAL FIBRILLATION (HCC): ICD-10-CM

## 2025-03-10 RX ORDER — APIXABAN 5 MG/1
5 TABLET, FILM COATED ORAL 2 TIMES DAILY
Qty: 180 TABLET | Refills: 3 | Status: SHIPPED | OUTPATIENT
Start: 2025-03-10

## 2025-03-27 ENCOUNTER — OFFICE VISIT (OUTPATIENT)
Dept: FAMILY MEDICINE CLINIC | Age: 80
End: 2025-03-27
Payer: MEDICARE

## 2025-03-27 VITALS
OXYGEN SATURATION: 99 % | DIASTOLIC BLOOD PRESSURE: 82 MMHG | HEART RATE: 67 BPM | HEIGHT: 63 IN | SYSTOLIC BLOOD PRESSURE: 128 MMHG | BODY MASS INDEX: 31.71 KG/M2 | TEMPERATURE: 97.8 F | WEIGHT: 179 LBS

## 2025-03-27 DIAGNOSIS — R21 RASH AND NONSPECIFIC SKIN ERUPTION: Primary | ICD-10-CM

## 2025-03-27 PROCEDURE — 1036F TOBACCO NON-USER: CPT | Performed by: PHYSICIAN ASSISTANT

## 2025-03-27 PROCEDURE — G8427 DOCREV CUR MEDS BY ELIG CLIN: HCPCS | Performed by: PHYSICIAN ASSISTANT

## 2025-03-27 PROCEDURE — G8399 PT W/DXA RESULTS DOCUMENT: HCPCS | Performed by: PHYSICIAN ASSISTANT

## 2025-03-27 PROCEDURE — 3074F SYST BP LT 130 MM HG: CPT | Performed by: PHYSICIAN ASSISTANT

## 2025-03-27 PROCEDURE — 96372 THER/PROPH/DIAG INJ SC/IM: CPT | Performed by: PHYSICIAN ASSISTANT

## 2025-03-27 PROCEDURE — G8417 CALC BMI ABV UP PARAM F/U: HCPCS | Performed by: PHYSICIAN ASSISTANT

## 2025-03-27 PROCEDURE — 99204 OFFICE O/P NEW MOD 45 MIN: CPT | Performed by: PHYSICIAN ASSISTANT

## 2025-03-27 PROCEDURE — 1123F ACP DISCUSS/DSCN MKR DOCD: CPT | Performed by: PHYSICIAN ASSISTANT

## 2025-03-27 PROCEDURE — 1159F MED LIST DOCD IN RCRD: CPT | Performed by: PHYSICIAN ASSISTANT

## 2025-03-27 PROCEDURE — 1090F PRES/ABSN URINE INCON ASSESS: CPT | Performed by: PHYSICIAN ASSISTANT

## 2025-03-27 PROCEDURE — 3079F DIAST BP 80-89 MM HG: CPT | Performed by: PHYSICIAN ASSISTANT

## 2025-03-27 RX ORDER — PREDNISONE 10 MG/1
TABLET ORAL
Qty: 18 TABLET | Refills: 0 | Status: SHIPPED | OUTPATIENT
Start: 2025-03-27

## 2025-03-27 RX ORDER — METHYLPREDNISOLONE ACETATE 40 MG/ML
40 INJECTION, SUSPENSION INTRA-ARTICULAR; INTRALESIONAL; INTRAMUSCULAR; SOFT TISSUE ONCE
Status: COMPLETED | OUTPATIENT
Start: 2025-03-27 | End: 2025-03-27

## 2025-03-27 RX ADMIN — METHYLPREDNISOLONE ACETATE 40 MG: 40 INJECTION, SUSPENSION INTRA-ARTICULAR; INTRALESIONAL; INTRAMUSCULAR; SOFT TISSUE at 11:47

## 2025-03-27 NOTE — PROGRESS NOTES
3/27/25  Dina Coulter : 1945 Sex: female  Age 79 y.o.      Subjective:  Chief Complaint   Patient presents with    Rash     All over         HPI:     History of Present Illness  The patient is an 79-year-old female who presents for evaluation of a rash.    She reports the onset of a generalized rash approximately one week ago, which is associated with significant pruritus. The rash is predominantly located on the anterior aspect of her body. No recent changes in the use of soaps, detergents, or lotions are reported. However, she recalls assisting a friend with moving and unpacking items wrapped in paper, after which a minor rash was noticed. Two days later, a more extensive rash developed following outdoor activities involving the removal of leaves from flowers, during which there may have been exposure to poison ivy. Self-treatment attempts include Benadryl spray, Benadryl gel, cortisone cream, and alcohol. She mentions a recent change in her toothpaste brand. An appointment is scheduled in two weeks.        ROS:   Unless otherwise stated in this report the patient's positive and negative responses for review of systems for constitutional, eyes, ENT, cardiovascular, respiratory, gastrointestinal, neurological, , musculoskeletal, and integument systems and related systems to the presenting problem are either stated in the history of present illness or were not pertinent or were negative for the symptoms and/or complaints related to the presenting medical problem.  Positives and pertinent negatives as per HPI.  All others reviewed and are negative.      PMH:     Past Medical History:   Diagnosis Date    Acute renal failure syndrome 10/1/16  10/07/2016    2ry severe dehydration     Chest pain 01/15/2020    R/O ACS- normal stress    Chronic pain syndrome     Colon polyps     Microscopic colitis    Degenerative joint disease     Involving multiple joints    Diarrhea     2ry C-Diff 10/1/2016    GERD

## 2025-04-09 DIAGNOSIS — R73.01 IMPAIRED FASTING GLUCOSE: ICD-10-CM

## 2025-04-09 DIAGNOSIS — N18.31 STAGE 3A CHRONIC KIDNEY DISEASE (HCC): ICD-10-CM

## 2025-04-09 DIAGNOSIS — I10 PRIMARY HYPERTENSION: ICD-10-CM

## 2025-04-09 DIAGNOSIS — E78.2 MIXED HYPERLIPIDEMIA: ICD-10-CM

## 2025-04-09 LAB
ALBUMIN: 4 G/DL (ref 3.5–5.2)
ALP BLD-CCNC: 148 U/L (ref 35–104)
ALT SERPL-CCNC: 20 U/L (ref 0–32)
ANION GAP SERPL CALCULATED.3IONS-SCNC: 16 MMOL/L (ref 7–16)
AST SERPL-CCNC: 19 U/L (ref 0–31)
BASOPHILS ABSOLUTE: 0.09 K/UL (ref 0–0.2)
BASOPHILS RELATIVE PERCENT: 1 % (ref 0–2)
BILIRUB SERPL-MCNC: 0.3 MG/DL (ref 0–1.2)
BILIRUBIN, URINE: NEGATIVE
BUN BLDV-MCNC: 22 MG/DL (ref 6–23)
CALCIUM IONIZED: 1.25 MMOL/L (ref 1.15–1.33)
CALCIUM SERPL-MCNC: 9.6 MG/DL (ref 8.6–10.2)
CHLORIDE BLD-SCNC: 105 MMOL/L (ref 98–107)
CHOLESTEROL, TOTAL: 194 MG/DL
CO2: 25 MMOL/L (ref 22–29)
COLOR, UA: YELLOW
COMMENT: NORMAL
CREAT SERPL-MCNC: 1.2 MG/DL (ref 0.5–1)
EOSINOPHILS ABSOLUTE: 0.23 K/UL (ref 0.05–0.5)
EOSINOPHILS RELATIVE PERCENT: 3 % (ref 0–6)
GFR, ESTIMATED: 44 ML/MIN/1.73M2
GLUCOSE BLD-MCNC: 96 MG/DL (ref 74–99)
GLUCOSE URINE: NEGATIVE MG/DL
HBA1C MFR BLD: 5.9 % (ref 4–5.6)
HCT VFR BLD CALC: 43.7 % (ref 34–48)
HDLC SERPL-MCNC: 53 MG/DL
HEMOGLOBIN: 13.7 G/DL (ref 11.5–15.5)
IMMATURE GRANULOCYTES %: 0 % (ref 0–5)
IMMATURE GRANULOCYTES ABSOLUTE: 0.04 K/UL (ref 0–0.58)
KETONES, URINE: NEGATIVE MG/DL
LDL CHOLESTEROL: 118 MG/DL
LEUKOCYTE ESTERASE, URINE: NEGATIVE
LYMPHOCYTES ABSOLUTE: 1.54 K/UL (ref 1.5–4)
LYMPHOCYTES RELATIVE PERCENT: 17 % (ref 20–42)
MAGNESIUM: 2.2 MG/DL (ref 1.6–2.6)
MCH RBC QN AUTO: 32.5 PG (ref 26–35)
MCHC RBC AUTO-ENTMCNC: 31.4 G/DL (ref 32–34.5)
MCV RBC AUTO: 103.8 FL (ref 80–99.9)
MONOCYTES ABSOLUTE: 0.97 K/UL (ref 0.1–0.95)
MONOCYTES RELATIVE PERCENT: 11 % (ref 2–12)
NEUTROPHILS ABSOLUTE: 6.23 K/UL (ref 1.8–7.3)
NEUTROPHILS RELATIVE PERCENT: 69 % (ref 43–80)
NITRITE, URINE: NEGATIVE
PDW BLD-RTO: 13.1 % (ref 11.5–15)
PH, URINE: 5.5 (ref 5–8)
PHOSPHORUS: 3.6 MG/DL (ref 2.5–4.5)
PLATELET # BLD: 295 K/UL (ref 130–450)
PMV BLD AUTO: 11 FL (ref 7–12)
POTASSIUM SERPL-SCNC: 3.9 MMOL/L (ref 3.5–5)
PROTEIN UA: NEGATIVE MG/DL
PTH INTACT: 170.7 PG/ML (ref 15–65)
RBC # BLD: 4.21 M/UL (ref 3.5–5.5)
SODIUM BLD-SCNC: 146 MMOL/L (ref 132–146)
SPECIFIC GRAVITY UA: 1.01 (ref 1–1.03)
TOTAL PROTEIN: 7.2 G/DL (ref 6.4–8.3)
TRIGL SERPL-MCNC: 116 MG/DL
TSH SERPL DL<=0.05 MIU/L-ACNC: 2.45 UIU/ML (ref 0.27–4.2)
TURBIDITY: CLEAR
URIC ACID: 5.8 MG/DL (ref 2.4–5.7)
URINE HGB: NEGATIVE
UROBILINOGEN, URINE: 0.2 EU/DL (ref 0–1)
VITAMIN D 25-HYDROXY: 19.3 NG/ML (ref 30–100)
VLDLC SERPL CALC-MCNC: 23 MG/DL
WBC # BLD: 9.1 K/UL (ref 4.5–11.5)

## 2025-04-10 ENCOUNTER — OFFICE VISIT (OUTPATIENT)
Dept: PRIMARY CARE CLINIC | Age: 80
End: 2025-04-10
Payer: MEDICARE

## 2025-04-10 VITALS
HEIGHT: 63 IN | HEART RATE: 83 BPM | TEMPERATURE: 97.5 F | DIASTOLIC BLOOD PRESSURE: 74 MMHG | OXYGEN SATURATION: 96 % | BODY MASS INDEX: 31.71 KG/M2 | SYSTOLIC BLOOD PRESSURE: 126 MMHG | WEIGHT: 179 LBS

## 2025-04-10 DIAGNOSIS — R21 RASH AND NONSPECIFIC SKIN ERUPTION: ICD-10-CM

## 2025-04-10 DIAGNOSIS — M15.9 GENERALIZED OSTEOARTHRITIS: ICD-10-CM

## 2025-04-10 DIAGNOSIS — I50.32 CHRONIC HEART FAILURE WITH PRESERVED EJECTION FRACTION (HCC): ICD-10-CM

## 2025-04-10 DIAGNOSIS — F51.01 PRIMARY INSOMNIA: ICD-10-CM

## 2025-04-10 DIAGNOSIS — N18.31 STAGE 3A CHRONIC KIDNEY DISEASE (HCC): ICD-10-CM

## 2025-04-10 DIAGNOSIS — I10 PRIMARY HYPERTENSION: Primary | ICD-10-CM

## 2025-04-10 DIAGNOSIS — I48.0 PAROXYSMAL ATRIAL FIBRILLATION (HCC): ICD-10-CM

## 2025-04-10 DIAGNOSIS — R73.01 IMPAIRED FASTING GLUCOSE: ICD-10-CM

## 2025-04-10 PROCEDURE — 1090F PRES/ABSN URINE INCON ASSESS: CPT | Performed by: FAMILY MEDICINE

## 2025-04-10 PROCEDURE — 1036F TOBACCO NON-USER: CPT | Performed by: FAMILY MEDICINE

## 2025-04-10 PROCEDURE — 99214 OFFICE O/P EST MOD 30 MIN: CPT | Performed by: FAMILY MEDICINE

## 2025-04-10 PROCEDURE — G2211 COMPLEX E/M VISIT ADD ON: HCPCS | Performed by: FAMILY MEDICINE

## 2025-04-10 PROCEDURE — G8399 PT W/DXA RESULTS DOCUMENT: HCPCS | Performed by: FAMILY MEDICINE

## 2025-04-10 PROCEDURE — 1160F RVW MEDS BY RX/DR IN RCRD: CPT | Performed by: FAMILY MEDICINE

## 2025-04-10 PROCEDURE — G8417 CALC BMI ABV UP PARAM F/U: HCPCS | Performed by: FAMILY MEDICINE

## 2025-04-10 PROCEDURE — 1159F MED LIST DOCD IN RCRD: CPT | Performed by: FAMILY MEDICINE

## 2025-04-10 PROCEDURE — 3078F DIAST BP <80 MM HG: CPT | Performed by: FAMILY MEDICINE

## 2025-04-10 PROCEDURE — 3074F SYST BP LT 130 MM HG: CPT | Performed by: FAMILY MEDICINE

## 2025-04-10 PROCEDURE — G8427 DOCREV CUR MEDS BY ELIG CLIN: HCPCS | Performed by: FAMILY MEDICINE

## 2025-04-10 PROCEDURE — 1123F ACP DISCUSS/DSCN MKR DOCD: CPT | Performed by: FAMILY MEDICINE

## 2025-04-10 RX ORDER — ACETAMINOPHEN AND CODEINE PHOSPHATE 300; 30 MG/1; MG/1
1 TABLET ORAL EVERY 8 HOURS PRN
Qty: 90 TABLET | Refills: 2 | Status: SHIPPED | OUTPATIENT
Start: 2025-04-10 | End: 2025-07-09

## 2025-04-10 RX ORDER — DILTIAZEM HYDROCHLORIDE 360 MG/1
CAPSULE, EXTENDED RELEASE ORAL
Qty: 90 CAPSULE | Refills: 1 | Status: CANCELLED | OUTPATIENT
Start: 2025-04-10

## 2025-04-10 RX ORDER — TRAZODONE HYDROCHLORIDE 100 MG/1
100 TABLET ORAL NIGHTLY
Qty: 30 TABLET | Refills: 5 | Status: CANCELLED | OUTPATIENT
Start: 2025-04-10

## 2025-04-10 RX ORDER — FUROSEMIDE 40 MG/1
40 TABLET ORAL DAILY
Qty: 90 TABLET | Refills: 1 | Status: SHIPPED | OUTPATIENT
Start: 2025-04-10

## 2025-04-10 RX ORDER — TRAZODONE HYDROCHLORIDE 100 MG/1
100 TABLET ORAL NIGHTLY
Qty: 90 TABLET | Refills: 1 | Status: SHIPPED | OUTPATIENT
Start: 2025-04-10

## 2025-04-10 RX ORDER — DILTIAZEM HYDROCHLORIDE 360 MG/1
CAPSULE, EXTENDED RELEASE ORAL
Qty: 90 CAPSULE | Refills: 1 | Status: SHIPPED | OUTPATIENT
Start: 2025-04-10

## 2025-04-10 RX ORDER — TRIAMCINOLONE ACETONIDE 5 MG/G
CREAM TOPICAL
Qty: 15 G | Refills: 1 | Status: SHIPPED | OUTPATIENT
Start: 2025-04-10

## 2025-04-10 RX ORDER — FUROSEMIDE 40 MG/1
40 TABLET ORAL DAILY
Qty: 90 TABLET | Refills: 1 | Status: CANCELLED | OUTPATIENT
Start: 2025-04-10

## 2025-04-10 ASSESSMENT — ENCOUNTER SYMPTOMS
WHEEZING: 0
SHORTNESS OF BREATH: 0
COUGH: 0
ABDOMINAL PAIN: 0
DIARRHEA: 0
CONSTIPATION: 0
VOMITING: 0
NAUSEA: 0
BACK PAIN: 1

## 2025-04-10 NOTE — ASSESSMENT & PLAN NOTE
Stable.  Will continue to monitor labs.  No electrolyte abnormalities.  Start D3    Orders:    vitamin D-3 125 MCG (5000 UT) TABS tablet; Take 1 tablet by mouth daily    Uric Acid; Future    PTH, Intact; Future    Phosphorus; Future    Magnesium; Future    Calcium, Ionized; Future    Comprehensive Metabolic Panel; Future    Vitamin D 25 Hydroxy; Future

## 2025-04-10 NOTE — PROGRESS NOTES
osteoarthritis    [place] reviewed and is appropriate    Orders:    acetaminophen-codeine (TYLENOL #3) 300-30 MG per tablet; Take 1 tablet by mouth every 8 hours as needed for Pain for up to 90 days. Max Daily Amount: 3 tablets    Rash and nonspecific skin eruption       Orders:    triamcinolone (ARISTOCORT) 0.5 % cream; Apply topically 3 times daily.    Impaired fasting glucose       Orders:    Hemoglobin A1C; Future         Return in about 3 months (around 7/10/2025), or if symptoms worsen or fail to improve, for Chronic pain medication refills.      Seen By:  Yvette Foy DO

## 2025-04-10 NOTE — ASSESSMENT & PLAN NOTE
Chronic, at goal (stable), continue current treatment plan    Orders:    apixaban (ELIQUIS) 5 MG TABS tablet; Take 1 tablet by mouth 2 times daily    dilTIAZem (TIAZAC) 360 MG extended release capsule; TAKE ONE CAPSULE BY MOUTH EVERY DAY

## 2025-04-10 NOTE — ASSESSMENT & PLAN NOTE
OAR[place] reviewed and is appropriate    Orders:    acetaminophen-codeine (TYLENOL #3) 300-30 MG per tablet; Take 1 tablet by mouth every 8 hours as needed for Pain for up to 90 days. Max Daily Amount: 3 tablets

## 2025-04-10 NOTE — ASSESSMENT & PLAN NOTE
Monitored by specialist- no acute findings meriting change in the plan    Orders:    furosemide (LASIX) 40 MG tablet; Take 1 tablet by mouth daily

## 2025-05-12 NOTE — PATIENT INSTRUCTIONS
visit is recommended every 6 months. Try to get at least 150 minutes of exercise per week or 10,000 steps per day on a pedometer . Order or download the FREE \"Exercise & Physical Activity: Your Everyday Guide\" from [employer]. Call 6-658.701.9139 or search [employer] online. You need 4862-2824 mg of calcium and 1575-7957 IU of vitamin D per day. It is possible to meet your calcium requirement with diet alone, but a vitamin D supplement is usually necessary to meet this goal.  When exposed to the sun, use a sunscreen that protects against both UVA and UVB radiation with an SPF of 30 or greater. Reapply every 2 to 3 hours or after sweating, drying off with a towel, or swimming. Always wear a seat belt when traveling in a car. Always wear a helmet when riding a bicycle or motorcycle.
none

## 2025-05-31 ENCOUNTER — HOSPITAL ENCOUNTER (EMERGENCY)
Age: 80
Discharge: HOME | End: 2025-05-31
Attending: STUDENT IN AN ORGANIZED HEALTH CARE EDUCATION/TRAINING PROGRAM
Payer: MEDICARE

## 2025-05-31 ENCOUNTER — APPOINTMENT (OUTPATIENT)
Dept: CT IMAGING | Age: 80
End: 2025-05-31
Payer: MEDICARE

## 2025-05-31 ENCOUNTER — OFFICE VISIT (OUTPATIENT)
Dept: FAMILY MEDICINE CLINIC | Age: 80
End: 2025-05-31

## 2025-05-31 VITALS
TEMPERATURE: 98 F | HEIGHT: 66 IN | DIASTOLIC BLOOD PRESSURE: 67 MMHG | BODY MASS INDEX: 28.12 KG/M2 | SYSTOLIC BLOOD PRESSURE: 120 MMHG | OXYGEN SATURATION: 99 % | RESPIRATION RATE: 17 BRPM | HEART RATE: 67 BPM | WEIGHT: 175 LBS

## 2025-05-31 VITALS
OXYGEN SATURATION: 98 % | DIASTOLIC BLOOD PRESSURE: 64 MMHG | SYSTOLIC BLOOD PRESSURE: 116 MMHG | HEIGHT: 63 IN | HEART RATE: 93 BPM | BODY MASS INDEX: 31.01 KG/M2 | TEMPERATURE: 97 F | WEIGHT: 175 LBS

## 2025-05-31 DIAGNOSIS — R11.2 NAUSEA AND VOMITING, UNSPECIFIED VOMITING TYPE: ICD-10-CM

## 2025-05-31 DIAGNOSIS — S16.1XXA STRAIN OF NECK MUSCLE, INITIAL ENCOUNTER: Primary | ICD-10-CM

## 2025-05-31 DIAGNOSIS — R42 DIZZINESS: ICD-10-CM

## 2025-05-31 DIAGNOSIS — S09.90XA CLOSED HEAD INJURY, INITIAL ENCOUNTER: Primary | ICD-10-CM

## 2025-05-31 DIAGNOSIS — Z79.01 CURRENT USE OF LONG TERM ANTICOAGULATION: ICD-10-CM

## 2025-05-31 DIAGNOSIS — R42 LIGHTHEADED: ICD-10-CM

## 2025-05-31 LAB
ALBUMIN SERPL-MCNC: 4.1 G/DL (ref 3.5–5.2)
ALP SERPL-CCNC: 169 U/L (ref 35–104)
ALT SERPL-CCNC: 12 U/L (ref 0–32)
ANION GAP SERPL CALCULATED.3IONS-SCNC: 21 MMOL/L (ref 7–16)
AST SERPL-CCNC: 18 U/L (ref 0–31)
BASOPHILS # BLD: 0.05 K/UL (ref 0–0.2)
BASOPHILS NFR BLD: 1 % (ref 0–2)
BILIRUB SERPL-MCNC: 0.6 MG/DL (ref 0–1.2)
BUN SERPL-MCNC: 31 MG/DL (ref 6–23)
CALCIUM SERPL-MCNC: 9.5 MG/DL (ref 8.6–10.2)
CHLORIDE SERPL-SCNC: 103 MMOL/L (ref 98–107)
CO2 SERPL-SCNC: 22 MMOL/L (ref 22–29)
CREAT SERPL-MCNC: 1.5 MG/DL (ref 0.5–1)
EKG ATRIAL RATE: 102 BPM
EKG Q-T INTERVAL: 380 MS
EKG QRS DURATION: 68 MS
EKG QTC CALCULATION (BAZETT): 443 MS
EKG R AXIS: 23 DEGREES
EKG T AXIS: 5 DEGREES
EKG VENTRICULAR RATE: 82 BPM
EOSINOPHIL # BLD: 0.05 K/UL (ref 0.05–0.5)
EOSINOPHILS RELATIVE PERCENT: 1 % (ref 0–6)
ERYTHROCYTE [DISTWIDTH] IN BLOOD BY AUTOMATED COUNT: 13.1 % (ref 11.5–15)
GFR, ESTIMATED: 35 ML/MIN/1.73M2
GLUCOSE SERPL-MCNC: 124 MG/DL (ref 74–99)
HCT VFR BLD AUTO: 42.6 % (ref 34–48)
HGB BLD-MCNC: 14.5 G/DL (ref 11.5–15.5)
IMM GRANULOCYTES # BLD AUTO: 0.03 K/UL (ref 0–0.58)
IMM GRANULOCYTES NFR BLD: 0 % (ref 0–5)
LYMPHOCYTES NFR BLD: 1.23 K/UL (ref 1.5–4)
LYMPHOCYTES RELATIVE PERCENT: 12 % (ref 20–42)
MCH RBC QN AUTO: 32.4 PG (ref 26–35)
MCHC RBC AUTO-ENTMCNC: 34 G/DL (ref 32–34.5)
MCV RBC AUTO: 95.3 FL (ref 80–99.9)
MONOCYTES NFR BLD: 0.69 K/UL (ref 0.1–0.95)
MONOCYTES NFR BLD: 7 % (ref 2–12)
NEUTROPHILS NFR BLD: 80 % (ref 43–80)
NEUTS SEG NFR BLD: 8.21 K/UL (ref 1.8–7.3)
PLATELET # BLD AUTO: 350 K/UL (ref 130–450)
PMV BLD AUTO: 10.7 FL (ref 7–12)
POTASSIUM SERPL-SCNC: 4.1 MMOL/L (ref 3.5–5)
PROT SERPL-MCNC: 7.3 G/DL (ref 6.4–8.3)
RBC # BLD AUTO: 4.47 M/UL (ref 3.5–5.5)
SODIUM SERPL-SCNC: 146 MMOL/L (ref 132–146)
TROPONIN I SERPL HS-MCNC: 16 NG/L (ref 0–14)
TROPONIN I SERPL HS-MCNC: 20 NG/L (ref 0–14)
WBC OTHER # BLD: 10.3 K/UL (ref 4.5–11.5)

## 2025-05-31 PROCEDURE — 96374 THER/PROPH/DIAG INJ IV PUSH: CPT

## 2025-05-31 PROCEDURE — 6360000002 HC RX W HCPCS

## 2025-05-31 PROCEDURE — 70498 CT ANGIOGRAPHY NECK: CPT

## 2025-05-31 PROCEDURE — 99285 EMERGENCY DEPT VISIT HI MDM: CPT

## 2025-05-31 PROCEDURE — 93005 ELECTROCARDIOGRAM TRACING: CPT

## 2025-05-31 PROCEDURE — 80053 COMPREHEN METABOLIC PANEL: CPT

## 2025-05-31 PROCEDURE — 2580000003 HC RX 258: Performed by: STUDENT IN AN ORGANIZED HEALTH CARE EDUCATION/TRAINING PROGRAM

## 2025-05-31 PROCEDURE — 85025 COMPLETE CBC W/AUTO DIFF WBC: CPT

## 2025-05-31 PROCEDURE — 70496 CT ANGIOGRAPHY HEAD: CPT

## 2025-05-31 PROCEDURE — 6360000004 HC RX CONTRAST MEDICATION: Performed by: RADIOLOGY

## 2025-05-31 PROCEDURE — 84484 ASSAY OF TROPONIN QUANT: CPT

## 2025-05-31 RX ORDER — IOPAMIDOL 755 MG/ML
75 INJECTION, SOLUTION INTRAVASCULAR
Status: COMPLETED | OUTPATIENT
Start: 2025-05-31 | End: 2025-05-31

## 2025-05-31 RX ORDER — ONDANSETRON 2 MG/ML
4 INJECTION INTRAMUSCULAR; INTRAVENOUS ONCE
Status: COMPLETED | OUTPATIENT
Start: 2025-05-31 | End: 2025-05-31

## 2025-05-31 RX ORDER — 0.9 % SODIUM CHLORIDE 0.9 %
1000 INTRAVENOUS SOLUTION INTRAVENOUS ONCE
Status: COMPLETED | OUTPATIENT
Start: 2025-05-31 | End: 2025-05-31

## 2025-05-31 RX ADMIN — ONDANSETRON 4 MG: 2 INJECTION, SOLUTION INTRAMUSCULAR; INTRAVENOUS at 12:15

## 2025-05-31 RX ADMIN — IOPAMIDOL 75 ML: 755 INJECTION, SOLUTION INTRAVENOUS at 14:17

## 2025-05-31 RX ADMIN — SODIUM CHLORIDE 1000 ML: 9 INJECTION, SOLUTION INTRAVENOUS at 14:30

## 2025-05-31 ASSESSMENT — PAIN SCALES - GENERAL: PAINLEVEL_OUTOF10: 8

## 2025-05-31 ASSESSMENT — PAIN - FUNCTIONAL ASSESSMENT: PAIN_FUNCTIONAL_ASSESSMENT: 0-10

## 2025-05-31 ASSESSMENT — PAIN DESCRIPTION - LOCATION: LOCATION: NECK

## 2025-05-31 NOTE — ED NOTES
Radiology Procedure Waiver   Name: Dina Coulter  : 1945  MRN: [id number]    Date:  25    Time: 2:03 PM EDT    Benefits of immediately proceeding with Radiology exam(s) without pre-testing outweigh the risks or are not indicated as specified below and therefore the following is/are being waived:    [] Pregnancy test   [] Patients LMP on-time and regular.   [] Patient had Tubal Ligation or has other Contraception Device.   [] Patient  is Menopausal or Premenarcheal.    [] Patient had Full or Partial Hysterectomy.    [] Protocol for Iodine allergy    [] MRI Questionnaire     [x] BUN/Creatinine   [] Patient age w/no hx of renal dysfunction.   [] Patient on Dialysis.   [x] Benefit outweigh risk; better labs one month prior; will hydrate with IVF    Electronically signed by Joseph Kaur DO on 25 at 2:03 PM EDT               Joseph Kaur DO  [phone]
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
## 2025-05-31 NOTE — DISCHARGE INSTRUCTIONS
CTA NECK W CONTRAST   Final Result   1. No acute intracranial abnormality.   2.  No significant stenosis or evidence for large vessel occlusion.         CTA HEAD W CONTRAST   Final Result   1. No acute intracranial abnormality.   2.  No significant stenosis or evidence for large vessel occlusion.           Push fluids  Follow up with your family physician  If symptoms worsen or concern arises return for re-evaluation.

## 2025-05-31 NOTE — DISCHARGE INSTR - COC
Continuity of Care Form    Patient Name: Dina Lipscomb   :  1945  MRN:  [id number]    Admit date:  2025  Discharge date:  ***    Code Status Order: Prior   Advance Directives:     Admitting Physician:  No admitting provider for patient encounter.  PCP: Yvette Foy DO    Discharging Nurse: ***  Discharging Hospital Unit/Room#: DISPO/D01  Discharging Unit Phone Number: ***    Emergency Contact:   Extended Emergency Contact Information  Primary Emergency Contact: Anna Ellis  Address: [address]  [facility]  Home Phone: [phone]  Mobile Phone: [phone]  Relation: Brother/Sister  Secondary Emergency Contact: josiane lipscomb  Address: [address]           [place] [address] [facility]  Home Phone: [phone]  Mobile Phone: [phone]  Relation: Niece/Nephew   needed? No    Past Surgical History:  Past Surgical History:   Procedure Laterality Date    APPENDECTOMY      CARDIOVASCULAR STRESS TEST  2020    Normal    CHOLECYSTECTOMY      COLONOSCOPY  10/07/2016    EYE SURGERY   (catraric)    JOINT REPLACEMENT  2012    TOTAL KNEE ARTHROPLASTY Right 2012       Immunization History:   Immunization History   Administered Date(s) Administered    COVID-19, PFIZER GRAY top, DO NOT Dilute, (age 12 y+), IM, 30 mcg/0.3 mL 2022    COVID-19, PFIZER PURPLE top, DILUTE for use, (age 12 y+), 30mcg/0.3mL 2021, 2021, 2021    COVID-19, PFIZER, , (age 12y+), IM, 30mcg/0.3mL 2024    Influenza Vaccine, unspecified formulation 10/01/2010, 2011, 10/08/2012, 10/08/2013, 10/07/2015, 2017    Influenza Virus Vaccine 10/01/2010, 2011, 10/08/2012, 10/08/2013, 10/07/2015, 2017    Influenza, FLUAD, (age 65 y+), IM, Quadv, 0.5mL 2020, 10/13/2022, 2023    Influenza, FLUAD, (age 65 y+), IM, Trivalent PF, 0.5mL 2017, 2018, 2019, 10/01/2024
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
## 2025-06-02 LAB
EKG ATRIAL RATE: 102 BPM
EKG Q-T INTERVAL: 380 MS
EKG QRS DURATION: 68 MS
EKG QTC CALCULATION (BAZETT): 443 MS
EKG R AXIS: 23 DEGREES
EKG T AXIS: 5 DEGREES
EKG VENTRICULAR RATE: 82 BPM

## 2025-06-02 PROCEDURE — 93010 ELECTROCARDIOGRAM REPORT: CPT | Performed by: INTERNAL MEDICINE

## 2025-07-10 ENCOUNTER — OFFICE VISIT (OUTPATIENT)
Dept: PRIMARY CARE CLINIC | Age: 80
End: 2025-07-10

## 2025-07-10 VITALS
DIASTOLIC BLOOD PRESSURE: 70 MMHG | WEIGHT: 181 LBS | SYSTOLIC BLOOD PRESSURE: 110 MMHG | HEART RATE: 64 BPM | OXYGEN SATURATION: 97 % | HEIGHT: 66 IN | TEMPERATURE: 97.8 F | BODY MASS INDEX: 29.09 KG/M2

## 2025-07-10 DIAGNOSIS — I48.0 PAROXYSMAL ATRIAL FIBRILLATION (HCC): ICD-10-CM

## 2025-07-10 DIAGNOSIS — I50.32 CHRONIC HEART FAILURE WITH PRESERVED EJECTION FRACTION (HCC): ICD-10-CM

## 2025-07-10 DIAGNOSIS — Z87.81 HISTORY OF PELVIC FRACTURE: ICD-10-CM

## 2025-07-10 DIAGNOSIS — M15.9 GENERALIZED OSTEOARTHRITIS: ICD-10-CM

## 2025-07-10 DIAGNOSIS — M75.01 ADHESIVE CAPSULITIS OF RIGHT SHOULDER: Primary | ICD-10-CM

## 2025-07-10 DIAGNOSIS — F51.01 PRIMARY INSOMNIA: ICD-10-CM

## 2025-07-10 RX ORDER — FUROSEMIDE 40 MG/1
40 TABLET ORAL DAILY
Qty: 90 TABLET | Refills: 1 | Status: SHIPPED | OUTPATIENT
Start: 2025-07-10

## 2025-07-10 RX ORDER — DILTIAZEM HYDROCHLORIDE 360 MG/1
CAPSULE, EXTENDED RELEASE ORAL
Qty: 90 CAPSULE | Refills: 1 | Status: SHIPPED | OUTPATIENT
Start: 2025-07-10

## 2025-07-10 RX ORDER — ACETAMINOPHEN AND CODEINE PHOSPHATE 300; 30 MG/1; MG/1
1 TABLET ORAL EVERY 8 HOURS PRN
Qty: 90 TABLET | Refills: 2 | Status: SHIPPED | OUTPATIENT
Start: 2025-07-10 | End: 2025-10-08

## 2025-07-10 RX ORDER — TRAZODONE HYDROCHLORIDE 100 MG/1
100 TABLET ORAL NIGHTLY
Qty: 90 TABLET | Refills: 1 | Status: SHIPPED | OUTPATIENT
Start: 2025-07-10

## 2025-07-10 ASSESSMENT — ENCOUNTER SYMPTOMS
GASTROINTESTINAL NEGATIVE: 1
RESPIRATORY NEGATIVE: 1
EYES NEGATIVE: 1
ALLERGIC/IMMUNOLOGIC NEGATIVE: 1

## 2025-07-10 NOTE — PROGRESS NOTES
7/10/25     Dina Coulter    : 1945 Sex: female   Age: 79 y.o.      Chief Complaint   Patient presents with    Hypertension    Medication Refill       Prior to Admission medications    Medication Sig Start Date End Date Taking? Authorizing Provider   acetaminophen-codeine (TYLENOL #3) 300-30 MG per tablet Take 1 tablet by mouth every 8 hours as needed for Pain for up to 90 days. Max Daily Amount: 3 tablets 7/10/25 10/8/25 Yes Jayson Roman DO   apixaban (ELIQUIS) 5 MG TABS tablet Take 1 tablet by mouth 2 times daily 7/10/25  Yes Jayson Roman DO   furosemide (LASIX) 40 MG tablet Take 1 tablet by mouth daily 7/10/25  Yes Jayson Roman DO   traZODone (DESYREL) 100 MG tablet Take 1 tablet by mouth nightly 7/10/25  Yes Jayson Roman DO   dilTIAZem (TIAZAC) 360 MG extended release capsule TAKE ONE CAPSULE BY MOUTH EVERY DAY 7/10/25  Yes Jayson Roman DO   triamcinolone (ARISTOCORT) 0.5 % cream Apply topically 3 times daily. 4/10/25  Yes Yvette Foy DO   vitamin D-3 125 MCG (5000 UT) TABS tablet Take 1 tablet by mouth daily 4/10/25  Yes Yvette Foy DO   diclofenac sodium (VOLTAREN) 1 % GEL Apply 4 g topically 4 times daily 24  Yes Yvette Foy DO   calcium carbonate (TUMS) 500 MG chewable tablet Take 2 tablets by mouth 2 times daily as needed for Heartburn OTC   Yes Provider, MD Can          HPI: Patient evaluated today generalized arthritis paroxysmal A-fib chronic heart failure with preserved ejection fraction insomnia adhesive capsulitis involving the right shoulder after shoulder fracture and history of pelvic fracture.  She remains on multiple medications and well-controlled at this time.  Pain management has been stable.  Cardiac status remains stable.  Medications to continue as prescribed.          Review of Systems   Constitutional: Negative.    HENT: Negative.     Eyes: Negative.    Respiratory: Negative.     Gastrointestinal: Negative.